# Patient Record
Sex: MALE | Race: WHITE | NOT HISPANIC OR LATINO | URBAN - METROPOLITAN AREA
[De-identification: names, ages, dates, MRNs, and addresses within clinical notes are randomized per-mention and may not be internally consistent; named-entity substitution may affect disease eponyms.]

---

## 2017-04-13 ENCOUNTER — OUTPATIENT (OUTPATIENT)
Dept: OUTPATIENT SERVICES | Facility: HOSPITAL | Age: 77
LOS: 1 days | Discharge: HOME | End: 2017-04-13
Payer: MEDICARE

## 2017-04-13 VITALS
HEART RATE: 68 BPM | TEMPERATURE: 97 F | WEIGHT: 250 LBS | HEIGHT: 70.5 IN | SYSTOLIC BLOOD PRESSURE: 124 MMHG | DIASTOLIC BLOOD PRESSURE: 86 MMHG | RESPIRATION RATE: 20 BRPM | OXYGEN SATURATION: 98 %

## 2017-04-13 DIAGNOSIS — K21.9 GASTRO-ESOPHAGEAL REFLUX DISEASE WITHOUT ESOPHAGITIS: ICD-10-CM

## 2017-04-13 DIAGNOSIS — Z96.653 PRESENCE OF ARTIFICIAL KNEE JOINT, BILATERAL: Chronic | ICD-10-CM

## 2017-04-13 DIAGNOSIS — Z98.61 CORONARY ANGIOPLASTY STATUS: Chronic | ICD-10-CM

## 2017-04-13 DIAGNOSIS — Z90.89 ACQUIRED ABSENCE OF OTHER ORGANS: Chronic | ICD-10-CM

## 2017-04-13 DIAGNOSIS — Z96.643 PRESENCE OF ARTIFICIAL HIP JOINT, BILATERAL: Chronic | ICD-10-CM

## 2017-04-13 LAB
ANION GAP SERPL CALC-SCNC: 8 MMOL/L — SIGNIFICANT CHANGE UP (ref 5–17)
APTT BLD: 33.2 SEC — SIGNIFICANT CHANGE UP (ref 27.5–37.4)
BASOPHILS # BLD AUTO: 0.1 K/UL — SIGNIFICANT CHANGE UP (ref 0–0.2)
BASOPHILS NFR BLD AUTO: 1.4 % — SIGNIFICANT CHANGE UP (ref 0–2)
BUN SERPL-MCNC: 16 MG/DL — SIGNIFICANT CHANGE UP (ref 7–23)
CALCIUM SERPL-MCNC: 9.8 MG/DL — SIGNIFICANT CHANGE UP (ref 8.5–10.1)
CHLORIDE SERPL-SCNC: 106 MMOL/L — SIGNIFICANT CHANGE UP (ref 96–108)
CO2 SERPL-SCNC: 28 MMOL/L — SIGNIFICANT CHANGE UP (ref 22–31)
CREAT SERPL-MCNC: 0.85 MG/DL — SIGNIFICANT CHANGE UP (ref 0.5–1.3)
EOSINOPHIL # BLD AUTO: 1.2 K/UL — HIGH (ref 0–0.5)
EOSINOPHIL NFR BLD AUTO: 11.2 % — HIGH (ref 0–6)
GLUCOSE SERPL-MCNC: 95 MG/DL — SIGNIFICANT CHANGE UP (ref 70–99)
HCT VFR BLD CALC: 50.1 % — HIGH (ref 39–50)
HGB BLD-MCNC: 16.6 G/DL — SIGNIFICANT CHANGE UP (ref 13–17)
INR BLD: 1.17 RATIO — HIGH (ref 0.88–1.16)
LYMPHOCYTES # BLD AUTO: 1.3 K/UL — SIGNIFICANT CHANGE UP (ref 1–3.3)
LYMPHOCYTES # BLD AUTO: 13 % — SIGNIFICANT CHANGE UP (ref 13–44)
MCHC RBC-ENTMCNC: 30.7 PG — SIGNIFICANT CHANGE UP (ref 27–34)
MCHC RBC-ENTMCNC: 33.2 GM/DL — SIGNIFICANT CHANGE UP (ref 32–36)
MCV RBC AUTO: 92.4 FL — SIGNIFICANT CHANGE UP (ref 80–100)
MONOCYTES # BLD AUTO: 0.7 K/UL — SIGNIFICANT CHANGE UP (ref 0–0.9)
MONOCYTES NFR BLD AUTO: 6.4 % — SIGNIFICANT CHANGE UP (ref 2–14)
NEUTROPHILS # BLD AUTO: 7 K/UL — SIGNIFICANT CHANGE UP (ref 1.8–7.4)
NEUTROPHILS NFR BLD AUTO: 68 % — SIGNIFICANT CHANGE UP (ref 43–77)
PLATELET # BLD AUTO: 272 K/UL — SIGNIFICANT CHANGE UP (ref 150–400)
POTASSIUM SERPL-MCNC: 4 MMOL/L — SIGNIFICANT CHANGE UP (ref 3.5–5.3)
POTASSIUM SERPL-SCNC: 4 MMOL/L — SIGNIFICANT CHANGE UP (ref 3.5–5.3)
PROTHROM AB SERPL-ACNC: 12.7 SEC — SIGNIFICANT CHANGE UP (ref 9.8–12.7)
RBC # BLD: 5.42 M/UL — SIGNIFICANT CHANGE UP (ref 4.2–5.8)
RBC # FLD: 12.5 % — SIGNIFICANT CHANGE UP (ref 10.3–14.5)
SODIUM SERPL-SCNC: 142 MMOL/L — SIGNIFICANT CHANGE UP (ref 135–145)
WBC # BLD: 10.3 K/UL — SIGNIFICANT CHANGE UP (ref 3.8–10.5)
WBC # FLD AUTO: 10.3 K/UL — SIGNIFICANT CHANGE UP (ref 3.8–10.5)

## 2017-04-13 PROCEDURE — 93010 ELECTROCARDIOGRAM REPORT: CPT

## 2017-04-13 NOTE — H&P PST ADULT - PSH
H/O bilateral hip replacements  right 1999,  2003 left  revision right THR - 2010  H/o total knee replacement, bilateral  right 1997, left 1999  History of PTCA 1  2013 x 1 stent  S/P tonsillectomy  13 y/o

## 2017-04-13 NOTE — H&P PST ADULT - PMH
AAA (abdominal aortic aneurysm)    CAD (coronary artery disease)    GERD (gastroesophageal reflux disease)    HTN (hypertension)    Hypercholesterolemia    Obesity    Osteoarthritis  hip  Stented coronary artery  PTCA x 1 stent -2013

## 2017-04-13 NOTE — H&P PST ADULT - NSANTHOSAYNRD_GEN_A_CORE
No. ALEXANDRO screening performed.  STOP BANG Legend: 0-2 = LOW Risk; 3-4 = INTERMEDIATE Risk; 5-8 = HIGH Risk

## 2017-04-13 NOTE — H&P PST ADULT - ASSESSMENT
76 y/o male with history of GERD and AAA. Scheduled for EGD with Dr. Sheffield.    Plan  1. Stop all NSAIDS, herbal supplements and vitamins for 7 days.  2. NPO at midnight.  3. Take the following medication (Carvedilol) with small sips of water on the morning of your procedure/surgery.  4. Instructed to follow preop instructions on plavix from Dr. Sheffield.

## 2017-04-19 ENCOUNTER — RESULT REVIEW (OUTPATIENT)
Age: 77
End: 2017-04-19

## 2017-04-20 ENCOUNTER — OUTPATIENT (OUTPATIENT)
Dept: OUTPATIENT SERVICES | Facility: HOSPITAL | Age: 77
LOS: 1 days | Discharge: HOME | End: 2017-04-20
Payer: MEDICARE

## 2017-04-20 VITALS
TEMPERATURE: 97 F | HEART RATE: 67 BPM | HEIGHT: 70 IN | DIASTOLIC BLOOD PRESSURE: 80 MMHG | SYSTOLIC BLOOD PRESSURE: 140 MMHG | WEIGHT: 250 LBS | OXYGEN SATURATION: 97 % | RESPIRATION RATE: 16 BRPM

## 2017-04-20 DIAGNOSIS — Z98.61 CORONARY ANGIOPLASTY STATUS: Chronic | ICD-10-CM

## 2017-04-20 DIAGNOSIS — Z96.643 PRESENCE OF ARTIFICIAL HIP JOINT, BILATERAL: Chronic | ICD-10-CM

## 2017-04-20 DIAGNOSIS — Z90.89 ACQUIRED ABSENCE OF OTHER ORGANS: Chronic | ICD-10-CM

## 2017-04-20 DIAGNOSIS — Z96.653 PRESENCE OF ARTIFICIAL KNEE JOINT, BILATERAL: Chronic | ICD-10-CM

## 2017-04-20 PROCEDURE — 88305 TISSUE EXAM BY PATHOLOGIST: CPT | Mod: 26

## 2017-04-20 PROCEDURE — 88313 SPECIAL STAINS GROUP 2: CPT | Mod: 26

## 2017-04-20 RX ORDER — SODIUM CHLORIDE 9 MG/ML
1000 INJECTION INTRAMUSCULAR; INTRAVENOUS; SUBCUTANEOUS
Qty: 0 | Refills: 0 | Status: DISCONTINUED | OUTPATIENT
Start: 2017-04-20 | End: 2017-05-05

## 2017-04-24 LAB — SURGICAL PATHOLOGY FINAL REPORT - CH: SIGNIFICANT CHANGE UP

## 2017-04-26 DIAGNOSIS — K44.9 DIAPHRAGMATIC HERNIA WITHOUT OBSTRUCTION OR GANGRENE: ICD-10-CM

## 2017-04-26 DIAGNOSIS — K22.70 BARRETT'S ESOPHAGUS WITHOUT DYSPLASIA: ICD-10-CM

## 2017-04-26 DIAGNOSIS — K21.0 GASTRO-ESOPHAGEAL REFLUX DISEASE WITH ESOPHAGITIS: ICD-10-CM

## 2017-04-26 DIAGNOSIS — Z79.02 LONG TERM (CURRENT) USE OF ANTITHROMBOTICS/ANTIPLATELETS: ICD-10-CM

## 2017-04-26 DIAGNOSIS — Z91.040 LATEX ALLERGY STATUS: ICD-10-CM

## 2017-04-26 DIAGNOSIS — Z96.651 PRESENCE OF RIGHT ARTIFICIAL KNEE JOINT: ICD-10-CM

## 2017-04-26 DIAGNOSIS — Z87.891 PERSONAL HISTORY OF NICOTINE DEPENDENCE: ICD-10-CM

## 2017-04-26 DIAGNOSIS — I10 ESSENTIAL (PRIMARY) HYPERTENSION: ICD-10-CM

## 2017-04-26 DIAGNOSIS — Z88.5 ALLERGY STATUS TO NARCOTIC AGENT: ICD-10-CM

## 2017-04-26 DIAGNOSIS — Z95.5 PRESENCE OF CORONARY ANGIOPLASTY IMPLANT AND GRAFT: ICD-10-CM

## 2017-04-26 DIAGNOSIS — I25.10 ATHEROSCLEROTIC HEART DISEASE OF NATIVE CORONARY ARTERY WITHOUT ANGINA PECTORIS: ICD-10-CM

## 2017-04-26 DIAGNOSIS — Z86.718 PERSONAL HISTORY OF OTHER VENOUS THROMBOSIS AND EMBOLISM: ICD-10-CM

## 2017-04-26 DIAGNOSIS — Z96.643 PRESENCE OF ARTIFICIAL HIP JOINT, BILATERAL: ICD-10-CM

## 2018-06-04 ENCOUNTER — TRANSCRIPTION ENCOUNTER (OUTPATIENT)
Age: 78
End: 2018-06-04

## 2018-06-04 NOTE — ASU PATIENT PROFILE, ADULT - VISION (WITH CORRECTIVE LENSES IF THE PATIENT USUALLY WEARS THEM):
Partially impaired: cannot see medication labels or newsprint, but can see obstacles in path, and the surrounding layout; can count fingers at arm's length/left eye blurry

## 2018-06-04 NOTE — ASU PATIENT PROFILE, ADULT - PSH
H/O bilateral hip replacements  right 1999,  2003 left  revision right THR - 2010  H/o total knee replacement, bilateral  right 1997, left 1999  History of PTCA 1  2013 x 1 stent  S/P tonsillectomy  11 y/o

## 2018-06-05 ENCOUNTER — OUTPATIENT (OUTPATIENT)
Dept: OUTPATIENT SERVICES | Facility: HOSPITAL | Age: 78
LOS: 1 days | End: 2018-06-05
Payer: MEDICARE

## 2018-06-05 VITALS
HEART RATE: 63 BPM | OXYGEN SATURATION: 98 % | DIASTOLIC BLOOD PRESSURE: 70 MMHG | SYSTOLIC BLOOD PRESSURE: 125 MMHG | RESPIRATION RATE: 16 BRPM

## 2018-06-05 VITALS
HEART RATE: 64 BPM | DIASTOLIC BLOOD PRESSURE: 72 MMHG | WEIGHT: 260.59 LBS | RESPIRATION RATE: 20 BRPM | SYSTOLIC BLOOD PRESSURE: 140 MMHG | HEIGHT: 69 IN | TEMPERATURE: 98 F | OXYGEN SATURATION: 98 %

## 2018-06-05 DIAGNOSIS — H35.342 MACULAR CYST, HOLE, OR PSEUDOHOLE, LEFT EYE: ICD-10-CM

## 2018-06-05 DIAGNOSIS — Z98.61 CORONARY ANGIOPLASTY STATUS: Chronic | ICD-10-CM

## 2018-06-05 DIAGNOSIS — Z90.89 ACQUIRED ABSENCE OF OTHER ORGANS: Chronic | ICD-10-CM

## 2018-06-05 DIAGNOSIS — Z96.653 PRESENCE OF ARTIFICIAL KNEE JOINT, BILATERAL: Chronic | ICD-10-CM

## 2018-06-05 DIAGNOSIS — Z96.643 PRESENCE OF ARTIFICIAL HIP JOINT, BILATERAL: Chronic | ICD-10-CM

## 2018-06-05 PROCEDURE — C1889: CPT

## 2018-06-05 PROCEDURE — 67042 VIT FOR MACULAR HOLE: CPT | Mod: LT

## 2018-06-05 NOTE — ASU DISCHARGE PLAN (ADULT/PEDIATRIC). - NOTIFY
Bleeding that does not stop/Fever greater than 101/Pain not relieved by Medications/Persistent Nausea and Vomiting/Inability to Tolerate Liquids or Foods Bleeding that does not stop/Swelling that continues/Fever greater than 101/Persistent Nausea and Vomiting/Pain not relieved by Medications

## 2018-06-19 ENCOUNTER — TRANSCRIPTION ENCOUNTER (OUTPATIENT)
Age: 78
End: 2018-06-19

## 2018-08-23 NOTE — ASU PREOP CHECKLIST - NS PREOP CHK MONITOR ANESTHESIA CONSENT
Copied To:  Faye Call MD

Attending MD:  Faye Call MD



PROCEDURE DATE:  08/22/2018



PREOPERATIVE DIAGNOSIS:  Cholelithiasis.



POSTOPERATIVE DIAGNOSIS:  Cholelithiasis.



PROCEDURE:  Laparoscopic cholecystectomy.



SURGEON:  Faye Call MD.



ASSISTANT:  Pelon Smith DO.



ANESTHESIA:  General.



ANESTHESIA ADMINISTERED BY:  Dr. Solitario



DESCRIPTION OF OPERATION:  With the patient in the supine position under

adequate general anesthesia, the abdomen was prepped and draped in the

usual sterile manner.  Veress needle puncture was performed at the

umbilicus with insufflation to 15 cm water pressure of CO2 and a 10 mm

laparoscopic trocar was inserted via an infraumbilical incision.  Under

direct vision, additional trocars were inserted in the epigastrium and

right costal margin.  The gallbladder was visualized.  It was not acutely

inflamed although there were adhesions to the peritoneal surface of the

gallbladder consistent with recent inflammation.  The gallbladder fundus

was grasped and elevated.  A number of lobulated stones were noted within

the gallbladder including in the area of the infundibulum.  The

infundibular stones were elevated out of the infundibulum, and the

infundibulum was grasped and retracted laterally.  The infundibulum was

noted to be a elongated and tortuous, and the cystic duct was carefully

dissected and identified freeing up areas _____ itself and the gallbladder

was then cleared circumferential.  The cystic duct was then followed

proximally into the gallbladder and distally down towards the common bile

duct.  The cystic artery was also identified, and the anterior branch of

the cystic artery was triply clipped and divided to allow full

identification of the cystic duct.  A cystic duct was then completely

dissected and triply clipped and divided, and the gallbladder was dissected

free of the liver bed using electrocautery.  The posterior branch of the

cystic artery was also identified within the peritoneal fold and this also

was triply clipped and divided.  The liver bed was inspected for hemostasis

and the dissection was completed.  The gallbladder was placed in a specimen

retrieval bag and removed via the umbilical port site.  The

pneumoperitoneum was released and the trocars removed.  The umbilical port

site was closed with a figure-of-eight fascial suture of 0 Vicryl.  All

incisions were closed with 4-0 Monocryl subcuticular sutures and

Steri-Strips.  Dry sterile dressings were applied.  The patient tolerated

the procedure well and transferred to the recovery room in stable

condition.  Estimated blood loss for the procedure was 10 mL.





__________________________________________

Faye aCll MD



DD:  08/22/2018 17:11:35

DT:  08/22/2018 22:24:22

Job # [id number]
done

## 2019-04-08 PROBLEM — M19.90 UNSPECIFIED OSTEOARTHRITIS, UNSPECIFIED SITE: Chronic | Status: ACTIVE | Noted: 2017-04-13

## 2019-04-08 PROBLEM — K21.9 GASTRO-ESOPHAGEAL REFLUX DISEASE WITHOUT ESOPHAGITIS: Chronic | Status: ACTIVE | Noted: 2017-04-13

## 2019-04-08 PROBLEM — I10 ESSENTIAL (PRIMARY) HYPERTENSION: Chronic | Status: ACTIVE | Noted: 2017-04-13

## 2019-04-08 PROBLEM — E78.00 PURE HYPERCHOLESTEROLEMIA, UNSPECIFIED: Chronic | Status: ACTIVE | Noted: 2017-04-13

## 2019-04-08 PROBLEM — E66.9 OBESITY, UNSPECIFIED: Chronic | Status: ACTIVE | Noted: 2017-04-13

## 2019-04-08 PROBLEM — Z95.5 PRESENCE OF CORONARY ANGIOPLASTY IMPLANT AND GRAFT: Chronic | Status: ACTIVE | Noted: 2017-04-13

## 2019-04-08 PROBLEM — I25.10 ATHEROSCLEROTIC HEART DISEASE OF NATIVE CORONARY ARTERY WITHOUT ANGINA PECTORIS: Chronic | Status: ACTIVE | Noted: 2017-04-13

## 2019-04-16 ENCOUNTER — RECORD ABSTRACTING (OUTPATIENT)
Age: 79
End: 2019-04-16

## 2019-04-16 DIAGNOSIS — Z78.9 OTHER SPECIFIED HEALTH STATUS: ICD-10-CM

## 2019-04-16 DIAGNOSIS — Z87.891 PERSONAL HISTORY OF NICOTINE DEPENDENCE: ICD-10-CM

## 2019-04-16 DIAGNOSIS — Z98.890 OTHER SPECIFIED POSTPROCEDURAL STATES: ICD-10-CM

## 2019-04-16 DIAGNOSIS — Z63.4 DISAPPEARANCE AND DEATH OF FAMILY MEMBER: ICD-10-CM

## 2019-04-16 DIAGNOSIS — K63.5 POLYP OF COLON: ICD-10-CM

## 2019-04-16 DIAGNOSIS — K57.90 DIVERTICULOSIS OF INTESTINE, PART UNSPECIFIED, W/OUT PERFORATION OR ABSCESS W/OUT BLEEDING: ICD-10-CM

## 2019-04-16 DIAGNOSIS — M19.90 UNSPECIFIED OSTEOARTHRITIS, UNSPECIFIED SITE: ICD-10-CM

## 2019-04-16 DIAGNOSIS — Z86.018 PERSONAL HISTORY OF OTHER BENIGN NEOPLASM: ICD-10-CM

## 2019-04-16 DIAGNOSIS — Z86.010 PERSONAL HISTORY OF COLONIC POLYPS: ICD-10-CM

## 2019-04-16 SDOH — SOCIAL STABILITY - SOCIAL INSECURITY: DISSAPEARANCE AND DEATH OF FAMILY MEMBER: Z63.4

## 2019-04-25 ENCOUNTER — OUTPATIENT (OUTPATIENT)
Dept: OUTPATIENT SERVICES | Facility: HOSPITAL | Age: 79
LOS: 1 days | Discharge: HOME | End: 2019-04-25
Payer: MEDICARE

## 2019-04-25 VITALS
SYSTOLIC BLOOD PRESSURE: 153 MMHG | RESPIRATION RATE: 20 BRPM | WEIGHT: 257.94 LBS | OXYGEN SATURATION: 97 % | HEART RATE: 52 BPM | DIASTOLIC BLOOD PRESSURE: 73 MMHG | HEIGHT: 70 IN | TEMPERATURE: 98 F

## 2019-04-25 DIAGNOSIS — K51.40 INFLAMMATORY POLYPS OF COLON WITHOUT COMPLICATIONS: ICD-10-CM

## 2019-04-25 DIAGNOSIS — Z86.010 PERSONAL HISTORY OF COLONIC POLYPS: ICD-10-CM

## 2019-04-25 DIAGNOSIS — Z98.890 OTHER SPECIFIED POSTPROCEDURAL STATES: Chronic | ICD-10-CM

## 2019-04-25 DIAGNOSIS — Z90.89 ACQUIRED ABSENCE OF OTHER ORGANS: Chronic | ICD-10-CM

## 2019-04-25 DIAGNOSIS — Z96.643 PRESENCE OF ARTIFICIAL HIP JOINT, BILATERAL: Chronic | ICD-10-CM

## 2019-04-25 DIAGNOSIS — Z98.49 CATARACT EXTRACTION STATUS, UNSPECIFIED EYE: Chronic | ICD-10-CM

## 2019-04-25 DIAGNOSIS — Z11.3 ENCOUNTER FOR SCREENING FOR INFECTIONS WITH A PREDOMINANTLY SEXUAL MODE OF TRANSMISSION: ICD-10-CM

## 2019-04-25 DIAGNOSIS — Z96.653 PRESENCE OF ARTIFICIAL KNEE JOINT, BILATERAL: Chronic | ICD-10-CM

## 2019-04-25 DIAGNOSIS — Z98.61 CORONARY ANGIOPLASTY STATUS: Chronic | ICD-10-CM

## 2019-04-25 LAB
ANION GAP SERPL CALC-SCNC: 3 MMOL/L — LOW (ref 5–17)
APTT BLD: 31.6 SEC — SIGNIFICANT CHANGE UP (ref 27.5–36.3)
BASOPHILS # BLD AUTO: 0.22 K/UL — HIGH (ref 0–0.2)
BASOPHILS NFR BLD AUTO: 2 % — SIGNIFICANT CHANGE UP (ref 0–2)
BUN SERPL-MCNC: 13 MG/DL — SIGNIFICANT CHANGE UP (ref 7–23)
CALCIUM SERPL-MCNC: 9.2 MG/DL — SIGNIFICANT CHANGE UP (ref 8.5–10.1)
CHLORIDE SERPL-SCNC: 110 MMOL/L — HIGH (ref 96–108)
CO2 SERPL-SCNC: 31 MMOL/L — SIGNIFICANT CHANGE UP (ref 22–31)
CREAT SERPL-MCNC: 0.96 MG/DL — SIGNIFICANT CHANGE UP (ref 0.5–1.3)
EOSINOPHIL # BLD AUTO: 3.18 K/UL — HIGH (ref 0–0.5)
EOSINOPHIL NFR BLD AUTO: 29 % — HIGH (ref 0–6)
GLUCOSE SERPL-MCNC: 107 MG/DL — HIGH (ref 70–99)
HCT VFR BLD CALC: 50.5 % — HIGH (ref 39–50)
HGB BLD-MCNC: 16.6 G/DL — SIGNIFICANT CHANGE UP (ref 13–17)
INR BLD: 1.13 RATIO — SIGNIFICANT CHANGE UP (ref 0.88–1.16)
LYMPHOCYTES # BLD AUTO: 1.42 K/UL — SIGNIFICANT CHANGE UP (ref 1–3.3)
LYMPHOCYTES # BLD AUTO: 13 % — SIGNIFICANT CHANGE UP (ref 13–44)
MANUAL SMEAR VERIFICATION: SIGNIFICANT CHANGE UP
MCHC RBC-ENTMCNC: 31.9 PG — SIGNIFICANT CHANGE UP (ref 27–34)
MCHC RBC-ENTMCNC: 32.9 GM/DL — SIGNIFICANT CHANGE UP (ref 32–36)
MCV RBC AUTO: 97.1 FL — SIGNIFICANT CHANGE UP (ref 80–100)
MONOCYTES # BLD AUTO: 0.88 K/UL — SIGNIFICANT CHANGE UP (ref 0–0.9)
MONOCYTES NFR BLD AUTO: 8 % — SIGNIFICANT CHANGE UP (ref 2–14)
NEUTROPHILS # BLD AUTO: 5.04 K/UL — SIGNIFICANT CHANGE UP (ref 1.8–7.4)
NEUTROPHILS NFR BLD AUTO: 46 % — SIGNIFICANT CHANGE UP (ref 43–77)
NRBC # BLD: 0 /100 — SIGNIFICANT CHANGE UP (ref 0–0)
NRBC # BLD: SIGNIFICANT CHANGE UP /100 WBCS (ref 0–0)
PLAT MORPH BLD: NORMAL — SIGNIFICANT CHANGE UP
PLATELET # BLD AUTO: 216 K/UL — SIGNIFICANT CHANGE UP (ref 150–400)
POTASSIUM SERPL-MCNC: 4.6 MMOL/L — SIGNIFICANT CHANGE UP (ref 3.5–5.3)
POTASSIUM SERPL-SCNC: 4.6 MMOL/L — SIGNIFICANT CHANGE UP (ref 3.5–5.3)
PROTHROM AB SERPL-ACNC: 12.6 SEC — SIGNIFICANT CHANGE UP (ref 10–12.9)
RBC # BLD: 5.2 M/UL — SIGNIFICANT CHANGE UP (ref 4.2–5.8)
RBC # FLD: 13.2 % — SIGNIFICANT CHANGE UP (ref 10.3–14.5)
RBC BLD AUTO: NORMAL — SIGNIFICANT CHANGE UP
SODIUM SERPL-SCNC: 144 MMOL/L — SIGNIFICANT CHANGE UP (ref 135–145)
VARIANT LYMPHS # BLD: 2 % — SIGNIFICANT CHANGE UP (ref 0–6)
WBC # BLD: 10.96 K/UL — HIGH (ref 3.8–10.5)
WBC # FLD AUTO: 10.96 K/UL — HIGH (ref 3.8–10.5)

## 2019-04-25 PROCEDURE — 93010 ELECTROCARDIOGRAM REPORT: CPT

## 2019-04-25 NOTE — H&P PST ADULT - NSICDXPASTSURGICALHX_GEN_ALL_CORE_FT
PAST SURGICAL HISTORY:  H/O bilateral hip replacements right 1999,  2003 left  revision right THR - 2010 and 2013    H/O colonoscopy 2014    H/O endoscopy 2017    H/o total knee replacement, bilateral right 1997, left 1999    History of PTCA 1 2013 x 1 stent    S/P cataract surgery b/l    S/P tonsillectomy 11 y/o

## 2019-04-25 NOTE — H&P PST ADULT - NSICDXPASTMEDICALHX_GEN_ALL_CORE_FT
PAST MEDICAL HISTORY:  AF (atrial fibrillation)     Aortic aneurysm, thoracic     CAD (coronary artery disease)     GERD (gastroesophageal reflux disease)     HTN (hypertension)     Hypercholesterolemia     Obesity     Osteoarthritis hip    Stented coronary artery PTCA x 1 stent -2013

## 2019-04-25 NOTE — H&P PST ADULT - NSICDXFAMILYHX_GEN_ALL_CORE_FT
FAMILY HISTORY:  Mother  Still living? No  Family history of myocardial infarction, Age at diagnosis: Age Unknown

## 2019-04-25 NOTE — H&P PST ADULT - ASSESSMENT
78 y/o male scheduled for screening colonoscopy. Pt reports history of benign colon polyps, CAD with 1 stent, HTN. Denies blood in stool, no changes in bowel habits.   Plan:  1. NPO post midnight  2. Follow bowel prep instructions from Dr. Sheffield  3. Follow preop medication (including Plavix and Aspirin) instructions from Dr. Sheffield  4. Labs, EKG as per Dr. Sheffield

## 2019-04-25 NOTE — H&P PST ADULT - HISTORY OF PRESENT ILLNESS
80 y/o male scheduled for screening colonoscopy. Pt reports history of benign colon polyps, CAD with 1 stent, HTN. Denies blood in stool, no changes in bowel habits. Here today for PST prior to procedure.

## 2019-04-26 PROBLEM — I71.4 ABDOMINAL AORTIC ANEURYSM, WITHOUT RUPTURE: Chronic | Status: INACTIVE | Noted: 2017-04-13 | Resolved: 2019-04-25

## 2019-05-01 ENCOUNTER — OUTPATIENT (OUTPATIENT)
Dept: OUTPATIENT SERVICES | Facility: HOSPITAL | Age: 79
LOS: 1 days | Discharge: HOME | End: 2019-05-01
Payer: MEDICARE

## 2019-05-01 ENCOUNTER — RESULT REVIEW (OUTPATIENT)
Age: 79
End: 2019-05-01

## 2019-05-01 ENCOUNTER — APPOINTMENT (OUTPATIENT)
Dept: GASTROENTEROLOGY | Facility: HOSPITAL | Age: 79
End: 2019-05-01
Payer: MEDICARE

## 2019-05-01 VITALS
RESPIRATION RATE: 26 BRPM | HEART RATE: 78 BPM | OXYGEN SATURATION: 95 % | HEIGHT: 70 IN | DIASTOLIC BLOOD PRESSURE: 89 MMHG | TEMPERATURE: 97 F | WEIGHT: 257.94 LBS | SYSTOLIC BLOOD PRESSURE: 150 MMHG

## 2019-05-01 VITALS — HEIGHT: 70 IN | WEIGHT: 257.94 LBS

## 2019-05-01 DIAGNOSIS — Z11.3 ENCOUNTER FOR SCREENING FOR INFECTIONS WITH A PREDOMINANTLY SEXUAL MODE OF TRANSMISSION: ICD-10-CM

## 2019-05-01 DIAGNOSIS — Z90.89 ACQUIRED ABSENCE OF OTHER ORGANS: Chronic | ICD-10-CM

## 2019-05-01 DIAGNOSIS — Z98.49 CATARACT EXTRACTION STATUS, UNSPECIFIED EYE: Chronic | ICD-10-CM

## 2019-05-01 DIAGNOSIS — Z01.818 ENCOUNTER FOR OTHER PREPROCEDURAL EXAMINATION: ICD-10-CM

## 2019-05-01 DIAGNOSIS — Z86.010 PERSONAL HISTORY OF COLONIC POLYPS: ICD-10-CM

## 2019-05-01 DIAGNOSIS — Z98.890 OTHER SPECIFIED POSTPROCEDURAL STATES: Chronic | ICD-10-CM

## 2019-05-01 DIAGNOSIS — Z98.61 CORONARY ANGIOPLASTY STATUS: Chronic | ICD-10-CM

## 2019-05-01 DIAGNOSIS — Z96.643 PRESENCE OF ARTIFICIAL HIP JOINT, BILATERAL: Chronic | ICD-10-CM

## 2019-05-01 DIAGNOSIS — Z96.653 PRESENCE OF ARTIFICIAL KNEE JOINT, BILATERAL: Chronic | ICD-10-CM

## 2019-05-01 DIAGNOSIS — K51.40 INFLAMMATORY POLYPS OF COLON WITHOUT COMPLICATIONS: ICD-10-CM

## 2019-05-01 PROCEDURE — 45380 COLONOSCOPY AND BIOPSY: CPT

## 2019-05-01 PROCEDURE — 88305 TISSUE EXAM BY PATHOLOGIST: CPT | Mod: 26

## 2019-05-01 RX ORDER — SODIUM CHLORIDE 9 MG/ML
1000 INJECTION INTRAMUSCULAR; INTRAVENOUS; SUBCUTANEOUS
Qty: 0 | Refills: 0 | Status: DISCONTINUED | OUTPATIENT
Start: 2019-05-01 | End: 2019-05-16

## 2019-05-01 NOTE — ASU PATIENT PROFILE, ADULT - PMH
AF (atrial fibrillation)    Aortic aneurysm, thoracic    CAD (coronary artery disease)    GERD (gastroesophageal reflux disease)    HTN (hypertension)    Hypercholesterolemia    Obesity    Osteoarthritis  hip  Stented coronary artery  PTCA x 1 stent -2013

## 2019-05-01 NOTE — ASU PATIENT PROFILE, ADULT - PSH
H/O bilateral hip replacements  right 1999,  2003 left  revision right THR - 2010 and 2013  H/O colonoscopy  2014  H/O endoscopy  2017  H/o total knee replacement, bilateral  right 1997, left 1999  History of PTCA 1  2013 x 1 stent  S/P cataract surgery  b/l  S/P tonsillectomy  11 y/o

## 2019-05-01 NOTE — ASU PATIENT PROFILE, ADULT - FALL HARM RISK CONCLUSION
I have reviewed discharge information with patient. Patient verbalizes understanding. Patient ambulatory out of ER with steady gait. No distress noted.
Universal Safety Interventions

## 2019-05-02 LAB — SURGICAL PATHOLOGY STUDY: SIGNIFICANT CHANGE UP

## 2019-05-06 DIAGNOSIS — Z79.02 LONG TERM (CURRENT) USE OF ANTITHROMBOTICS/ANTIPLATELETS: ICD-10-CM

## 2019-05-06 DIAGNOSIS — I25.10 ATHEROSCLEROTIC HEART DISEASE OF NATIVE CORONARY ARTERY WITHOUT ANGINA PECTORIS: ICD-10-CM

## 2019-05-06 DIAGNOSIS — E78.00 PURE HYPERCHOLESTEROLEMIA, UNSPECIFIED: ICD-10-CM

## 2019-05-06 DIAGNOSIS — E66.01 MORBID (SEVERE) OBESITY DUE TO EXCESS CALORIES: ICD-10-CM

## 2019-05-06 DIAGNOSIS — I10 ESSENTIAL (PRIMARY) HYPERTENSION: ICD-10-CM

## 2019-05-06 DIAGNOSIS — K64.9 UNSPECIFIED HEMORRHOIDS: ICD-10-CM

## 2019-05-06 DIAGNOSIS — K57.30 DIVERTICULOSIS OF LARGE INTESTINE WITHOUT PERFORATION OR ABSCESS WITHOUT BLEEDING: ICD-10-CM

## 2019-05-06 DIAGNOSIS — Z96.643 PRESENCE OF ARTIFICIAL HIP JOINT, BILATERAL: ICD-10-CM

## 2019-05-06 DIAGNOSIS — Z95.5 PRESENCE OF CORONARY ANGIOPLASTY IMPLANT AND GRAFT: ICD-10-CM

## 2019-05-06 DIAGNOSIS — Z88.5 ALLERGY STATUS TO NARCOTIC AGENT: ICD-10-CM

## 2019-05-06 DIAGNOSIS — Z96.653 PRESENCE OF ARTIFICIAL KNEE JOINT, BILATERAL: ICD-10-CM

## 2019-05-06 DIAGNOSIS — Z12.11 ENCOUNTER FOR SCREENING FOR MALIGNANT NEOPLASM OF COLON: ICD-10-CM

## 2019-05-06 DIAGNOSIS — K21.9 GASTRO-ESOPHAGEAL REFLUX DISEASE WITHOUT ESOPHAGITIS: ICD-10-CM

## 2019-05-06 DIAGNOSIS — D12.3 BENIGN NEOPLASM OF TRANSVERSE COLON: ICD-10-CM

## 2019-05-06 DIAGNOSIS — I48.91 UNSPECIFIED ATRIAL FIBRILLATION: ICD-10-CM

## 2019-05-06 DIAGNOSIS — Z91.040 LATEX ALLERGY STATUS: ICD-10-CM

## 2019-05-06 DIAGNOSIS — Z86.010 PERSONAL HISTORY OF COLONIC POLYPS: ICD-10-CM

## 2019-05-06 DIAGNOSIS — Z87.891 PERSONAL HISTORY OF NICOTINE DEPENDENCE: ICD-10-CM

## 2019-05-06 DIAGNOSIS — Z79.82 LONG TERM (CURRENT) USE OF ASPIRIN: ICD-10-CM

## 2019-05-06 DIAGNOSIS — Z91.048 OTHER NONMEDICINAL SUBSTANCE ALLERGY STATUS: ICD-10-CM

## 2019-09-26 NOTE — ASU DISCHARGE PLAN (ADULT/PEDIATRIC). - COMMENTS
Statement Selected do not rub eye. tylenol for discomfort.  avoid advil motrin aleve aspirin to decrease chance of bleeding. eye kit given to pt.  Discharge and follow up  instructions explained to pt.  pt understands proper use of eye drops and instructions.

## 2020-09-25 NOTE — H&P PST ADULT - GUM GEN PE MLT EXAM PC
NO SEDATION      1418 Pt arrived to x ray room 6via wheelchair. U/S images obtained. Pt offered reassurance and VSS. Pt denies pain at this time. Consent signed. .     1419 Timeout completed. 1420 Using U/S, Dr. Darrion Atwood marked left lower abdomen and area cleansed with large tinted chloraprep. Once dry, using sterile technique, Dr. Darrion Atwood prepped and draped area. 1424 Using U/S guidance, Dr. Mel Workmanin site with 2% lidocaine, see eMar.     1429 Using U/S guidance, access obtained with [id number] centesis 5F catheter with return of cloudy aaron colored fluid. Catheter tubing connected to Maria Guadalupe machine with suction at 200 mmHG and draining well. Pt tolerating well. VSS. 1500 Drainage complete. Total 7000ml removed. Centesis catheter removed and digital pressure held to site for 2 minutes. 1504 Left abdominal site  soft, no drainage, large bandaid applied. not examined

## 2022-02-23 NOTE — ASU DISCHARGE PLAN (ADULT/PEDIATRIC). - PATIENT BELONGING
patient's belongings returned GENERAL: AAOx4, GCS 15, NAD, WDWN;   HEENT: MMM, EOMI, nonicteric sclera;   PULM: CTAB, no crackles/rubs/rales;   CV: RRR, S1S2, no MRG;   ABD: Flat abdomen, NTND, no R/G/R.   MSK: HARRISON, +2 pulses x4;  5/5 strength in UE and LE bilaterally  NEURO: No obvious focal deficits; Sensation intact to gross touch in UE and LE; CN II-XII intact  PSYCH: AAOx3, clear thought and normal sensorium.

## 2023-04-25 NOTE — ASU PATIENT PROFILE, ADULT - PSH
No H/O bilateral hip replacements  right 1999,  2003 left  revision right THR - 2010 and 2013  H/O colonoscopy  2014  H/O endoscopy  2017  H/o total knee replacement, bilateral  right 1997, left 1999  History of PTCA 1  2013 x 1 stent  S/P cataract surgery  b/l  S/P tonsillectomy  13 y/o

## 2023-05-26 NOTE — H&P PST ADULT - NSANTHBPHIGHRD_ENT_A_CORE
Jeremiah from [facility] called the clinic to advise writer that patient's supplies will not be coming in until next week Tuesday. Orders were placed with alternative dressings that [facility] has supplied. Orders were faxed to [facility] at [phone].  
Yes

## 2023-09-18 ENCOUNTER — EMERGENCY (EMERGENCY)
Facility: HOSPITAL | Age: 83
LOS: 0 days | Discharge: HOME | End: 2023-09-18
Attending: EMERGENCY MEDICINE
Payer: MEDICARE

## 2023-09-18 VITALS
OXYGEN SATURATION: 94 % | RESPIRATION RATE: 18 BRPM | SYSTOLIC BLOOD PRESSURE: 125 MMHG | DIASTOLIC BLOOD PRESSURE: 65 MMHG | HEART RATE: 73 BPM

## 2023-09-18 VITALS — WEIGHT: 255.07 LBS | HEIGHT: 70 IN

## 2023-09-18 DIAGNOSIS — R07.89 OTHER CHEST PAIN: ICD-10-CM

## 2023-09-18 DIAGNOSIS — Z95.5 PRESENCE OF CORONARY ANGIOPLASTY IMPLANT AND GRAFT: ICD-10-CM

## 2023-09-18 DIAGNOSIS — I44.7 LEFT BUNDLE-BRANCH BLOCK, UNSPECIFIED: ICD-10-CM

## 2023-09-18 DIAGNOSIS — Z98.49 CATARACT EXTRACTION STATUS, UNSPECIFIED EYE: Chronic | ICD-10-CM

## 2023-09-18 DIAGNOSIS — I48.91 UNSPECIFIED ATRIAL FIBRILLATION: ICD-10-CM

## 2023-09-18 DIAGNOSIS — E78.00 PURE HYPERCHOLESTEROLEMIA, UNSPECIFIED: ICD-10-CM

## 2023-09-18 DIAGNOSIS — Z96.653 PRESENCE OF ARTIFICIAL KNEE JOINT, BILATERAL: Chronic | ICD-10-CM

## 2023-09-18 DIAGNOSIS — I10 ESSENTIAL (PRIMARY) HYPERTENSION: ICD-10-CM

## 2023-09-18 DIAGNOSIS — Z96.653 PRESENCE OF ARTIFICIAL KNEE JOINT, BILATERAL: ICD-10-CM

## 2023-09-18 DIAGNOSIS — Z98.61 CORONARY ANGIOPLASTY STATUS: Chronic | ICD-10-CM

## 2023-09-18 DIAGNOSIS — Z87.891 PERSONAL HISTORY OF NICOTINE DEPENDENCE: ICD-10-CM

## 2023-09-18 DIAGNOSIS — Z88.8 ALLERGY STATUS TO OTHER DRUGS, MEDICAMENTS AND BIOLOGICAL SUBSTANCES: ICD-10-CM

## 2023-09-18 DIAGNOSIS — Z79.82 LONG TERM (CURRENT) USE OF ASPIRIN: ICD-10-CM

## 2023-09-18 DIAGNOSIS — Z98.890 OTHER SPECIFIED POSTPROCEDURAL STATES: Chronic | ICD-10-CM

## 2023-09-18 DIAGNOSIS — Z96.643 PRESENCE OF ARTIFICIAL HIP JOINT, BILATERAL: Chronic | ICD-10-CM

## 2023-09-18 DIAGNOSIS — Z79.02 LONG TERM (CURRENT) USE OF ANTITHROMBOTICS/ANTIPLATELETS: ICD-10-CM

## 2023-09-18 DIAGNOSIS — I25.10 ATHEROSCLEROTIC HEART DISEASE OF NATIVE CORONARY ARTERY WITHOUT ANGINA PECTORIS: ICD-10-CM

## 2023-09-18 DIAGNOSIS — Z86.39 PERSONAL HISTORY OF OTHER ENDOCRINE, NUTRITIONAL AND METABOLIC DISEASE: ICD-10-CM

## 2023-09-18 DIAGNOSIS — Z90.89 ACQUIRED ABSENCE OF OTHER ORGANS: Chronic | ICD-10-CM

## 2023-09-18 DIAGNOSIS — Z91.040 LATEX ALLERGY STATUS: ICD-10-CM

## 2023-09-18 DIAGNOSIS — Z88.5 ALLERGY STATUS TO NARCOTIC AGENT: ICD-10-CM

## 2023-09-18 DIAGNOSIS — Z96.643 PRESENCE OF ARTIFICIAL HIP JOINT, BILATERAL: ICD-10-CM

## 2023-09-18 PROBLEM — I71.2 THORACIC AORTIC ANEURYSM, WITHOUT RUPTURE: Chronic | Status: ACTIVE | Noted: 2019-04-25

## 2023-09-18 LAB
ADD ON TEST-SPECIMEN IN LAB: SIGNIFICANT CHANGE UP
ALBUMIN SERPL ELPH-MCNC: 3.5 G/DL — SIGNIFICANT CHANGE UP (ref 3.3–5)
ALP SERPL-CCNC: 54 U/L — SIGNIFICANT CHANGE UP (ref 40–120)
ALT FLD-CCNC: 22 U/L — SIGNIFICANT CHANGE UP (ref 12–78)
ANION GAP SERPL CALC-SCNC: 3 MMOL/L — LOW (ref 5–17)
APTT BLD: 23.7 SEC — LOW (ref 24.5–35.6)
AST SERPL-CCNC: 34 U/L — SIGNIFICANT CHANGE UP (ref 15–37)
BASOPHILS # BLD AUTO: 0.09 K/UL — SIGNIFICANT CHANGE UP (ref 0–0.2)
BASOPHILS NFR BLD AUTO: 0.7 % — SIGNIFICANT CHANGE UP (ref 0–2)
BILIRUB SERPL-MCNC: 0.5 MG/DL — SIGNIFICANT CHANGE UP (ref 0.2–1.2)
BUN SERPL-MCNC: 17 MG/DL — SIGNIFICANT CHANGE UP (ref 7–23)
CALCIUM SERPL-MCNC: 10.1 MG/DL — SIGNIFICANT CHANGE UP (ref 8.5–10.1)
CHLORIDE SERPL-SCNC: 109 MMOL/L — HIGH (ref 96–108)
CO2 SERPL-SCNC: 28 MMOL/L — SIGNIFICANT CHANGE UP (ref 22–31)
CREAT SERPL-MCNC: 1.08 MG/DL — SIGNIFICANT CHANGE UP (ref 0.5–1.3)
D DIMER BLD IA.RAPID-MCNC: 442 NG/ML DDU — HIGH
EGFR: 68 ML/MIN/1.73M2 — SIGNIFICANT CHANGE UP
EOSINOPHIL # BLD AUTO: 2.99 K/UL — HIGH (ref 0–0.5)
EOSINOPHIL NFR BLD AUTO: 23.3 % — HIGH (ref 0–6)
GLUCOSE SERPL-MCNC: 101 MG/DL — HIGH (ref 70–99)
HCT VFR BLD CALC: 47.3 % — SIGNIFICANT CHANGE UP (ref 39–50)
HGB BLD-MCNC: 16.3 G/DL — SIGNIFICANT CHANGE UP (ref 13–17)
IMM GRANULOCYTES NFR BLD AUTO: 0.3 % — SIGNIFICANT CHANGE UP (ref 0–0.9)
INR BLD: 1.03 RATIO — SIGNIFICANT CHANGE UP (ref 0.85–1.18)
LYMPHOCYTES # BLD AUTO: 1.72 K/UL — SIGNIFICANT CHANGE UP (ref 1–3.3)
LYMPHOCYTES # BLD AUTO: 13.4 % — SIGNIFICANT CHANGE UP (ref 13–44)
MAGNESIUM SERPL-MCNC: 1.9 MG/DL — SIGNIFICANT CHANGE UP (ref 1.6–2.6)
MANUAL SMEAR VERIFICATION: SIGNIFICANT CHANGE UP
MCHC RBC-ENTMCNC: 33.5 PG — SIGNIFICANT CHANGE UP (ref 27–34)
MCHC RBC-ENTMCNC: 34.5 GM/DL — SIGNIFICANT CHANGE UP (ref 32–36)
MCV RBC AUTO: 97.1 FL — SIGNIFICANT CHANGE UP (ref 80–100)
MONOCYTES # BLD AUTO: 0.96 K/UL — HIGH (ref 0–0.9)
MONOCYTES NFR BLD AUTO: 7.5 % — SIGNIFICANT CHANGE UP (ref 2–14)
NEUTROPHILS # BLD AUTO: 7.02 K/UL — SIGNIFICANT CHANGE UP (ref 1.8–7.4)
NEUTROPHILS NFR BLD AUTO: 54.8 % — SIGNIFICANT CHANGE UP (ref 43–77)
PLAT MORPH BLD: NORMAL — SIGNIFICANT CHANGE UP
PLATELET # BLD AUTO: 273 K/UL — SIGNIFICANT CHANGE UP (ref 150–400)
POTASSIUM SERPL-MCNC: 4.4 MMOL/L — SIGNIFICANT CHANGE UP (ref 3.5–5.3)
POTASSIUM SERPL-SCNC: 4.4 MMOL/L — SIGNIFICANT CHANGE UP (ref 3.5–5.3)
PROT SERPL-MCNC: 7 GM/DL — SIGNIFICANT CHANGE UP (ref 6–8.3)
PROTHROM AB SERPL-ACNC: 11.6 SEC — SIGNIFICANT CHANGE UP (ref 9.5–13)
RBC # BLD: 4.87 M/UL — SIGNIFICANT CHANGE UP (ref 4.2–5.8)
RBC # FLD: 12.6 % — SIGNIFICANT CHANGE UP (ref 10.3–14.5)
RBC BLD AUTO: NORMAL — SIGNIFICANT CHANGE UP
SODIUM SERPL-SCNC: 140 MMOL/L — SIGNIFICANT CHANGE UP (ref 135–145)
TROPONIN I, HIGH SENSITIVITY RESULT: 14.93 NG/L — SIGNIFICANT CHANGE UP
TROPONIN I, HIGH SENSITIVITY RESULT: 15.8 NG/L — SIGNIFICANT CHANGE UP
WBC # BLD: 12.82 K/UL — HIGH (ref 3.8–10.5)
WBC # FLD AUTO: 12.82 K/UL — HIGH (ref 3.8–10.5)

## 2023-09-18 PROCEDURE — 71275 CT ANGIOGRAPHY CHEST: CPT | Mod: MA

## 2023-09-18 PROCEDURE — 85730 THROMBOPLASTIN TIME PARTIAL: CPT

## 2023-09-18 PROCEDURE — 99053 MED SERV 10PM-8AM 24 HR FAC: CPT

## 2023-09-18 PROCEDURE — 85379 FIBRIN DEGRADATION QUANT: CPT

## 2023-09-18 PROCEDURE — 71275 CT ANGIOGRAPHY CHEST: CPT | Mod: 26,MA

## 2023-09-18 PROCEDURE — 36415 COLL VENOUS BLD VENIPUNCTURE: CPT

## 2023-09-18 PROCEDURE — 71045 X-RAY EXAM CHEST 1 VIEW: CPT

## 2023-09-18 PROCEDURE — 80053 COMPREHEN METABOLIC PANEL: CPT

## 2023-09-18 PROCEDURE — 93005 ELECTROCARDIOGRAM TRACING: CPT

## 2023-09-18 PROCEDURE — 99285 EMERGENCY DEPT VISIT HI MDM: CPT | Mod: 25

## 2023-09-18 PROCEDURE — 93010 ELECTROCARDIOGRAM REPORT: CPT

## 2023-09-18 PROCEDURE — 71045 X-RAY EXAM CHEST 1 VIEW: CPT | Mod: 26

## 2023-09-18 PROCEDURE — 99285 EMERGENCY DEPT VISIT HI MDM: CPT

## 2023-09-18 PROCEDURE — 84484 ASSAY OF TROPONIN QUANT: CPT

## 2023-09-18 PROCEDURE — 85610 PROTHROMBIN TIME: CPT

## 2023-09-18 PROCEDURE — 83735 ASSAY OF MAGNESIUM: CPT

## 2023-09-18 PROCEDURE — 85025 COMPLETE CBC W/AUTO DIFF WBC: CPT

## 2023-09-18 NOTE — ED PROVIDER NOTE - OBJECTIVE STATEMENT
83-year-old male with  history of hypertension, coronary artery disease status post 1 stents, hyperlipidemia presents for evaluation of intermittent sharp right-sided chest pain that radiates to the midsternal region since approximately 4 days ago.  Patient notes that this is most prevalent when he wakes up in the morning.  Patient denies any exertional component to the pain or any other  exacerbating factors.  Patient states that the pain will come on suddenly and lasts for a few seconds.  Patient states that the pain is currently resolved.  Patient notes that he has chronic shortness of breath as he has a history of heavy smoking in the past but states that he has not smoked in approximately 40 years.  Patient denies any recent trauma, heavy lifting or surgery.  Patient denies any recent travel.  Patient denies any leg swelling or pain.  Patient is noted to have a left bundle branch block from EKG in 2019

## 2023-09-18 NOTE — ED ADULT NURSE NOTE - NSFALLUNIVINTERV_ED_ALL_ED
Bed/Stretcher in lowest position, wheels locked, appropriate side rails in place/Call bell, personal items and telephone in reach/Instruct patient to call for assistance before getting out of bed/chair/stretcher/Non-slip footwear applied when patient is off stretcher/[place] to call system/Physically safe environment - no spills, clutter or unnecessary equipment/Purposeful proactive rounding/Room/bathroom lighting operational, light cord in reach

## 2023-09-18 NOTE — ED PROVIDER NOTE - NSFOLLOWUPINSTRUCTIONS_ED_ALL_ED_FT
Chest Pain    Chest pain can be caused by many different conditions which may or may not be dangerous. Causes include heartburn, lung infections, heart attack, blood clot in lungs, skin infections, strain or damage to muscle, cartilage, or bones, etc. In addition to a history and physical examination, an electrocardiogram (ECG) or other lab tests may have been performed to determine the cause of your chest pain. Follow up with your primary care provider or with a cardiologist as instructed.     SEEK IMMEDIATE MEDICAL CARE IF YOU HAVE ANY OF THE FOLLOWING SYMPTOMS: worsening chest pain, coughing up blood, unexplained back/neck/jaw pain, severe abdominal pain, dizziness or lightheadedness, fainting, shortness of breath, sweaty or clammy skin, vomiting, or racing heart beat. These symptoms may represent a serious problem that is an emergency. Do not wait to see if the symptoms will go away. Get medical help right away. Call 911 and do not drive yourself to the hospital.      Follow-up with Dr. Cassidy  within the next 1 to 2 days

## 2023-09-18 NOTE — ED ADULT TRIAGE NOTE - ARRIVAL FROM
PATIENT INSTRUCTIONS    Treatment:  - Continue wearing the wrist widget as needed.     - Continue icing as needed.     - Continue completing the home exercises, specifically the range of motion.    - You are given a new work note stating return to work without restrictions, reaching maximum medical improvement (MMI).       Follow-Up:  Discharged - no further treatment anticipated.               Time left: 5/18/2020 8:48 AM     Next appointment:        Location:       Provider:         Please note: 24 hour notice for cancellation of appointment is required.    You may receive a survey in the mail, or via the e-mail address that you have provided.  We would appreciate if you could fill out the survey and provide us with any feedback on your experience regarding your visit today. Thank you for allowing us to provide you with your health care needs.     Do not hesitate to call if you are experiencing severe pain, worsening or change in your pain, have symptoms of infection (fever, warmth, redness, increased drainage), or have any other problem that concerns you ~ [phone] (or [phone] after hours).    Please remember when requesting refills on pain medication that the request should be made by Thursday at the latest. [facility] is open Monday-Friday, 8am-5pm, and closed on the weekends.  No narcotic refills will be filled after hours.    Additional Educational Resources:  For additional resources regarding your symptoms, diagnosis, or further health information, please visit the Health Resources section on [email or web address] or the Online Health Resources section in [employer].        
Home

## 2023-09-18 NOTE — ED ADULT NURSE NOTE - OBJECTIVE STATEMENT
83 yom c/o cp since thursday; denies n/v/d, denies weakness or sob, denies any trauma, denies tingling.

## 2023-09-18 NOTE — ED ADULT TRIAGE NOTE - CHIEF COMPLAINT QUOTE
Pt. presents to ED c/o chest pain. Pt. reports pain began Thursday and worsened tonight. Pt. Reports pain begins in the middle of his chest and radiates to the right. Pt. endorses hx. stents. Pt. taken for immediate EKG

## 2023-09-18 NOTE — ED PROVIDER NOTE - PATIENT PORTAL LINK FT
You can access the FollowMyHealth Patient Portal offered by [facility] by registering at the following website: [email or web address]. By joining [employer]’s FollowMyHealth portal, you will also be able to view your health information using other applications (apps) compatible with our system.

## 2023-09-18 NOTE — ED PROVIDER NOTE - CLINICAL SUMMARY MEDICAL DECISION MAKING FREE TEXT BOX
83-year-old male with  history of hypertension, coronary artery disease status post 1 stents, hyperlipidemia presents for evaluation of intermittent sharp right-sided chest pain that radiates to the midsternal region since approximately 4 days ago.  Patient notes that this is most prevalent when he wakes up in the morning.  Patient denies any exertional component to the pain or any other  exacerbating factors.  Patient states that the pain will come on suddenly and lasts for a few seconds.  Patient states that the pain is currently resolved.  Patient notes that he has chronic shortness of breath as he has a history of heavy smoking in the past but states that he has not smoked in approximately 40 years.  Patient denies any recent trauma, heavy lifting or surgery.  Patient denies any recent travel.  Patient denies any leg swelling or pain.  Patient is noted to have a left bundle branch block from EKG in 2019.  Will get cardiac workup including CXR, EKG, monitor, Troponin - I x 2, and D-dimer to r/o PE.

## 2023-12-18 ENCOUNTER — EMERGENCY (EMERGENCY)
Facility: HOSPITAL | Age: 83
LOS: 0 days | Discharge: HOME | End: 2023-12-18
Attending: EMERGENCY MEDICINE
Payer: MEDICARE

## 2023-12-18 VITALS — HEIGHT: 70 IN | WEIGHT: 251.99 LBS

## 2023-12-18 VITALS — SYSTOLIC BLOOD PRESSURE: 114 MMHG | DIASTOLIC BLOOD PRESSURE: 63 MMHG

## 2023-12-18 DIAGNOSIS — M25.561 PAIN IN RIGHT KNEE: ICD-10-CM

## 2023-12-18 DIAGNOSIS — I25.10 ATHEROSCLEROTIC HEART DISEASE OF NATIVE CORONARY ARTERY WITHOUT ANGINA PECTORIS: ICD-10-CM

## 2023-12-18 DIAGNOSIS — Z96.653 PRESENCE OF ARTIFICIAL KNEE JOINT, BILATERAL: ICD-10-CM

## 2023-12-18 DIAGNOSIS — Z96.643 PRESENCE OF ARTIFICIAL HIP JOINT, BILATERAL: Chronic | ICD-10-CM

## 2023-12-18 DIAGNOSIS — E78.00 PURE HYPERCHOLESTEROLEMIA, UNSPECIFIED: ICD-10-CM

## 2023-12-18 DIAGNOSIS — Z95.5 PRESENCE OF CORONARY ANGIOPLASTY IMPLANT AND GRAFT: ICD-10-CM
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
## 2023-12-18 DIAGNOSIS — Z98.890 OTHER SPECIFIED POSTPROCEDURAL STATES: Chronic | ICD-10-CM

## 2023-12-18 DIAGNOSIS — Z98.49 CATARACT EXTRACTION STATUS, UNSPECIFIED EYE: Chronic | ICD-10-CM
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
## 2023-12-18 DIAGNOSIS — I48.91 UNSPECIFIED ATRIAL FIBRILLATION: ICD-10-CM

## 2023-12-18 DIAGNOSIS — Z86.39 PERSONAL HISTORY OF OTHER ENDOCRINE, NUTRITIONAL AND METABOLIC DISEASE: ICD-10-CM

## 2023-12-18 DIAGNOSIS — Z79.82 LONG TERM (CURRENT) USE OF ASPIRIN: ICD-10-CM

## 2023-12-18 DIAGNOSIS — Z90.89 ACQUIRED ABSENCE OF OTHER ORGANS: Chronic | ICD-10-CM

## 2023-12-18 DIAGNOSIS — Z91.040 LATEX ALLERGY STATUS: ICD-10-CM

## 2023-12-18 DIAGNOSIS — Z88.5 ALLERGY STATUS TO NARCOTIC AGENT: ICD-10-CM

## 2023-12-18 DIAGNOSIS — S80.01XA CONTUSION OF RIGHT KNEE, INITIAL ENCOUNTER: ICD-10-CM

## 2023-12-18 DIAGNOSIS — M19.90 UNSPECIFIED OSTEOARTHRITIS, UNSPECIFIED SITE: ICD-10-CM

## 2023-12-18 DIAGNOSIS — Z86.79 PERSONAL HISTORY OF OTHER DISEASES OF THE CIRCULATORY SYSTEM: ICD-10-CM

## 2023-12-18 DIAGNOSIS — Z98.61 CORONARY ANGIOPLASTY STATUS: Chronic | ICD-10-CM

## 2023-12-18 DIAGNOSIS — I10 ESSENTIAL (PRIMARY) HYPERTENSION: ICD-10-CM

## 2023-12-18 DIAGNOSIS — Z96.653 PRESENCE OF ARTIFICIAL KNEE JOINT, BILATERAL: Chronic | ICD-10-CM

## 2023-12-18 DIAGNOSIS — Z79.02 LONG TERM (CURRENT) USE OF ANTITHROMBOTICS/ANTIPLATELETS: ICD-10-CM

## 2023-12-18 DIAGNOSIS — K21.9 GASTRO-ESOPHAGEAL REFLUX DISEASE WITHOUT ESOPHAGITIS: ICD-10-CM

## 2023-12-18 PROCEDURE — 72125 CT NECK SPINE W/O DYE: CPT | Mod: MA

## 2023-12-18 PROCEDURE — 73552 X-RAY EXAM OF FEMUR 2/>: CPT | Mod: RT

## 2023-12-18 PROCEDURE — 99285 EMERGENCY DEPT VISIT HI MDM: CPT

## 2023-12-18 PROCEDURE — 70450 CT HEAD/BRAIN W/O DYE: CPT | Mod: 26,MA

## 2023-12-18 PROCEDURE — 73552 X-RAY EXAM OF FEMUR 2/>: CPT | Mod: 26,RT

## 2023-12-18 PROCEDURE — 73562 X-RAY EXAM OF KNEE 3: CPT | Mod: RT

## 2023-12-18 PROCEDURE — 70450 CT HEAD/BRAIN W/O DYE: CPT | Mod: MA

## 2023-12-18 PROCEDURE — 73590 X-RAY EXAM OF LOWER LEG: CPT | Mod: 26,RT

## 2023-12-18 PROCEDURE — 99284 EMERGENCY DEPT VISIT MOD MDM: CPT | Mod: 25

## 2023-12-18 PROCEDURE — 73502 X-RAY EXAM HIP UNI 2-3 VIEWS: CPT | Mod: RT

## 2023-12-18 PROCEDURE — 72125 CT NECK SPINE W/O DYE: CPT | Mod: 26,MA

## 2023-12-18 PROCEDURE — 73562 X-RAY EXAM OF KNEE 3: CPT | Mod: 26,RT,76

## 2023-12-18 PROCEDURE — 99053 MED SERV 10PM-8AM 24 HR FAC: CPT

## 2023-12-18 PROCEDURE — 73590 X-RAY EXAM OF LOWER LEG: CPT | Mod: RT

## 2023-12-18 PROCEDURE — 73502 X-RAY EXAM HIP UNI 2-3 VIEWS: CPT | Mod: 26,RT

## 2023-12-18 RX ORDER — OXYCODONE HYDROCHLORIDE 5 MG/1
5 TABLET ORAL ONCE
Refills: 0 | Status: DISCONTINUED | OUTPATIENT
Start: 2023-12-18 | End: 2023-12-18

## 2023-12-18 RX ADMIN — OXYCODONE HYDROCHLORIDE 5 MILLIGRAM(S): 5 TABLET ORAL at 13:03

## 2023-12-18 NOTE — CONSULT NOTE ADULT - SUBJECTIVE AND OBJECTIVE BOX
83M PMH HTN, CAD s/p 1 stent (Plavix and aspirin 81mg, last taken last night), R ALRFEDA ([facility], '99), R Rev ALFREDA ([facility], '10, '13), L ALFREDA ([facility]urt[facility], '99), R TKA ('97), L TKA ('99) who presents to ED with R knee pain and swelling after trip and fall this morning onto R knee. Ambulates independently at baseline. Denies other injuries, head injury/LOC, numbness, tingling, fevers, chills, CP, SOB, N/V/D.    Vital Signs (24 Hrs):  T(C): 36.6 (12-18-23 @ 07:08), Max: 36.6 (12-18-23 @ 07:08)  HR: 75 (12-18-23 @ 07:08) (75 - 75)  BP: 105/69 (12-18-23 @ 07:08) (105/69 - 105/69)  RR: 18 (12-18-23 @ 07:08) (18 - 18)  SpO2: 97% (12-18-23 @ 07:08) (97% - 97%)  Wt(kg): --    LABS:    Physical Exam:  General: NAD, pt laying in bed comfortably    RLE  Skin intact, with moderate swelling, TTP anterior R knee  Calf nontender  Compartments soft and compressible  Quad/patella tendons palpable  TTP along medial joint line  No ligamentous laxity noted, stable to varus/valgus stress and Lachman's/anterior/posterior drawer tests  Able to SLR and extend knee against resistance with significant pain  A/PROM 10-30 limited by pain  No Pain w/ micromotion  + EHL/FHL/GS/TA/Q/H  SILT SPN/DPN/Tib/Sural/Saph  +DP    Imaging:  XR R Knee: swelling anterior knee no obvious fx, dislocation, hardware loosening noted    **INCOMPLETE** **INCOMPLETE** **INCOMPLETE** **INCOMPLETE** **INCOMPLETE** **INCOMPLETE** **INCOMPLETE**          83M PMH HTN, CAD s/p 1 stent (Plavix and aspirin 81mg, last taken last night), R ALFREDA ([facility], '99), R Rev ALFREDA ([facility], '10, '13), L ALFREDA ([facility]urtowski, '99), R TKA ('97), L TKA ('99) who presents to ED with R knee pain and swelling after trip and fall this morning onto R knee. Ambulates independently at baseline. Denies other injuries, head injury/LOC, numbness, tingling, fevers, chills, CP, SOB, N/V/D.    Vital Signs (24 Hrs):  T(C): 36.6 (12-18-23 @ 07:08), Max: 36.6 (12-18-23 @ 07:08)  HR: 75 (12-18-23 @ 07:08) (75 - 75)  BP: 105/69 (12-18-23 @ 07:08) (105/69 - 105/69)  RR: 18 (12-18-23 @ 07:08) (18 - 18)  SpO2: 97% (12-18-23 @ 07:08) (97% - 97%)  Wt(kg): --    LABS:    Physical Exam:  General: NAD, pt laying in bed comfortably    RLE  Skin intact, with moderate swelling, TTP anterior R knee  Calf nontender  Compartments soft and compressible  Quad/patella tendons palpable  TTP along medial joint line  No ligamentous laxity noted, stable to varus/valgus stress and Lachman's/anterior/posterior drawer tests  Able to SLR and extend knee against resistance with significant pain  A/PROM 10-30 limited by pain  No Pain w/ micromotion  + EHL/FHL/GS/TA/Q/H  SILT SPN/DPN/Tib/Sural/Saph  +DP    Imaging:  XR R Knee: swelling anterior knee no obvious fx, dislocation, hardware loosening noted    **INCOMPLETE** **INCOMPLETE** **INCOMPLETE** **INCOMPLETE** **INCOMPLETE** **INCOMPLETE** **INCOMPLETE**          83M PMH HTN, CAD s/p 1 stent (Plavix and aspirin 81mg, last taken last night), R ALFREDA ([facility], '99), R Rev ALFREDA ([facility], '10, '13), L ALFREDA ([facility]urtowski, '99), R TKA ('97), L TKA ('99) who presents to ED with R knee pain and swelling after trip and fall this morning onto R knee. Ambulates independently at baseline. Denies other injuries, head injury/LOC, numbness, tingling, fevers, chills, CP, SOB, N/V/D.    Vital Signs (24 Hrs):  T(C): 36.6 (12-18-23 @ 07:08), Max: 36.6 (12-18-23 @ 07:08)  HR: 75 (12-18-23 @ 07:08) (75 - 75)  BP: 105/69 (12-18-23 @ 07:08) (105/69 - 105/69)  RR: 18 (12-18-23 @ 07:08) (18 - 18)  SpO2: 97% (12-18-23 @ 07:08) (97% - 97%)  Wt(kg): --    LABS:    Physical Exam:  General: NAD, pt laying in bed comfortably    RLE  Skin intact, with moderate swelling, TTP anterior R knee  Calf nontender  Compartments soft and compressible  Quad/patella tendons palpable  TTP along medial joint line  No ligamentous laxity noted, stable to varus/valgus stress and Lachman's/anterior/posterior drawer tests  Able to SLR and extend knee against resistance with significant pain  A/PROM 10-30 limited by pain  No Pain w/ micromotion  + EHL/FHL/GS/TA/Q/H  SILT SPN/DPN/Tib/Sural/Saph  +DP    Imaging:  XR R Knee: swelling anterior knee no obvious fx, dislocation, hardware loosening noted    A/P: 83M who presents with R knee pain and swelling s/p fall  Pain and swelling likely related to Plavix    WBAT with compressive ACE wrap  PT/OT  Analgesics  Incentive spirometry  No acute orthopaedic interventions at this time  Stable for discharge from orthopaedic standpoint   Recommend follow up with original surgeon Dr. Jonah Wadsworth in office for further management  Ortho to sign off, can reconsult with any changes in clinical course  Discussed plan with Dr. 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 Ambulates independently at baseline. Denies other injuries, head injury/LOC, numbness, tingling, fevers, chills, CP, SOB, N/V/D. Denies prior right hip pain before fall.    Vital Signs (24 Hrs):  T(C): 36.6 (12-18-23 @ 07:08), Max: 36.6 (12-18-23 @ 07:08)  HR: 75 (12-18-23 @ 07:08) (75 - 75)  BP: 105/69 (12-18-23 @ 07:08) (105/69 - 105/69)  RR: 18 (12-18-23 @ 07:08) (18 - 18)  SpO2: 97% (12-18-23 @ 07:08) (97% - 97%)  Wt(kg): --    LABS:    Physical Exam:  General: NAD, pt laying in bed comfortably    RLE  TKA and ALFREDA incisions CDI with no surrounding erythema or drainage  Skin intact, with moderate swelling, TTP anterior R knee  +ecchymosis over knee  Calf nontender  Compartments soft and compressible  Quad/patella tendons palpable  TTP along medial joint line  No ligamentous laxity noted, stable to varus/valgus stress and anterior/posterior drawer tests  Able to SLR and extend knee against resistance with significant pain  A/PROM knee 10-30 limited by pain  No Pain w/ micromotion  + EHL/FHL/GS/TA/Q/H  SILT SPN/DPN/Tib/Sural/Saph  +DP    Imaging:  XR R Knee: swelling anterior knee no obvious fx, dislocation, hardware loosening noted  XR R hip: significant osteolysis     A/P: 83M who presents with R knee pain and swelling s/p fall  Pain and swelling likely related to hematoma from Plavix use    WBAT with compressive ACE wrap  PT/OT  Analgesics  Incentive spirometry  No acute orthopaedic interventions at this time  Stable for discharge from orthopaedic standpoint   Recommend follow up with original surgeon Dr. Jonah Wadsworth in office for further management of R ALFREDA osteolysis  Ortho to sign off, can reconsult with any changes in clinical course  Discussed plan with Dr. 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 Ambulates independently at baseline. Denies other injuries, head injury/LOC, numbness, tingling, fevers, chills, CP, SOB, N/V/D. Denies prior right hip pain before fall.    Vital Signs (24 Hrs):  T(C): 36.6 (12-18-23 @ 07:08), Max: 36.6 (12-18-23 @ 07:08)  HR: 75 (12-18-23 @ 07:08) (75 - 75)  BP: 105/69 (12-18-23 @ 07:08) (105/69 - 105/69)  RR: 18 (12-18-23 @ 07:08) (18 - 18)  SpO2: 97% (12-18-23 @ 07:08) (97% - 97%)  Wt(kg): --    LABS:    Physical Exam:  General: NAD, pt laying in bed comfortably    RLE  TKA and ALFREDA incisions CDI with no surrounding erythema or drainage  Skin intact, with moderate swelling, TTP anterior R knee  +ecchymosis over knee  Calf nontender  Compartments soft and compressible  Quad/patella tendons palpable  TTP along medial joint line  No ligamentous laxity noted, stable to varus/valgus stress and anterior/posterior drawer tests  Able to SLR and extend knee against resistance with significant pain  A/PROM knee 10-30 limited by pain  No Pain w/ micromotion  + EHL/FHL/GS/TA/Q/H  SILT SPN/DPN/Tib/Sural/Saph  +DP    Imaging:  XR R Knee: swelling anterior knee no obvious fx, dislocation, hardware loosening noted  XR R hip: significant osteolysis     A/P: 83M who presents with R knee pain and swelling s/p fall  Pain and swelling likely related to hematoma from Plavix use    WBAT with compressive ACE wrap  PT/OT  Analgesics  Incentive spirometry  No acute orthopaedic interventions at this time  Stable for discharge from orthopaedic standpoint   Recommend follow up with original surgeon Dr. Jonah Wadsworth in office for further management of R ALFREDA osteolysis  Ortho to sign off, can reconsult with any changes in clinical course  Discussed plan with Dr. Anderson who agrees with above

## 2023-12-18 NOTE — ED PROVIDER NOTE - OBJECTIVE STATEMENT
82 y/o male with a PMHx of Afib, CAD s/p stents, GERD, HTN, hypercholesterolemia, obesity, osteoarthritis, thoracic aortic aneurysm, h/o right knee surgery presents to the ED s/p trip and fall on rug at home. No head trauma. No LOC. On Plavix and 81mg ASA. Pt c/o right knee pain. No other complaints at this time. 84 y/o male with a PMHx of Afib, CAD s/p stents, GERD, HTN, hypercholesterolemia, obesity, osteoarthritis, thoracic aortic aneurysm, h/o right knee surgery presents to the ED s/p trip and fall on rug at home. No head trauma. No LOC. On Plavix and 81mg ASA. Pt c/o right knee pain. No other complaints at this time.

## 2023-12-18 NOTE — CHART NOTE - NSCHARTNOTEFT_GEN_A_CORE
Pt is an 83 yr old male with a PMHx of HTN, CAD s/p 1 stent,  obesity, osteoarthritis, thoracic aortic aneurysm, h/o right knee surgery, and hyperlipidemia. Pt presented ambulatory to  ED from home s/p a trip and fall on a rug at home. Per ortho note, "No acute orthopaedic interventions at this time. Stable for discharge from orthopaedic standpoint."   Pt lives with  adult son in a 1-story home, ramp to enter. Pt is independent at baseline. Ambulation trial notes pt will benefit from a r/walker. Consignment and script forms signed and uploaded. SW dispensed  r/walker to pt in Waiting Room. Pt son to provide transport home. Case discussed with MD and RN.

## 2023-12-18 NOTE — ED PROVIDER NOTE - CLINICAL SUMMARY MEDICAL DECISION MAKING FREE TEXT BOX
Pt with right knee pain s/p fall. Plan: imaging, meds, ortho consult. Pt with right knee pain s/p fall. Plan: imaging, meds, ortho consult.      1530 pt will benefit from rolling walker for ambulation [facility] EPPS

## 2023-12-18 NOTE — ED ADULT NURSE NOTE - NSFALLRISKINTERV_ED_ALL_ED
Assistance OOB with selected safe patient handling equipment if applicable/Assistance with ambulation/Communicate fall risk and risk factors to all staff, patient, and family/Monitor gait and stability/Provide visual cue: yellow wristband, yellow gown, etc/Reinforce activity limits and safety measures with patient and family/Call bell, personal items and telephone in reach/Instruct patient to call for assistance before getting out of bed/chair/stretcher/Non-slip footwear applied when patient is off stretcher/[place] to call system/Physically safe environment - no spills, clutter or unnecessary equipment/Purposeful Proactive Rounding/Room/bathroom lighting operational, light cord in reach Assistance OOB with selected safe patient handling equipment if applicable/Assistance with ambulation/Communicate fall risk and risk factors to all staff, patient, and family/Monitor gait and stability/Provide visual cue: yellow wristband, yellow gown, etc/Reinforce activity limits and safety measures with patient and family/Call bell, personal items and telephone in reach/Instruct patient to call for assistance before getting out of bed/chair/stretcher/Non-slip footwear applied when patient is off stretcher/[place] to call system/Physically safe environment - no spills, clutter or unnecessary equipment/Purposeful Proactive Rounding/Room/bathroom lighting operational, light cord in reach

## 2023-12-18 NOTE — ED PROVIDER NOTE - NSFOLLOWUPINSTRUCTIONS_ED_ALL_ED_FT
Contusion  A contusion is a deep bruise. This is a result of an injury that causes bleeding under the skin. Symptoms of bruising include pain, swelling, and discolored skin. The skin may turn blue, purple, or yellow.    Follow these instructions at home:  Managing pain, stiffness, and swelling    Bag of ice on a towel on the skin.  You may use RICE. This stands for:  Resting.  Icing.  Compression, or putting pressure on the injured area.  Elevating, or raising the injured area.  To follow this method, do these actions:  Rest the injured area.  If told, put ice on the injured area. To do this:  Put ice in a plastic bag.  Place a towel between your skin and the bag.  Leave the ice on for 20 minutes, 2–3 times per day.  If your skin turns bright red, take off the ice right away to prevent skin damage. The risk of skin damage is higher if you cannot feel pain, heat, or cold.  If told, apply compression on the injured area using an elastic bandage. Make sure the bandage is not too tight. If the area tingles or has a loss of feeling (numbness), remove it and put it back on as told by your doctor.  If possible, elevate the injured area above the level of your heart while you are sitting or lying down.  General instructions    Take over-the-counter and prescription medicines only as told by your doctor.  Keep all follow-up visits. Your doctor may want to see how your contusion is healing with treatment.  Contact a doctor if:  Your symptoms do not get better after several days of treatment.  Your symptoms get worse.  You have trouble moving the injured area.  Get help right away if:  You have very bad pain.  You have a loss of feeling (numbness) in a hand or foot.  Your hand or foot turns pale or cold.  This information is not intended to replace advice given to you by your health care provider. Make sure you discuss any questions you have with your health care provider.    Document Revised: 06/05/2023 Document Reviewed: 06/05/2023  [employer] Patient Education © 2023 [employer]  [employer] logo  Terms and Conditions  Privacy Policy  Editorial Policy  All content on this site: Copyright © 2023 [employer], its licensors, and contributors. All rights are reserved, including those for text and data mining, [employer] training, and similar technologies. For all open access content, the Creative Commons licensing terms apply.  Cookies are used by this site. To decline or learn more, visit our Cookies page. Contusion  A contusion is a deep bruise. This is a result of an injury that causes bleeding under the skin. Symptoms of bruising include pain, swelling, and discolored skin. The skin may turn blue, purple, or yellow.    Follow these instructions at home:  Managing pain, stiffness, and swelling    Bag of ice on a towel on the skin.  You may use RICE. This stands for:  Resting.  Icing.  Compression, or putting pressure on the injured area.  Elevating, or raising the injured area.  To follow this method, do these actions:  Rest the injured area.  If told, put ice on the injured area. To do this:  Put ice in a plastic bag.  Place a towel between your skin and the bag.  Leave the ice on for 20 minutes, 2–3 times per day.  If your skin turns bright red, take off the ice right away to prevent skin damage. The risk of skin damage is higher if you cannot feel pain, heat, or cold.  If told, apply compression on the injured area using an elastic bandage. Make sure the bandage is not too tight. If the area tingles or has a loss of feeling (numbness), remove it and put it back on as told by your doctor.  If possible, elevate the injured area above the level of your heart while you are sitting or lying down.  General instructions    Take over-the-counter and prescription medicines only as told by your doctor.  Keep all follow-up visits. Your doctor may want to see how your contusion is healing with treatment.  Contact a doctor if:  Your symptoms do not get better after several days of treatment.  Your symptoms get worse.  You have trouble moving the injured area.  Get help right away if:  You have very bad pain.  You have a loss of feeling (numbness) in a hand or foot.  Your hand or foot turns pale or cold.  This information is not intended to replace advice given to you by your health care provider. Make sure you discuss any questions you have with your health care provider.    Document Revised: 06/05/2023 Document Reviewed: 06/05/2023  [employer] Patient Education © 2023 [employer]  [employer] logo  Terms and Conditions  Privacy Policy  Editorial Policy  All content on this site: Copyright © 2023 [employer], its licensors, and contributors. All rights are reserved, including those for text and data mining, [employer] training, and similar technologies. For all open access content, the Creative Commons licensing terms apply.  Cookies are used by this site. To decline or learn more, visit our Cookies page.

## 2023-12-18 NOTE — ED PROVIDER NOTE - PATIENT PORTAL LINK FT
You can access the FollowMyHealth Patient Portal offered by [facility] by registering at the following website: [email or web address]. By joining [employer]’s FollowMyHealth portal, you will also be able to view your health information using other applications (apps) compatible with our system. You can access the FollowMyHealth Patient Portal offered by [facility] by registering at the following website: [email or web address]. By joining [employer]’s FollowMyHealth portal, you will also be able to view your health information using other applications (apps) compatible with our system.

## 2023-12-18 NOTE — ED PROVIDER NOTE - NSICDXPASTSURGICALHX_GEN_ALL_CORE_FT
PAST SURGICAL HISTORY:  H/O bilateral hip replacements right 1999,  2003 left  revision right THR - 2010 and 2013    H/O colonoscopy 2014    H/O endoscopy 2017    H/o total knee replacement, bilateral right 1997, left 1999    History of PTCA 1 2013 x 1 stent    S/P cataract surgery b/l    S/P tonsillectomy 11 y/o     PAST SURGICAL HISTORY:  H/O bilateral hip replacements right 1999,  2003 left  revision right THR - 2010 and 2013    H/O colonoscopy 2014    H/O endoscopy 2017    H/o total knee replacement, bilateral right 1997, left 1999    History of PTCA 1 2013 x 1 stent    S/P cataract surgery b/l    S/P tonsillectomy 13 y/o

## 2023-12-18 NOTE — ED ADULT NURSE NOTE - NS ED NOTE  TALK SOMEONE YN
Daily Progress Note - Critical Care   Bradley Rodriguez 68 y o  male MRN: [id number]  Unit/Bed#: MICU 04 Encounter: [id number]        ----------------------------------------------------------------------------------------  HPI/24hr events:   Overnight, Seroquel discontinued and trazodone started to improve quality of sleep  Home antihypertensives resumed  Given 3 doses of prn labetalol overnight  Started on lantus  Lynch d/gene'nina, no urinary complaints       ---------------------------------------------------------------------------------------  SUBJECTIVE  Patient reports he slept well last night  Persists with cough and complaint of shortness of breath  Admits to generalized weakness and fatigue  Review of Systems   Constitutional: Positive for fatigue  Negative for chills and fever  Respiratory: Positive for cough and shortness of breath  Cardiovascular: Negative for chest pain  Gastrointestinal: Negative for abdominal pain, nausea and vomiting  Genitourinary: Negative for difficulty urinating and dysuria  Skin: Negative for pallor  Neurological: Positive for weakness  Negative for numbness and headaches  All other systems reviewed and are negative  Review of systems was reviewed and negative unless stated above in HPI/24-hour events   ---------------------------------------------------------------------------------------  Assessment and Plan:    Neuro:   · Diagnosis: Anxiety/depression  ? Plan:   § Now on trazodone, continue qhs  § Lexapro 10mg per J tube, consider increase to 20mg  § Continue Remeron qhs  · Delirium precautions, CAM ICU daily, regulate sleep/wake cycle  ? Melatonin at night  · Analgesia: Tylenol  ? Plan: pain well controlled per patient report     CV:   · Diagnosis: Shock, likely septic- resolved; Hypertension  ?  Plan:   § MAP > 65  § Continue home lisinopril  § PRN labetalol and hydralazine ordered for SBP > 170  · Diagnosis: History of atrial fibrillation  ? Plan:   § Continue AC with warfarin  § Maintain rate control  § Continue PO amio  § Continue aspirin        Pulm:  · Diagnosis: Acute hypoxic respiratory failure; Flash pulmonary edema, resolved  ? Plan:   § Continue HFNC (patient not a Bipap candidate 2/2 esophageal stents), wean as tolerated   § Maintain O2 sat > 88%  § ATC Robitussin, Continue Xoponex and NaCl nebs  § Peridex ordered  § Flutter valve  · Diagnosis: Aspiration pneumonia   ? Plan:   § ID following, appreciate recs  § Sputum Culture: Pseudomonas aeruginosa, Proteus mirabilis, Klebsiella pneumoniae  § Abx changed to Zosyn on 7/14 based on culture data  Continue abx as per ID        GI:   · Diagnosis: Esophageal adenocarcinoma s/p esophageal resection, complicated by anastomotic leak with stent placement x 2  ? Plan:   § Thoracic surgery following, appreciate recs  § Plan for eventual repeat swallow study to evaluate leak  § Pain control  § Continue Prilosec  · Diagnosis: Diarrhea, improving  ? Patient reports having one small, loose bowel movement yesterday  § C  Diff negative, no prior hx of C  diff  ? Plan:   § Continue to monitor  § Imodium started           :   · Diagnosis: Decreased urine output  ? Patient achieved adequate diuresis with Lasix  ? Plan:   § D/c'd Lynch  § Continue to monitor closely  § Trend renal indices           F/E/N:   · F: Isolyte at 10cc/hr to augment TF  · E: Replete as needed, goal K >4 0, Phos > 3 0, Mg > 2 0  · N: Strict NPO, continue J tube feedings        Heme/Onc:   · Diagnosis: anemia of chronic disease, esophageal adenocarcinoma s/p chemo, radiation and surgery  ? Plan:   § Continue to monitor CBC  § Maintain Port a cath  · Diagnosis: Hx SMV thrombus  ? Plan: Continue AC (warfarin) and SCDs        Endo:   · Diagnosis: Diabetes mellitus  ?  Plan:   § Continue SSI with accuchecks  § Continue lantus 10u qhs        ID:   · Diagnosis: Mediastinal abscess s/p posterior drain, aspiration pneumonia  ? Plan:   § ID following  Currently receiving Zosyn, determine end dates  § Monitor mediastinal drain output  § Per nursing, there is purulent drainage surrounding the mediastinal drain with minimal drainage in the bulb  § Flush drain with 10cc sterile saline q8h  § Trend WBC/fever curve  Remains afebrile without leukocytosis  § Blood cultures: No growth after 5 days  § Sputum culture as above  § MRSA culture negative        MSK/Skin:   · Diagnosis: No active issues  ? Plan:   § Frequent turns/repositioning, offload pressure sites q2h  § Continue with PT/OT, seen OOB to chair today  § Allevyn per protocol      Disposition: Continue Critical Care   Code Status: Level 2 - DNAR: but accepts endotracheal intubation  ---------------------------------------------------------------------------------------  ICU CORE MEASURES    Prophylaxis   VTE Pharmacologic Prophylaxis: Warfarin (Coumadin)  VTE Mechanical Prophylaxis: sequential compression device  Stress Ulcer Prophylaxis: Omeprazole PO    ABCDE Protocol (if indicated)  Plan to perform spontaneous awakening trial today? Not applicable  Plan to perform spontaneous breathing trial today? Not applicable  Obvious barriers to extubation? Not applicable  CAM-ICU: Negative    Invasive Devices Review  Invasive Devices     Central Venous Catheter Line            Port A Cath 02/25/20 Right Chest 141 days          Peripheral Intravenous Line            Peripheral IV 07/15/20 Left Antecubital less than 1 day          Drain            Gastrostomy/Enterostomy Jejunostomy 14 Fr  LUQ 55 days    Closed/Suction Drain Medial;Posterior Mediastinal Bulb 14 Fr  30 days              Can any invasive devices be discontinued today?  No  ---------------------------------------------------------------------------------------  OBJECTIVE    Vitals   Vitals:    07/16/20 0329 07/16/20 0400 07/16/20 0600 07/16/20 0730   BP:  160/77 (!) 179/83    BP Location:  Right arm     Pulse:  82 102 No Resp:  15 (!) 44    Temp:  99 1 °F (37 3 °C)     TempSrc:  Oral     SpO2: 90% 92% 91% 90%   Weight:       Height:         Temp (24hrs), Av 9 °F (37 2 °C), Min:97 8 °F (36 6 °C), Max:100 °F (37 8 °C)  Current: Temperature: 99 1 °F (37 3 °C)  HR: 91  BP: 162/72   RR: 40  SpO2: 92% on HFNC    Respiratory:  SpO2: SpO2: 92 %, SpO2 Activity: SpO2 Activity: At Rest, SpO2 Device: O2 Device: High flow nasal cannula  Nasal Cannula O2 Flow Rate (L/min): 4 L/min(decreased to 3)    Invasive/non-invasive ventilation settings   Respiratory    Lab Data (Last 4 hours)    None         O2/Vent Data (Last 4 hours)       0329          Non-Invasive Ventilation Mode HFNC                   Physical Exam   Constitutional: He appears well-developed and well-nourished  Patient appears mildly distressed but otherwise comfortable in exam bed   HENT:   Head: Normocephalic and atraumatic  Eyes: Conjunctivae and EOM are normal    Neck: Normal range of motion  Neck supple  Cardiovascular: Normal rate, regular rhythm, normal heart sounds and intact distal pulses  No murmur heard  Pulmonary/Chest: Tachypnea noted  He is in respiratory distress  He has decreased breath sounds  He has no wheezes  He has rhonchi  On HFNC 50L 55%, sat 92%   Abdominal: Soft  Bowel sounds are normal  He exhibits no distension  There is no tenderness  J tube in position   Musculoskeletal: Normal range of motion  Neurological: He is alert  Generalized weakness   Skin: Skin is warm and dry  Capillary refill takes less than 2 seconds  Psychiatric: He exhibits a depressed mood  Nursing note and vitals reviewed        Laboratory and Diagnostics:  Results from last 7 days   Lab Units 20  0522 07/15/20  0508 20  0440 20  0525 20  0451 20  0443 07/10/20  1058   WBC Thousand/uL 10 06 10 05 8 87 10 97* 8 36 11 18* 13 06*   HEMOGLOBIN g/dL 9 3* 9 1* 8 7* 9 2* 8 3* 8 3* 9 2*   HEMATOCRIT % 31 2* 29 4* 28 5* 30 0* 27 1* 27 8* 31 2*   PLATELETS Thousands/uL 359 368 330 322 269 310 365   NEUTROS PCT % 83* 83* 86*  --  87*  --  86*   BANDS PCT %  --   --   --   --   --  11*  --    MONOS PCT % 8 9 7  --  6  --  7   MONO PCT %  --   --   --   --   --  2*  --      Results from last 7 days   Lab Units 07/16/20  0522 07/15/20  0508 07/14/20  0440 07/13/20  1632 07/13/20  0525 07/12/20  0451 07/11/20  0838 07/11/20  0443   SODIUM mmol/L 138 139 138 137 139 139 138 136   POTASSIUM mmol/L 3 9 3 1* 3 3* 3 1* 2 8* 3 6 3 9 3 9   CHLORIDE mmol/L 102 100 102 102 102 105 104 102   CO2 mmol/L 31 33* 32 29 29 29 29 29   ANION GAP mmol/L 5 6 4 6 8 5 5 5   BUN mg/dL 11 13 11 11 11 12 8 10   CREATININE mg/dL 0 53* 0 61 0 64 0 72 0 70 0 59* 0 57* 0 64   CALCIUM mg/dL 8 5 8 5 8 3 8 1* 8 1* 8 4 8 5 8 0*   GLUCOSE RANDOM mg/dL 186* 204* 218* 242* 204* 162* 144* 169*   ALT U/L  --   --   --   --   --   --   --  11*   AST U/L  --   --   --   --   --   --   --  10   ALK PHOS U/L  --   --   --   --   --   --   --  69   ALBUMIN g/dL  --   --   --   --   --   --   --  2 0*   TOTAL BILIRUBIN mg/dL  --   --   --   --   --   --   --  0 43     Results from last 7 days   Lab Units 07/16/20  0522 07/15/20  0508 07/14/20  0440 07/11/20  0443   MAGNESIUM mg/dL  --  2 1 2 1 1 9   PHOSPHORUS mg/dL 3 0 2 6 1 7* 2 9      Results from last 7 days   Lab Units 07/16/20  0522 07/15/20  0508 07/14/20  0440 07/13/20  0525 07/12/20  0451 07/11/20  0838   INR  2 25* 2 04* 1 89* 1 83* 2 05* 2 38*          Results from last 7 days   Lab Units 07/11/20  0443 07/10/20  2344 07/10/20  2049   LACTIC ACID mmol/L 1 8 3 1* 4 2*     ABG:    VBG:          Micro  Results from last 7 days   Lab Units 07/13/20  1116 07/12/20  1030 07/10/20  2049 07/10/20  1619   BLOOD CULTURE   --   --  No Growth After 5 Days    --    SPUTUM CULTURE   --   --   --  3+ Growth of Pseudomonas aeruginosa*  3+ Growth of Proteus mirabilis*  3+ Growth of Klebsiella pneumoniae*  3+ Growth of    GRAM STAIN RESULT   -- --   --  3+ Gram negative rods*  3+ Gram positive cocci in pairs and chains*  No polys seen*   MRSA CULTURE ONLY   --  No Methicillin Resistant Staphlyococcus aureus (MRSA) isolated  --   --    C DIFF TOXIN B  Negative  --   --   --        EKG: sinus rhythm on tele  Imaging: Xr Chest Portable  Result Date: 7/15/2020  Impression: Esophagectomy and gastric pull-up with well-expanded stent  No pneumothorax  Bilateral pneumonia, likely aspiration, improving on the right and increased on the left  Workstation performed: [id number]    I have personally reviewed pertinent reports  and I have personally reviewed pertinent films in PACS    Intake and Output  I/O       07/14 0701 - 07/15 0700 07/15 0701 - 07/16 0700    I V  (mL/kg) 112 4 (1 1) 30 (0 3)    NG/ 670    IV Piggyback 200 230    Feedings 1421 2071    Total Intake(mL/kg) 2398 4 (24 2) 3001 (30 2)    Urine (mL/kg/hr) 3767 (1 6) 1200 (0 5)    Emesis/NG output  0    Drains 3 10    Stool 0 0    Total Output 3770 1210    Net -1371 6 +1791          Unmeasured Urine Occurrence  1 x    Unmeasured Stool Occurrence 5 x 2 x        UOP: 1210 ml/hr     Height and Weights   Height: 6' (182 9 cm)  IBW: 77 6 kg  Body mass index is 29 69 kg/m²  Weight (last 2 days)     Date/Time   Weight    07/15/20 0600   99 3 (218 92)    07/14/20 0600   103 (227 52)                Nutrition       Diet Orders   (From admission, onward)             Start     Ordered    07/13/20 1606  Diet Enteral/Parenteral; Tube Feeding No Oral Diet; Jevity 1 5; Continuous; 75; Banatrol Plus Banana Flakes - Three Packets; 200;  Water; Every 6 hours  Diet effective now     Comments:  Start at 10 ml and titrate up 10ml/hr every 4 hours to goal 75 ml/hr   Question Answer Comment   Diet Type Enteral/Parenteral    Enteral/Parenteral Tube Feeding No Oral Diet    Tube Feeding Formula: Jevity 1 5    Bolus/Cyclic/Continuous Continuous    Tube Feeding Goal Rate (mL/hr): 75    Banatrol Plus Banana Flakes Banatrol Plus Banana Flakes - Three Packets    Tube Feeding water flush (mL): 200    Water Flush type: Water    Water flush frequency: Every 6 hours    RD to adjust diet per protocol? Yes        07/13/20 1606              TF currently running at 75 ml/hr with a goal of 75 ml/hr   Formula: Jevity 1 5      Active Medications  Scheduled Meds:    Current Facility-Administered Medications:  acetaminophen 650 mg Oral Q4H PRN FAINA Landeros    amiodarone 200 mg Oral Daily With Breakfast FAINA Landeros    aspirin 81 mg Oral Daily Savannah Fernandez, FAINA    chlorhexidine 15 mL Swish & Spit Q12H [address] FAINA Landeros    cholecalciferol 1,000 Units Oral Daily FAINA Landeros    cholestyramine sugar free 4 g Oral BID With Meals FAINA Landeros    escitalopram 10 mg Per J Tube Daily FAINA Landeros    ezetimibe 10 mg Per J Tube Daily FAINA Landeros    guaiFENesin 400 mg Oral Q4H Sabrina Bautista MD    hydrALAZINE 5 mg Intravenous Q6H PRN Barbara Conde MD    insulin glargine 10 Units Subcutaneous HS Cory Nolasco PA-C    insulin lispro 2-12 Units Subcutaneous Q6H Marylee Barrier, MD    Labetalol HCl 10 mg Intravenous Q6H PRN Barbara Conde MD    levalbuterol 1 25 mg Nebulization TID Sabrina Bautista MD    lisinopril 10 mg Per J Tube Daily Sherwood PutMICHAEL delarosa    loperamide 2 mg Per Genaro Schanz Tube Once Sherwood PutMICHAEL delarosa    loperamide 2 mg Per J Tube TID Portia Chery PA-C    melatonin 6 mg Oral HS Sherwood PuttMICHAEL    mirtazapine 15 mg Oral HS Treecekim Shane PA-C    multivitamin-minerals 1 tablet Oral Daily FAINA Landeros    omeprazole (PRILOSEC) suspension 2 mg/mL 20 mg Oral Daily FAINA Landeros    piperacillin-tazobactam 3 375 g Intravenous Q8H Elisa Dominguez MD Last Rate: 3 375 g (07/15/20 3277)   senna 1 tablet Oral HS Jake Nolasco PA-C    sodium chloride 4 mL Nebulization TID Sabrina Bautista MD    traZODone 100 mg Oral HS Duy Fontaine MD warfarin 1 mg Oral Daily (warfarin) FAINA Landeros      Continuous Infusions:     PRN Meds:     acetaminophen 650 mg Q4H PRN   hydrALAZINE 5 mg Q6H PRN   Labetalol HCl 10 mg Q6H PRN       Allergies   No Known Allergies  ---------------------------------------------------------------------------------------  Advance Directive and Living Will:      Power of :    POLST:    ---------------------------------------------------------------------------------------  Care Time Delivered:   No Critical Care time spent     Shari Palma PA-C      Portions of the record may have been created with voice recognition software  Occasional wrong word or "sound a like" substitutions may have occurred due to the inherent limitations of voice recognition software    Read the chart carefully and recognize, using context, where substitutions have occurred

## 2023-12-18 NOTE — ED PROVIDER NOTE - PHYSICAL EXAMINATION
Gen:  Well appearing in NAD  Head:  NC/AT  HEENT: pupils perrl,no pharyngeal erythema, uvula midline  Cardiac: S1S2, RRR  Abd: Soft, non tender  Resp: No distress, CTA   musculoskeletal: Right knee swelling extending to mid thigh. Well healed old incision. No ROM due to swelling and pain. No extensor lag.   Skin: warm and dry as visualized, no rashes  Neuro: MOSHER, aao x 4  Psych:alert, cooperative, appropriate mood and affect for situation

## 2023-12-18 NOTE — ED ADULT TRIAGE NOTE - CHIEF COMPLAINT QUOTE
Pt brought to ED in wheelchair s/p trip and fall on rug at home. Pt denies head strike or LOC. Pt c/o severe pain to previously replaced right knee. Swelling and deformity noted. Pt unable to bend leg at this time. Pt on Plavix and 81 mg ASA, did not take meds yet today. Pt denies any other pain at this time.

## 2023-12-20 NOTE — ED POST DISCHARGE NOTE - RESULT SUMMARY
Abnormal XR hip/femur. Pt reports no fever, redness. States he has orthopedic follow up available. Advised to pursue follow up for additional imaging. May return to ED if unable to obtain timely follow up. - Gary Guevara Pa-C

## 2024-01-11 ENCOUNTER — APPOINTMENT (OUTPATIENT)
Dept: ORTHOPEDIC SURGERY | Facility: CLINIC | Age: 84
End: 2024-01-11
Payer: MEDICARE

## 2024-01-11 VITALS
HEART RATE: 94 BPM | HEIGHT: 70 IN | BODY MASS INDEX: 36.08 KG/M2 | WEIGHT: 252 LBS | SYSTOLIC BLOOD PRESSURE: 115 MMHG | DIASTOLIC BLOOD PRESSURE: 68 MMHG

## 2024-01-11 DIAGNOSIS — Z86.79 PERSONAL HISTORY OF OTHER DISEASES OF THE CIRCULATORY SYSTEM: ICD-10-CM

## 2024-01-11 DIAGNOSIS — Z78.9 OTHER SPECIFIED HEALTH STATUS: ICD-10-CM

## 2024-01-11 DIAGNOSIS — T14.8XXA OTHER INJURY OF UNSPECIFIED BODY REGION, INITIAL ENCOUNTER: ICD-10-CM

## 2024-01-11 DIAGNOSIS — Z96.651 PRESENCE OF RIGHT ARTIFICIAL KNEE JOINT: ICD-10-CM

## 2024-01-11 DIAGNOSIS — Z87.39 PERSONAL HISTORY OF OTHER DISEASES OF THE MUSCULOSKELETAL SYSTEM AND CONNECTIVE TISSUE: ICD-10-CM

## 2024-01-11 DIAGNOSIS — Z86.711 PERSONAL HISTORY OF PULMONARY EMBOLISM: ICD-10-CM

## 2024-01-11 DIAGNOSIS — Z87.09 PERSONAL HISTORY OF OTHER DISEASES OF THE RESPIRATORY SYSTEM: ICD-10-CM

## 2024-01-11 PROCEDURE — 99203 OFFICE O/P NEW LOW 30 MIN: CPT

## 2024-01-12 PROBLEM — Z86.79 HISTORY OF HYPERTENSION: Status: RESOLVED | Noted: 2024-01-12 | Resolved: 2024-01-12

## 2024-01-12 PROBLEM — Z87.09 HISTORY OF ASTHMA: Status: RESOLVED | Noted: 2024-01-12 | Resolved: 2024-01-12

## 2024-01-12 PROBLEM — Z78.9 NEVER EXERCISES: Status: ACTIVE | Noted: 2024-01-12

## 2024-01-12 PROBLEM — Z86.711 HISTORY OF PULMONARY EMBOLISM: Status: RESOLVED | Noted: 2024-01-12 | Resolved: 2024-01-12

## 2024-01-12 PROBLEM — Z87.39 HISTORY OF OSTEOPOROSIS: Status: RESOLVED | Noted: 2024-01-12 | Resolved: 2024-01-12

## 2024-01-12 PROBLEM — Z78.9 NON-SMOKER: Status: ACTIVE | Noted: 2024-01-12

## 2024-01-16 NOTE — ADDENDUM
[FreeTextEntry1] : This note was written by Stevie Langston on 01/16/2024, acting as a scribe for Dipak Quigley III, MD

## 2024-01-16 NOTE — DISCUSSION/SUMMARY
[de-identified] : At this time, due to right knee prepatellar and proximal thigh hematoma status post a fall in a patient with total knee arthroplasty, I recommended a compression sleeve, ice, elevation, physical therapy and reassessment in 4-6 weeks.
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
## 2024-01-16 NOTE — CONSULT LETTER
[Dear  ___] : Dear  [unfilled], [Consult Letter:] : I had the pleasure of evaluating your patient, [unfilled]. [Please see my note below.] : Please see my note below. [Consult Closing:] : Thank you very much for allowing me to participate in the care of this patient.  If you have any questions, please do not hesitate to contact me. [Sincerely,] : Sincerely, [FreeTextEntry3] : Dipak Quigley, III, MD CALERO/janice

## 2024-01-16 NOTE — PHYSICAL EXAM
[de-identified] : Left Knee: Range of Motion in Degrees                      Claimant:    Normal:  Flexion Active     120     135-degrees  Flexion Passive   120     135-degrees  Extension Active     0     0-5-degrees  Extension Passive   0     0-5-degrees    Well-healed scar.    Right Knee: Range of Motion in Degrees                      Claimant:    Normal:  Flexion Active      90      135-degrees  Flexion Passive    90     135-degrees  Extension Active   10     0-5-degrees  Extension Passive  10    0-5-degrees    He has a marked prepatellar hematoma with probable rupture of the prepatellar bursa tracking towards his medial thigh.  There are no gross signs of infection.   No gross neurologic or vascular deficits distally.    [de-identified] : Gait and Station:  Ambulating with a slightly antalgic to antalgic gait.  Normal Station.  [de-identified] : Appearance:  Well-developed, well-nourished male in no acute distress.   [de-identified] : Review of radiographs of the right knee show the patient is status post total knee arthroplasty in acceptable position.

## 2024-01-27 PROBLEM — Z96.651 STATUS POST TOTAL RIGHT KNEE REPLACEMENT: Status: ACTIVE | Noted: 2024-01-11

## 2024-01-27 PROBLEM — T14.8XXA HEMATOMA: Status: ACTIVE | Noted: 2024-01-11

## 2024-02-21 NOTE — ED ADULT NURSE NOTE - CAS EDP DISCH TYPE
"Arterial Line Insertion    Performed by: Juliana Goodman MD  Authorized by: Juliana Goodman MD  Consent: The procedure was performed in an emergent situation. Verbal consent obtained. Written consent obtained.  Risks and benefits: risks, benefits and alternatives were discussed  Consent given by: patient  Patient identity confirmed: arm band  Time out: Immediately prior to procedure a \"time out\" was called to verify the correct patient, procedure, equipment, support staff and site/side marked as required.  Preparation: Patient was prepped and draped in the usual sterile fashion.  Indications: hemodynamic monitoring  Orientation:  Right  Location: radial artery  Procedure Details:  Mack's test normal: yes  Needle gauge: 20  Seldinger technique: Seldinger technique used  Number of attempts: 2    Post-procedure:  Post-procedure: dressing applied  Waveform: good waveform  Post-procedure CNS: unchanged  Patient tolerance: patient tolerated the procedure well with no immediate complications  Comments: Ultrasound guidance          "
Home

## 2024-03-15 ENCOUNTER — APPOINTMENT (OUTPATIENT)
Dept: CT IMAGING | Facility: CLINIC | Age: 84
End: 2024-03-15
Payer: MEDICARE

## 2024-03-15 PROBLEM — I71.20 THORACIC AORTIC ANEURYSM WITHOUT RUPTURE: Status: ACTIVE | Noted: 2024-02-26

## 2024-03-15 PROCEDURE — 71275 CT ANGIOGRAPHY CHEST: CPT

## 2024-03-15 RX ORDER — CLOPIDOGREL BISULFATE 75 MG/1
75 TABLET, FILM COATED ORAL DAILY
Qty: 30 | Refills: 3 | Status: ACTIVE | COMMUNITY

## 2024-03-15 RX ORDER — VALSARTAN 40 MG/1
40 TABLET, COATED ORAL DAILY
Refills: 0 | Status: ACTIVE | COMMUNITY

## 2024-03-15 RX ORDER — ATORVASTATIN CALCIUM 20 MG/1
20 TABLET, FILM COATED ORAL
Qty: 1 | Refills: 3 | Status: ACTIVE | COMMUNITY

## 2024-03-15 RX ORDER — SPIRONOLACTONE 25 MG/1
25 TABLET ORAL DAILY
Qty: 7 | Refills: 0 | Status: ACTIVE | COMMUNITY

## 2024-03-15 RX ORDER — CARVEDILOL 12.5 MG/1
12.5 TABLET, FILM COATED ORAL TWICE DAILY
Qty: 120 | Refills: 1 | Status: ACTIVE | COMMUNITY

## 2024-03-20 ENCOUNTER — APPOINTMENT (OUTPATIENT)
Dept: CARDIOTHORACIC SURGERY | Facility: CLINIC | Age: 84
End: 2024-03-20
Payer: MEDICARE

## 2024-03-20 DIAGNOSIS — I10 ESSENTIAL (PRIMARY) HYPERTENSION: ICD-10-CM

## 2024-03-20 DIAGNOSIS — I25.10 ATHEROSCLEROTIC HEART DISEASE OF NATIVE CORONARY ARTERY W/OUT ANGINA PECTORIS: ICD-10-CM

## 2024-03-20 DIAGNOSIS — I50.20 UNSPECIFIED SYSTOLIC (CONGESTIVE) HEART FAILURE: ICD-10-CM

## 2024-03-20 DIAGNOSIS — K21.9 GASTRO-ESOPHAGEAL REFLUX DISEASE W/OUT ESOPHAGITIS: ICD-10-CM

## 2024-03-20 DIAGNOSIS — I71.20 THORACIC AORTIC ANEURYSM, WITHOUT RUPTURE, UNSPECIFIED: ICD-10-CM

## 2024-03-20 PROCEDURE — 99204 OFFICE O/P NEW MOD 45 MIN: CPT

## 2024-03-21 VITALS — RESPIRATION RATE: 16 BRPM | BODY MASS INDEX: 35.79 KG/M2 | WEIGHT: 250 LBS | HEIGHT: 70 IN

## 2024-03-21 PROBLEM — I25.10 CAD (CORONARY ARTERY DISEASE): Status: ACTIVE | Noted: 2024-03-21

## 2024-03-21 PROBLEM — I10 HYPERTENSION: Status: ACTIVE | Noted: 2019-04-16

## 2024-03-21 PROBLEM — I50.20 SYSTOLIC CHF: Status: ACTIVE | Noted: 2024-03-21

## 2024-03-21 PROBLEM — K21.9 GASTROESOPHAGEAL REFLUX DISEASE: Status: ACTIVE | Noted: 2019-04-16

## 2024-03-21 RX ORDER — ASPIRIN ENTERIC COATED TABLETS 81 MG 81 MG/1
81 TABLET, DELAYED RELEASE ORAL DAILY
Qty: 60 | Refills: 0 | Status: DISCONTINUED | COMMUNITY
End: 2024-03-21

## 2024-03-21 RX ORDER — VALSARTAN 40 MG/1
40 TABLET ORAL DAILY
Qty: 90 | Refills: 0 | Status: DISCONTINUED | COMMUNITY
End: 2024-03-21

## 2024-03-21 RX ORDER — PANTOPRAZOLE 40 MG/1
40 TABLET, DELAYED RELEASE ORAL DAILY
Qty: 7 | Refills: 0 | Status: DISCONTINUED | COMMUNITY
End: 2024-03-21

## 2024-03-21 NOTE — PHYSICAL EXAM
[General Appearance - Alert] : alert [General Appearance - In No Acute Distress] : in no acute distress [Sclera] : the sclera and conjunctiva were normal [PERRL With Normal Accommodation] : pupils were equal in size, round, and reactive to light [Outer Ear] : the ears and nose were normal in appearance [Neck Appearance] : the appearance of the neck was normal [] : no respiratory distress [Auscultation Breath Sounds / Voice Sounds] : lungs were clear to auscultation bilaterally [Heart Rate And Rhythm] : heart rate was normal and rhythm regular [Heart Sounds Gallop] : no gallops [Heart Sounds] : normal S1 and S2 [Murmurs] : no murmurs [Heart Sounds Pericardial Friction Rub] : no pericardial rub [2+] : left 2+ [Bowel Sounds] : normal bowel sounds [Abdomen Soft] : soft [Abnormal Walk] : normal gait [Skin Color & Pigmentation] : normal skin color and pigmentation [Cranial Nerves] : cranial nerves 2-12 were intact [Oriented To Time, Place, And Person] : oriented to person, place, and time [FreeTextEntry2] : No LE edema  [FreeTextEntry1] : Deferred

## 2024-03-21 NOTE — PROCEDURE
[FreeTextEntry1] :  Dr. Perez reviewed the indications for surgery, and used our webpage www.heartprocedures.org <[email or web address].heartprocedures.org> to illustrate the aorta and anatomy of the heart. Those indications are the following: size greater than 5.0 cm, symptomatic aneurysms, family history of aortic dissection or aneurysm death with a size greater than 4.5 cm, other necessary cardiac procedures such as coronary artery bypass grafting or valvular disorders with an aneurysm greater than 4.5 cm, or connective tissue disorders with an aneurysm size greater than 4.5 cm. The patient does not meet size criteria for surgical intervention at the time. Patient was advised to view the educational video prior to this visit regarding aortic pathology, risk factors, surgical procedures, and lifestyle modifications. Video can be retrieved at <[email or web address].[email or web address]  Dr. Perez discussed activity restrictions with the patient, and would advise exercise at a moderate amount with no heavy lifting over one third of body weight, and avoiding heart rates that exceed 140 beats per minute. In addition, every patient should abstain from tobacco abuse and to avoid all illicit drug use, especially stimulants such as cocaine or methamphetamine. Dr. Perez also counseled regarding maintaining a healthy heart diet, and losing any excessive weight as this also put undue stress on both the aorta and entire cardiovascular system. First degree family members should be screened for bicuspid valve disease, and ascending aortic aneurysms.
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
## 2024-03-21 NOTE — DATA REVIEWED
[FreeTextEntry1] :  3/15/24 CTA Chest revealed 4.8 cm ascending aorta, unchanged when prior measured in a comparable manner.  1/8/24 CT ABD/PELVIS revealed No suspicious masses detected within the abdomen and pelvis. No evidence of an abdominal aortic aneurysm.   3/7/24 TTE revealed EF 30%, E/A flow reversal noted. Suggestive of reduced compliance of the LT ventricle. Moderately dilated LT atrium. Mild LV enlargement and mild concentric hypertrophy. Abnormal septal motion consistent with LBBB. Moderate global hypokinesis. Mild to moderate TR. Mild MR.   12/2/22 Non cont CT Chest revealed atherosclerotic calcification within the thoracic aorta and coronary arteries. Aneurysmal dilatation of the ascending thoracic aorta measuring up to 4.8 cm diameter stable. No evidence of pericardial effusion.

## 2024-03-21 NOTE — ASSESSMENT
[FreeTextEntry1] : 83 year old M former smoker with PMHx of HTN, HLD, CAD (s/p PCI 2013 @ [facility] c/b cardiac arrest), chronic combined systolic/ diastolic CHF (EF 30% by TTE 3/7/24), Asthma, Osteoporosis, R thigh hematoma 2/2 trauma (12/2023) and known Thoracic aortic aneurysm since 2009 who presents for initial evaluation and management for aortic aneurysm.   I have reviewed the patient's medical records, diagnostic images during the time of this office consultation and have made the following recommendation. CT and TTE were completed of the thoracic aorta. The report was reviewed and noted in the chart, I independently reviewed and interpreted images and report and I reviewed the films/CD and agree.  Review of the imaging shows his aortic pathology has remained stable and does not require surgical intervention.  - Follow up in [facility] with Dr. Rob in 1 yr with CTA chest.  - Continue medication regimen. - Follow up with cardiologist Dr. Cassidy and PCP. - Blood pressure management. - Discussed signs and symptoms that warrant emergency medical attention.

## 2024-03-21 NOTE — CONSULT LETTER
[Dear  ___] : Dear  [unfilled], [Courtesy Letter:] : I had the pleasure of seeing your patient, [unfilled], in my office today. [Consult Closing:] : Thank you very much for allowing me to participate in the care of this patient.  If you have any questions, please do not hesitate to contact me. [Please see my note below.] : Please see my note below. [Sincerely,] : Sincerely, [FreeTextEntry2] : Dr Lei Cassidy [FreeTextEntry1] : Please find attached our consultation on your patient, Mr. HUNTER  We take a multidisciplinary team approach to patient care and consider you, the referring physician, an extension of our team. We will maintain an open line of communication with you throughout your patient's treatment course.  It is our commitment to provide your patient with the highest quality of advanced therapeutic options. We thank you for allowing us to participate in the care of your patient. Please do not hesitate to contact our team with any questions or concerns at [phone].    Thank you for allowing us to participate in this patients care. [FreeTextEntry3] : Moses Perez M.D. Professor of Cardiovascular and Thoracic Surgery Minimally Invasive Valve Surgeon Director of Aortic Surgery, [facility] Cell: [phone] Email: [email or web address]

## 2024-03-21 NOTE — END OF VISIT
[Time Spent: ___ minutes] : I have spent [unfilled] minutes of time on the encounter. [FreeTextEntry3] : I, BENI Ragland , personally performed the evaluation and management (E/M) services for this new patient.  That E/M includes conducting the clinically appropriate initial history &/or exam, assessing all conditions, and establishing the plan of care.  Today, my BEATRICE, was here to observe &/or participate in the visit & follow plan of care established by me.

## 2024-05-09 ENCOUNTER — TRANSCRIPTION ENCOUNTER (OUTPATIENT)
Age: 84
End: 2024-05-09

## 2024-05-09 ENCOUNTER — OUTPATIENT (OUTPATIENT)
Dept: INPATIENT UNIT | Facility: HOSPITAL | Age: 84
LOS: 1 days | End: 2024-05-09
Payer: MEDICARE

## 2024-05-09 VITALS
RESPIRATION RATE: 19 BRPM | DIASTOLIC BLOOD PRESSURE: 86 MMHG | HEART RATE: 71 BPM | TEMPERATURE: 98 F | OXYGEN SATURATION: 96 % | HEIGHT: 70 IN | SYSTOLIC BLOOD PRESSURE: 152 MMHG | WEIGHT: 244.93 LBS

## 2024-05-09 VITALS
HEART RATE: 97 BPM | DIASTOLIC BLOOD PRESSURE: 69 MMHG | SYSTOLIC BLOOD PRESSURE: 114 MMHG | TEMPERATURE: 98 F | OXYGEN SATURATION: 96 % | RESPIRATION RATE: 17 BRPM

## 2024-05-09 DIAGNOSIS — Z98.890 OTHER SPECIFIED POSTPROCEDURAL STATES: Chronic | ICD-10-CM

## 2024-05-09 DIAGNOSIS — Z96.653 PRESENCE OF ARTIFICIAL KNEE JOINT, BILATERAL: Chronic | ICD-10-CM

## 2024-05-09 DIAGNOSIS — Z98.49 CATARACT EXTRACTION STATUS, UNSPECIFIED EYE: Chronic | ICD-10-CM

## 2024-05-09 DIAGNOSIS — Z90.89 ACQUIRED ABSENCE OF OTHER ORGANS: Chronic | ICD-10-CM

## 2024-05-09 DIAGNOSIS — Z98.61 CORONARY ANGIOPLASTY STATUS: Chronic | ICD-10-CM

## 2024-05-09 DIAGNOSIS — I42.8 OTHER CARDIOMYOPATHIES: ICD-10-CM

## 2024-05-09 DIAGNOSIS — Z96.643 PRESENCE OF ARTIFICIAL HIP JOINT, BILATERAL: Chronic | ICD-10-CM

## 2024-05-09 LAB
ANION GAP SERPL CALC-SCNC: 11 MMOL/L — SIGNIFICANT CHANGE UP (ref 5–17)
BUN SERPL-MCNC: 15 MG/DL — SIGNIFICANT CHANGE UP (ref 7–23)
CALCIUM SERPL-MCNC: 10.1 MG/DL — SIGNIFICANT CHANGE UP (ref 8.4–10.5)
CHLORIDE SERPL-SCNC: 106 MMOL/L — SIGNIFICANT CHANGE UP (ref 96–108)
CO2 SERPL-SCNC: 24 MMOL/L — SIGNIFICANT CHANGE UP (ref 22–31)
CREAT SERPL-MCNC: 1.05 MG/DL — SIGNIFICANT CHANGE UP (ref 0.5–1.3)
EGFR: 70 ML/MIN/1.73M2 — SIGNIFICANT CHANGE UP
GLUCOSE SERPL-MCNC: 118 MG/DL — HIGH (ref 70–99)
HCT VFR BLD CALC: 46.2 % — SIGNIFICANT CHANGE UP (ref 39–50)
HGB BLD-MCNC: 15.3 G/DL — SIGNIFICANT CHANGE UP (ref 13–17)
MAGNESIUM SERPL-MCNC: 1.8 MG/DL — SIGNIFICANT CHANGE UP (ref 1.6–2.6)
MCHC RBC-ENTMCNC: 31.9 PG — SIGNIFICANT CHANGE UP (ref 27–34)
MCHC RBC-ENTMCNC: 33.1 GM/DL — SIGNIFICANT CHANGE UP (ref 32–36)
MCV RBC AUTO: 96.3 FL — SIGNIFICANT CHANGE UP (ref 80–100)
NRBC # BLD: 0 /100 WBCS — SIGNIFICANT CHANGE UP (ref 0–0)
PLATELET # BLD AUTO: 243 K/UL — SIGNIFICANT CHANGE UP (ref 150–400)
POTASSIUM SERPL-MCNC: 4 MMOL/L — SIGNIFICANT CHANGE UP (ref 3.5–5.3)
POTASSIUM SERPL-SCNC: 4 MMOL/L — SIGNIFICANT CHANGE UP (ref 3.5–5.3)
RBC # BLD: 4.8 M/UL — SIGNIFICANT CHANGE UP (ref 4.2–5.8)
RBC # FLD: 12.8 % — SIGNIFICANT CHANGE UP (ref 10.3–14.5)
SODIUM SERPL-SCNC: 141 MMOL/L — SIGNIFICANT CHANGE UP (ref 135–145)
WBC # BLD: 11.39 K/UL — HIGH (ref 3.8–10.5)
WBC # FLD AUTO: 11.39 K/UL — HIGH (ref 3.8–10.5)

## 2024-05-09 PROCEDURE — 71045 X-RAY EXAM CHEST 1 VIEW: CPT | Mod: 26,XE

## 2024-05-09 PROCEDURE — 93010 ELECTROCARDIOGRAM REPORT: CPT

## 2024-05-09 PROCEDURE — 71046 X-RAY EXAM CHEST 2 VIEWS: CPT | Mod: 26

## 2024-05-09 RX ORDER — SPIRONOLACTONE 25 MG/1
1 TABLET, FILM COATED ORAL
Qty: 0 | Refills: 0 | DISCHARGE

## 2024-05-09 RX ORDER — ASPIRIN/CALCIUM CARB/MAGNESIUM 324 MG
1 TABLET ORAL
Qty: 0 | Refills: 0 | DISCHARGE

## 2024-05-09 RX ORDER — OXYCODONE HYDROCHLORIDE 5 MG/1
5 TABLET ORAL EVERY 8 HOURS
Refills: 0 | Status: DISCONTINUED | OUTPATIENT
Start: 2024-05-09 | End: 2024-05-09

## 2024-05-09 RX ORDER — CHOLECALCIFEROL (VITAMIN D3) 125 MCG
1 CAPSULE ORAL
Qty: 0 | Refills: 0 | DISCHARGE

## 2024-05-09 RX ORDER — OXYCODONE HYDROCHLORIDE 5 MG/1
1 TABLET ORAL
Qty: 9 | Refills: 0
Start: 2024-05-09 | End: 2024-05-11

## 2024-05-09 RX ORDER — CARVEDILOL PHOSPHATE 80 MG/1
1 CAPSULE, EXTENDED RELEASE ORAL
Qty: 0 | Refills: 0 | DISCHARGE

## 2024-05-09 RX ORDER — CLOPIDOGREL BISULFATE 75 MG/1
1 TABLET, FILM COATED ORAL
Qty: 0 | Refills: 0 | DISCHARGE

## 2024-05-09 RX ORDER — ATORVASTATIN CALCIUM 80 MG/1
1 TABLET, FILM COATED ORAL
Qty: 0 | Refills: 0 | DISCHARGE

## 2024-05-09 RX ORDER — CEPHALEXIN 500 MG
1 CAPSULE ORAL
Qty: 8 | Refills: 0
Start: 2024-05-09 | End: 2024-05-10

## 2024-05-09 RX ORDER — CEPHALEXIN 500 MG
250 CAPSULE ORAL ONCE
Refills: 0 | Status: DISCONTINUED | OUTPATIENT
Start: 2024-05-09 | End: 2024-05-09

## 2024-05-09 RX ORDER — VALSARTAN 80 MG/1
1 TABLET ORAL
Qty: 0 | Refills: 0 | DISCHARGE

## 2024-05-09 RX ORDER — CEFAZOLIN SODIUM 1 G
2000 VIAL (EA) INJECTION ONCE
Refills: 0 | Status: COMPLETED | OUTPATIENT
Start: 2024-05-09 | End: 2024-05-09

## 2024-05-09 RX ADMIN — OXYCODONE HYDROCHLORIDE 5 MILLIGRAM(S): 5 TABLET ORAL at 19:41

## 2024-05-09 NOTE — ASU DISCHARGE PLAN (ADULT/PEDIATRIC) - ASU DC SPECIAL INSTRUCTIONSFT
Please see the PRE PRINTED DISCHARGE SHEET given to you by your nurse    START KEFLEX AT 11p ashlee PANCHAL'S OFFICE WILL CALL TO SET UP FOLLOW UP APPOINTMENT Please see the PRE PRINTED DISCHARGE SHEET given to you by your nurse    START KEFLEX AT 11p ashlee PANCHAL'S OFFICE WILL CALL TO SET UP FOLLOW UP APPOINTMENT    POCKET PRESS TO BE REMOVED AT HOME IN THE MORNING

## 2024-05-09 NOTE — PRE-ANESTHESIA EVALUATION ADULT - LAST ECHOCARDIOGRAM
3/7/24 EF30%, Nl RVF, mild-mod TR, mild MR, mild AI
3/7/24 EF30%, Nl RVF, mild-mod TR, mild MR, mild AI

## 2024-05-09 NOTE — H&P CARDIOLOGY - HISTORY OF PRESENT ILLNESS
82 y/o male with a PMHx of Afib, CAD s/p stents, GERD, HTN, hypercholesterolemia, obesity, osteoarthritis, thoracic aortic aneurysm, h/o right knee surgery present  85 y/o male with a PMHx of CAD s/p VINOD to RCA 9/9/2013, CKD, ascending aortic aneurysm (5cm), GERD, HTN, hypercholesterolemia, obesity, osteoarthritis, and NICM presents for ICD Implant. Recent TTE EF 30% f/b Dr. Cassidy. Patient is unable to take entesto or SGLT2 inhibitors due to cost. On GMT with valsartan, carvedilol and spironolactone. Patient with c/o WILBURN after 1 blocks and walking up stairs. Patient with NYHA class II HF 2/2 NICM. Seen and evaluated by Dr. Odonnell, presents for ICD Implant.

## 2024-05-09 NOTE — PATIENT PROFILE ADULT - FALL HARM RISK - RISK INTERVENTIONS
Assistance OOB with selected safe patient handling equipment/Assistance with ambulation/Communicate Fall Risk and Risk Factors to all staff, patient, and family/Discuss with provider need for PT consult/Monitor gait and stability/Reinforce activity limits and safety measures with patient and family/Visual Cue: Yellow wristband/Bed in lowest position, wheels locked, appropriate side rails in place/Call bell, personal items and telephone in reach/Instruct patient to call for assistance before getting out of bed or chair/Non-slip footwear when patient is out of bed/[place] to call system/Physically safe environment - no spills, clutter or unnecessary equipment/Purposeful Proactive Rounding/Room/bathroom lighting operational, light cord in reach

## 2024-05-09 NOTE — PRE-ANESTHESIA EVALUATION ADULT - NSANTHPMHFT_GEN_ALL_CORE
83 y/o male with a PMHx of CAD s/p VINOD to RCA 9/9/2013, CKD, ascending aortic aneurysm (4.8cm), GERD, HTN, hypercholesterolemia, obesity, osteoarthritis, and NICM presents for ICD Implant

## 2024-05-09 NOTE — H&P CARDIOLOGY - NSICDXPASTMEDICALHX_GEN_ALL_CORE_FT
PAST MEDICAL HISTORY:  AF (atrial fibrillation)     Aortic aneurysm, thoracic     CAD (coronary artery disease)     GERD (gastroesophageal reflux disease)     HTN (hypertension)     Hypercholesterolemia     NICM (nonischemic cardiomyopathy)     Obesity     Osteoarthritis hip    Stented coronary artery PTCA x 1 stent -2013

## 2024-05-09 NOTE — PRE-OP CHECKLIST - NSWEIGHTCALCTOOLDRUG_GEN_A_CORE
Requesting Metformin ER  LOV: 8/6/19  RTC: not noted  Last Relevant Labs: 6/17/19  Filled: 6/18/19 #30 with 1 refills    Future Appointments   Date Time Provider [place]   9/9/2019  2:00 PM Kyle Vogt L[facility]     Do you want
 used

## 2024-05-09 NOTE — ASU DISCHARGE PLAN (ADULT/PEDIATRIC) - CARE PROVIDER_API CALL
Raphael Odonnell  Cardiac Electrophysiology  [address] [facility] [facility], [address]  [place] [address]  Phone: [phone]  Follow Up Time: Routine

## 2024-05-10 PROCEDURE — C1889: CPT

## 2024-05-10 PROCEDURE — 33249 INSJ/RPLCMT DEFIB W/LEAD(S): CPT

## 2024-05-10 PROCEDURE — 83735 ASSAY OF MAGNESIUM: CPT

## 2024-05-10 PROCEDURE — 36415 COLL VENOUS BLD VENIPUNCTURE: CPT

## 2024-05-10 PROCEDURE — 93005 ELECTROCARDIOGRAM TRACING: CPT

## 2024-05-10 PROCEDURE — C1900: CPT

## 2024-05-10 PROCEDURE — C1887: CPT

## 2024-05-10 PROCEDURE — 71046 X-RAY EXAM CHEST 2 VIEWS: CPT

## 2024-05-10 PROCEDURE — 80048 BASIC METABOLIC PNL TOTAL CA: CPT

## 2024-05-10 PROCEDURE — C1769: CPT

## 2024-05-10 PROCEDURE — C1730: CPT

## 2024-05-10 PROCEDURE — 71045 X-RAY EXAM CHEST 1 VIEW: CPT

## 2024-05-10 PROCEDURE — 86850 RBC ANTIBODY SCREEN: CPT

## 2024-05-10 PROCEDURE — 85027 COMPLETE CBC AUTOMATED: CPT

## 2024-05-10 PROCEDURE — C1892: CPT

## 2024-05-10 PROCEDURE — C1898: CPT

## 2024-05-10 PROCEDURE — C1722: CPT

## 2024-05-10 PROCEDURE — 86901 BLOOD TYPING SEROLOGIC RH(D): CPT

## 2024-05-10 PROCEDURE — C1777: CPT

## 2024-05-10 PROCEDURE — 86900 BLOOD TYPING SEROLOGIC ABO: CPT

## 2024-05-12 ENCOUNTER — TRANSCRIPTION ENCOUNTER (OUTPATIENT)
Age: 84
End: 2024-05-12

## 2024-10-01 NOTE — PATIENT PROFILE ADULT - NSPROPTRIGHTCAREGIVER_GEN_A_NUR
1511- Arrived to [facility] and stretch. Assisted to standing position, pt feels pulling in neck. Discussed positioning during surgery and different muscle use during positioning. Remains sitting at bedside. Offered additional warm packs, ice, or warm blanket to neck for comfort, declines all. Emotional support provided. Pt discharge ride to arrive at 5 pm or pt can wait with daughter on another unit. Call light within reach. Rx To Go updated on pt status.     1535- Home going medication to bedside. Pt with slow regression of neck pain, tightness. Encouraged pt to keep moving and stretching, took new prescription Norco at bedside.     1545- Pt repositioned in side lying position with bed flat for comfort. Call light within reach.    1615- Eyes closed, sleeping, resp even and unlabored. Call light within reach    1630- Awake, pain in neck almost completely relieved at this time in side lying position after nap. Repsotioned to . Pain starting to come back. VSS. Reviewed discharge instructions, questions answered. Pt states he will not have a responsible adult at home with home after discharge. Encouraged to call friend or family to stay with him. Reviewed signs to call 911. IV discontinued. Pt dressed self. Awaiting discharge ride home.   1704- Pt son in law came from another unit to get pt in w/c. Pt requests to wait with family on another unit until discharge ride is here. Reviewed with family pt is to have minimal activity rest of the day not ambulate through parking lot. Both verbalized understanding.   
no

## 2024-10-20 ENCOUNTER — OFFICE (OUTPATIENT)
Dept: URBAN - METROPOLITAN AREA CLINIC 12 | Facility: CLINIC | Age: 84
Setting detail: OPHTHALMOLOGY
End: 2024-10-20
Payer: MEDICARE

## 2024-10-20 DIAGNOSIS — H35.363: ICD-10-CM

## 2024-10-20 DIAGNOSIS — H35.373: ICD-10-CM

## 2024-10-20 DIAGNOSIS — H26.493: ICD-10-CM

## 2024-10-20 PROCEDURE — 92250 FUNDUS PHOTOGRAPHY W/I&R: CPT | Performed by: OPTOMETRIST

## 2024-10-20 PROCEDURE — 92004 COMPRE OPH EXAM NEW PT 1/>: CPT | Performed by: OPTOMETRIST

## 2024-10-20 ASSESSMENT — REFRACTION_MANIFEST
OD_AXIS: 100
OS_AXIS: 070
OD_VA1: 20/25
OS_CYLINDER: -1.75
OD_SPHERE: +2.25
OD_CYLINDER: -1.00
OS_VA1: 20/60
OS_SPHERE: +2.50

## 2024-10-20 ASSESSMENT — TONOMETRY
OD_IOP_MMHG: 11
OS_IOP_MMHG: 13

## 2024-10-20 ASSESSMENT — KERATOMETRY
OD_K2POWER_DIOPTERS: 43.50
OD_K1POWER_DIOPTERS: 41.75
OD_AXISANGLE_DEGREES: 003
OS_K1POWER_DIOPTERS: 42.00
OS_AXISANGLE_DEGREES: 163
OS_K2POWER_DIOPTERS: 43.25
METHOD_AUTO_MANUAL: AUTO

## 2024-10-20 ASSESSMENT — REFRACTION_CURRENTRX
OD_CYLINDER: -1.00
OS_AXIS: 110
OS_OVR_VA: 20/
OD_AXIS: 070
OS_VPRISM_DIRECTION: PROGS
OD_SPHERE: +3.50
OS_CYLINDER: -0.50
OD_VPRISM_DIRECTION: PROGS
OD_OVR_VA: 20/
OS_SPHERE: +3.50

## 2024-10-20 ASSESSMENT — VISUAL ACUITY
OS_BCVA: 20/50-
OD_BCVA: 20/70-

## 2024-10-20 ASSESSMENT — REFRACTION_AUTOREFRACTION
OD_SPHERE: +1.25
OS_CYLINDER: -2.25
OD_CYLINDER: -1.75
OD_AXIS: 090
OS_AXIS: 078
OS_SPHERE: +1.75

## 2024-10-20 ASSESSMENT — CORNEAL EDEMA - MICROCYSTIC EPITHELIAL EDEMA (MCE): OD_MCE: ABSENT

## 2024-10-20 ASSESSMENT — CONFRONTATIONAL VISUAL FIELD TEST (CVF)
OD_FINDINGS: FULL
OS_FINDINGS: FULL

## 2024-11-03 ENCOUNTER — OFFICE (OUTPATIENT)
Dept: URBAN - METROPOLITAN AREA CLINIC 12 | Facility: CLINIC | Age: 84
Setting detail: OPHTHALMOLOGY
End: 2024-11-03
Payer: MEDICARE

## 2024-11-03 DIAGNOSIS — H35.363: ICD-10-CM

## 2024-11-03 DIAGNOSIS — H26.493: ICD-10-CM

## 2024-11-03 DIAGNOSIS — Z96.1: ICD-10-CM

## 2024-11-03 DIAGNOSIS — H35.342: ICD-10-CM

## 2024-11-03 DIAGNOSIS — H35.373: ICD-10-CM

## 2024-11-03 PROCEDURE — 92250 FUNDUS PHOTOGRAPHY W/I&R: CPT | Performed by: OPHTHALMOLOGY

## 2024-11-03 PROCEDURE — 99214 OFFICE O/P EST MOD 30 MIN: CPT | Performed by: OPHTHALMOLOGY

## 2024-11-03 ASSESSMENT — KERATOMETRY
OS_K1POWER_DIOPTERS: 42.25
OD_K2POWER_DIOPTERS: 43.25
OD_K1POWER_DIOPTERS: 41.75
OS_K2POWER_DIOPTERS: 43.25
OS_AXISANGLE_DEGREES: 164
OD_AXISANGLE_DEGREES: 001
METHOD_AUTO_MANUAL: AUTO

## 2024-11-03 ASSESSMENT — REFRACTION_MANIFEST
OS_SPHERE: +1.25
OD_AXIS: 091
OD_CYLINDER: -1.75
OD_VA1: 20/40
OD_SPHERE: +1.00
OS_VA1: 20/50
OS_CYLINDER: -2.00
OS_AXIS: 079

## 2024-11-03 ASSESSMENT — TONOMETRY
OS_IOP_MMHG: 12
OD_IOP_MMHG: 10

## 2024-11-03 ASSESSMENT — REFRACTION_CURRENTRX
OD_CYLINDER: -1.50
OS_SPHERE: +2.75
OS_AXIS: 068
OD_SPHERE: +3.50
OD_AXIS: 098
OD_SPHERE: +3.25
OD_VPRISM_DIRECTION: PROGS
OS_OVR_VA: 20/
OS_OVR_VA: 20/
OD_CYLINDER: -1.00
OS_AXIS: 110
OS_SPHERE: +3.50
OD_OVR_VA: 20/
OS_CYLINDER: -0.50
OS_VPRISM_DIRECTION: PROGS
OS_CYLINDER: -1.00
OD_OVR_VA: 20/
OD_AXIS: 070

## 2024-11-03 ASSESSMENT — REFRACTION_AUTOREFRACTION
OD_CYLINDER: -1.75
OS_CYLINDER: -2.00
OS_SPHERE: +1.25
OD_SPHERE: +1.00
OD_AXIS: 091
OS_AXIS: 079

## 2024-11-03 ASSESSMENT — VISUAL ACUITY
OS_BCVA: 20/70-1
OD_BCVA: 20/70-1

## 2024-11-03 ASSESSMENT — CORNEAL EDEMA - MICROCYSTIC EPITHELIAL EDEMA (MCE): OD_MCE: ABSENT

## 2024-11-22 ENCOUNTER — ASC (OUTPATIENT)
Dept: URBAN - METROPOLITAN AREA SURGERY 8 | Facility: SURGERY | Age: 84
Setting detail: OPHTHALMOLOGY
End: 2024-11-22
Payer: MEDICARE

## 2024-11-22 ENCOUNTER — RX ONLY (RX ONLY)
Age: 84
End: 2024-11-22

## 2024-11-22 DIAGNOSIS — H26.491: ICD-10-CM

## 2024-11-22 PROCEDURE — 66821 AFTER CATARACT LASER SURGERY: CPT | Mod: RT | Performed by: OPHTHALMOLOGY

## 2024-11-22 ASSESSMENT — REFRACTION_CURRENTRX
OD_AXIS: 098
OD_VPRISM_DIRECTION: PROGS
OS_OVR_VA: 20/
OS_SPHERE: +3.50
OS_OVR_VA: 20/
OD_OVR_VA: 20/
OD_CYLINDER: -1.00
OD_SPHERE: +3.25
OD_CYLINDER: -1.50
OS_AXIS: 068
OS_VPRISM_DIRECTION: PROGS
OS_AXIS: 110
OS_CYLINDER: -0.50
OD_OVR_VA: 20/
OD_AXIS: 070
OS_CYLINDER: -1.00
OS_SPHERE: +2.75
OD_SPHERE: +3.50

## 2024-11-22 ASSESSMENT — VISUAL ACUITY
OS_BCVA: 20/70-1
OD_BCVA: 20/70-1

## 2024-11-22 ASSESSMENT — REFRACTION_AUTOREFRACTION
OD_CYLINDER: -1.75
OS_CYLINDER: -2.00
OS_SPHERE: +1.25
OD_SPHERE: +1.00
OS_AXIS: 079
OD_AXIS: 091

## 2024-11-22 ASSESSMENT — REFRACTION_MANIFEST
OD_VA1: 20/40
OD_CYLINDER: -1.75
OS_VA1: 20/50
OS_CYLINDER: -2.00
OD_SPHERE: +1.00
OS_AXIS: 079
OS_SPHERE: +1.25
OD_AXIS: 091

## 2024-11-22 ASSESSMENT — KERATOMETRY
OD_K2POWER_DIOPTERS: 43.25
OS_AXISANGLE_DEGREES: 164
OS_K1POWER_DIOPTERS: 42.25
OD_K1POWER_DIOPTERS: 41.75
METHOD_AUTO_MANUAL: AUTO
OD_AXISANGLE_DEGREES: 001
OS_K2POWER_DIOPTERS: 43.25

## 2024-11-22 ASSESSMENT — CONFRONTATIONAL VISUAL FIELD TEST (CVF)
OD_FINDINGS: FULL
OS_FINDINGS: FULL

## 2024-11-22 ASSESSMENT — CORNEAL EDEMA - MICROCYSTIC EPITHELIAL EDEMA (MCE): OD_MCE: ABSENT

## 2024-12-06 ENCOUNTER — RX ONLY (RX ONLY)
Age: 84
End: 2024-12-06

## 2024-12-06 ENCOUNTER — AMBULATORY SURGERY (OUTPATIENT)
Dept: URBAN - METROPOLITAN AREA SURGERY 2 | Facility: SURGERY | Age: 84
Setting detail: OPHTHALMOLOGY
End: 2024-12-06
Payer: MEDICARE

## 2024-12-06 DIAGNOSIS — H26.492: ICD-10-CM

## 2024-12-06 PROCEDURE — 66821 AFTER CATARACT LASER SURGERY: CPT | Mod: LT | Performed by: OPHTHALMOLOGY

## 2024-12-06 ASSESSMENT — REFRACTION_CURRENTRX
OD_AXIS: 098
OS_AXIS: 068
OS_CYLINDER: -0.50
OD_SPHERE: +3.25
OD_CYLINDER: -1.00
OD_OVR_VA: 20/
OD_AXIS: 070
OS_AXIS: 110
OS_SPHERE: +3.50
OS_VPRISM_DIRECTION: PROGS
OD_OVR_VA: 20/
OD_VPRISM_DIRECTION: PROGS
OD_CYLINDER: -1.50
OS_OVR_VA: 20/
OD_SPHERE: +3.50
OS_SPHERE: +2.75
OS_CYLINDER: -1.00
OS_OVR_VA: 20/

## 2024-12-06 ASSESSMENT — KERATOMETRY
OD_K2POWER_DIOPTERS: 43.25
OD_K1POWER_DIOPTERS: 41.75
OS_K1POWER_DIOPTERS: 42.25
OD_AXISANGLE_DEGREES: 001
OS_K2POWER_DIOPTERS: 43.25
METHOD_AUTO_MANUAL: AUTO
OS_AXISANGLE_DEGREES: 164

## 2024-12-06 ASSESSMENT — VISUAL ACUITY
OS_BCVA: 20/70-1
OD_BCVA: 20/70-1

## 2024-12-06 ASSESSMENT — REFRACTION_MANIFEST
OS_AXIS: 079
OD_VA1: 20/40
OD_CYLINDER: -1.75
OS_CYLINDER: -2.00
OS_VA1: 20/50
OS_SPHERE: +1.25
OD_AXIS: 091
OD_SPHERE: +1.00

## 2024-12-06 ASSESSMENT — CORNEAL EDEMA - MICROCYSTIC EPITHELIAL EDEMA (MCE): OD_MCE: ABSENT

## 2024-12-06 ASSESSMENT — REFRACTION_AUTOREFRACTION
OD_CYLINDER: -1.75
OD_AXIS: 091
OS_CYLINDER: -2.00
OS_AXIS: 079
OS_SPHERE: +1.25
OD_SPHERE: +1.00

## 2024-12-20 ENCOUNTER — OFFICE (OUTPATIENT)
Dept: URBAN - METROPOLITAN AREA CLINIC 12 | Facility: CLINIC | Age: 84
Setting detail: OPHTHALMOLOGY
End: 2024-12-20
Payer: MEDICARE

## 2024-12-20 DIAGNOSIS — H43.392: ICD-10-CM

## 2024-12-20 DIAGNOSIS — H26.492: ICD-10-CM

## 2024-12-20 PROBLEM — H35.372 EPIRETINAL MEMBRANE ;  , LEFT EYE: Status: ACTIVE | Noted: 2024-12-20

## 2024-12-20 PROCEDURE — 99024 POSTOP FOLLOW-UP VISIT: CPT | Performed by: OPTOMETRIST

## 2024-12-20 ASSESSMENT — REFRACTION_CURRENTRX
OD_VPRISM_DIRECTION: PROGS
OD_SPHERE: +3.50
OS_CYLINDER: -1.00
OS_CYLINDER: -0.50
OS_VPRISM_DIRECTION: PROGS
OS_SPHERE: +3.50
OD_SPHERE: +3.25
OD_AXIS: 098
OD_OVR_VA: 20/
OD_CYLINDER: -1.50
OS_SPHERE: +2.75
OS_AXIS: 068
OD_CYLINDER: -1.00
OS_OVR_VA: 20/
OD_AXIS: 070
OS_OVR_VA: 20/
OS_AXIS: 110
OD_OVR_VA: 20/

## 2024-12-20 ASSESSMENT — REFRACTION_MANIFEST
OD_SPHERE: +1.00
OD_AXIS: 092
OS_AXIS: 079
OS_SPHERE: +1.25
OS_CYLINDER: -2.00
OD_VA1: 20/40
OS_SPHERE: +1.00
OS_CYLINDER: -1.50
OS_VA1: 20/50
OD_VA1: 20/25-
OD_AXIS: 091
OD_SPHERE: +1.25
OS_VA1: 20/40
OD_CYLINDER: -1.75
OD_CYLINDER: -1.50
OS_AXIS: 080

## 2024-12-20 ASSESSMENT — KERATOMETRY
OS_K2POWER_DIOPTERS: 43.25
OD_K1POWER_DIOPTERS: 41.75
OD_K2POWER_DIOPTERS: 43.00
OS_K1POWER_DIOPTERS: 42.00
OD_AXISANGLE_DEGREES: 003
OS_AXISANGLE_DEGREES: 163
METHOD_AUTO_MANUAL: AUTO

## 2024-12-20 ASSESSMENT — REFRACTION_AUTOREFRACTION
OD_SPHERE: +1.25
OD_AXIS: 092
OS_SPHERE: +1.00
OS_CYLINDER: -1.50
OS_AXIS: 080
OD_CYLINDER: -1.50

## 2024-12-20 ASSESSMENT — TONOMETRY
OS_IOP_MMHG: 11
OD_IOP_MMHG: 12

## 2024-12-20 ASSESSMENT — VISUAL ACUITY
OS_BCVA: 20/60-1
OD_BCVA: 20/60-1

## 2024-12-20 ASSESSMENT — CONFRONTATIONAL VISUAL FIELD TEST (CVF)
OD_FINDINGS: FULL
OS_FINDINGS: FULL

## 2024-12-20 ASSESSMENT — CORNEAL EDEMA - MICROCYSTIC EPITHELIAL EDEMA (MCE): OD_MCE: ABSENT

## 2025-01-08 NOTE — H&P PST ADULT - WEIGHT IN LBS
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 On Mer 15, 1988 he underwent laparotomy and repair of right diaphragmatic defect with mesh at [facility]. The patient states that he was well until November 2023 when he began having right lower chest and posterior chest pain and also mediastinal pain. Imaging showed herniation of the liver through what appeared to be a central tendinous defect into the right chest with inversion of the liver into the chest. On 4/25/24 he underwent right thoracotomy. 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 It was significantly swollen and congested due to the narrow hernia in the diaphragm. Lysis of adhesions in the abdomen were performed. The central tendinous defect was enlarged to remove restriction on the liver. Due to the severe deformity and congestion of the liver it would not reduce into the abdomen and a patch was placed on the diaphragm superior to the liver.  Since his last visit he has been doing reasonably well.  The right upper quadrant discomfort which he had prior to the operation is much improved.  He still has some chest numbness and some hoarseness.  Since the last visit there have been no changes to the past medical history, past surgical history, social history, family history, or review of systems.    His x-ray appears unchanged with the as expected elevated right diaphragmatic patch    Mr. Mcneill Is doing fairly well.  I will see him as needed in the future  
257.9

## 2025-02-26 ENCOUNTER — INPATIENT (INPATIENT)
Facility: HOSPITAL | Age: 85
LOS: 2 days | Discharge: HOME | DRG: 291 | End: 2025-03-01
Attending: INTERNAL MEDICINE | Admitting: INTERNAL MEDICINE
Payer: MEDICARE

## 2025-02-26 VITALS
OXYGEN SATURATION: 96 % | HEART RATE: 91 BPM | WEIGHT: 239.2 LBS | TEMPERATURE: 98 F | DIASTOLIC BLOOD PRESSURE: 92 MMHG | SYSTOLIC BLOOD PRESSURE: 146 MMHG | RESPIRATION RATE: 20 BRPM

## 2025-02-26 DIAGNOSIS — Z98.49 CATARACT EXTRACTION STATUS, UNSPECIFIED EYE: Chronic | ICD-10-CM

## 2025-02-26 DIAGNOSIS — Z98.61 CORONARY ANGIOPLASTY STATUS: Chronic | ICD-10-CM

## 2025-02-26 DIAGNOSIS — Z98.890 OTHER SPECIFIED POSTPROCEDURAL STATES: Chronic | ICD-10-CM

## 2025-02-26 DIAGNOSIS — Z90.89 ACQUIRED ABSENCE OF OTHER ORGANS: Chronic | ICD-10-CM

## 2025-02-26 DIAGNOSIS — Z96.653 PRESENCE OF ARTIFICIAL KNEE JOINT, BILATERAL: Chronic | ICD-10-CM

## 2025-02-26 DIAGNOSIS — Z96.643 PRESENCE OF ARTIFICIAL HIP JOINT, BILATERAL: Chronic | ICD-10-CM

## 2025-02-26 PROBLEM — I42.8 OTHER CARDIOMYOPATHIES: Chronic | Status: ACTIVE | Noted: 2024-05-09

## 2025-02-26 LAB
ALBUMIN SERPL ELPH-MCNC: 3.3 G/DL — SIGNIFICANT CHANGE UP (ref 3.3–5)
ALP SERPL-CCNC: 56 U/L — SIGNIFICANT CHANGE UP (ref 40–120)
ALT FLD-CCNC: 16 U/L — SIGNIFICANT CHANGE UP (ref 12–78)
ANION GAP SERPL CALC-SCNC: 4 MMOL/L — LOW (ref 5–17)
AST SERPL-CCNC: 13 U/L — LOW (ref 15–37)
BASE EXCESS BLDV CALC-SCNC: 5 MMOL/L — HIGH (ref -2–3)
BASOPHILS # BLD AUTO: 0.09 K/UL — SIGNIFICANT CHANGE UP (ref 0–0.2)
BASOPHILS # BLD MANUAL: 0 K/UL — SIGNIFICANT CHANGE UP (ref 0–0.2)
BASOPHILS NFR BLD AUTO: 1 % — SIGNIFICANT CHANGE UP (ref 0–2)
BASOPHILS NFR BLD MANUAL: 0 % — SIGNIFICANT CHANGE UP (ref 0–2)
BILIRUB SERPL-MCNC: 0.4 MG/DL — SIGNIFICANT CHANGE UP (ref 0.2–1.2)
BUN SERPL-MCNC: 14 MG/DL — SIGNIFICANT CHANGE UP (ref 7–23)
CALCIUM SERPL-MCNC: 10 MG/DL — SIGNIFICANT CHANGE UP (ref 8.5–10.1)
CHLORIDE SERPL-SCNC: 107 MMOL/L — SIGNIFICANT CHANGE UP (ref 96–108)
CO2 SERPL-SCNC: 28 MMOL/L — SIGNIFICANT CHANGE UP (ref 22–31)
CREAT SERPL-MCNC: 1.15 MG/DL — SIGNIFICANT CHANGE UP (ref 0.5–1.3)
EGFR: 63 ML/MIN/1.73M2 — SIGNIFICANT CHANGE UP
EGFR: 63 ML/MIN/1.73M2 — SIGNIFICANT CHANGE UP
EOSINOPHIL # BLD AUTO: 2.25 K/UL — HIGH (ref 0–0.5)
EOSINOPHIL # BLD MANUAL: 2.29 K/UL — HIGH (ref 0–0.5)
EOSINOPHIL NFR BLD AUTO: 24.6 % — HIGH (ref 0–6)
EOSINOPHIL NFR BLD MANUAL: 25 % — HIGH (ref 0–6)
FLUAV AG NPH QL: SIGNIFICANT CHANGE UP
FLUBV AG NPH QL: SIGNIFICANT CHANGE UP
GAS PNL BLDV: SIGNIFICANT CHANGE UP
GLUCOSE SERPL-MCNC: 117 MG/DL — HIGH (ref 70–99)
HCO3 BLDV-SCNC: 31 MMOL/L — HIGH (ref 22–29)
HCT VFR BLD CALC: 49.5 % — SIGNIFICANT CHANGE UP (ref 39–50)
HGB BLD-MCNC: 16.3 G/DL — SIGNIFICANT CHANGE UP (ref 13–17)
IMM GRANULOCYTES # BLD AUTO: 0.04 K/UL — SIGNIFICANT CHANGE UP (ref 0–0.07)
IMM GRANULOCYTES NFR BLD AUTO: 0.4 % — SIGNIFICANT CHANGE UP (ref 0–0.9)
LIDOCAIN IGE QN: 31 U/L — SIGNIFICANT CHANGE UP (ref 13–75)
LYMPHOCYTES # BLD AUTO: 1.17 K/UL — SIGNIFICANT CHANGE UP (ref 1–3.3)
LYMPHOCYTES # BLD MANUAL: 1.28 K/UL — SIGNIFICANT CHANGE UP (ref 1–3.3)
LYMPHOCYTES NFR BLD AUTO: 12.8 % — LOW (ref 13–44)
LYMPHOCYTES NFR BLD MANUAL: 14 % — SIGNIFICANT CHANGE UP (ref 13–44)
MACROCYTES BLD QL: SLIGHT — SIGNIFICANT CHANGE UP
MAGNESIUM SERPL-MCNC: 1.9 MG/DL — SIGNIFICANT CHANGE UP (ref 1.6–2.6)
MAGNESIUM SERPL-MCNC: 2 MG/DL — SIGNIFICANT CHANGE UP (ref 1.6–2.6)
MANUAL SMEAR VERIFICATION: SIGNIFICANT CHANGE UP
MCHC RBC-ENTMCNC: 32.7 PG — SIGNIFICANT CHANGE UP (ref 27–34)
MCHC RBC-ENTMCNC: 32.9 G/DL — SIGNIFICANT CHANGE UP (ref 32–36)
MCV RBC AUTO: 99.2 FL — SIGNIFICANT CHANGE UP (ref 80–100)
MONOCYTES # BLD AUTO: 0.82 K/UL — SIGNIFICANT CHANGE UP (ref 0–0.9)
MONOCYTES # BLD MANUAL: 0.73 K/UL — SIGNIFICANT CHANGE UP (ref 0–0.9)
MONOCYTES NFR BLD AUTO: 9 % — SIGNIFICANT CHANGE UP (ref 2–14)
MONOCYTES NFR BLD MANUAL: 8 % — SIGNIFICANT CHANGE UP (ref 2–14)
NEUTROPHILS # BLD AUTO: 4.77 K/UL — SIGNIFICANT CHANGE UP (ref 1.8–7.4)
NEUTROPHILS # BLD MANUAL: 4.84 K/UL — SIGNIFICANT CHANGE UP (ref 1.8–7.4)
NEUTROPHILS NFR BLD AUTO: 52.2 % — SIGNIFICANT CHANGE UP (ref 43–77)
NEUTROPHILS NFR BLD MANUAL: 53 % — SIGNIFICANT CHANGE UP (ref 43–77)
NRBC # BLD AUTO: 0 K/UL — SIGNIFICANT CHANGE UP (ref 0–0)
NRBC # FLD: 0 K/UL — SIGNIFICANT CHANGE UP (ref 0–0)
NRBC BLD AUTO-RTO: 0 /100 WBCS — SIGNIFICANT CHANGE UP (ref 0–0)
NT-PROBNP SERPL-SCNC: 1541 PG/ML — HIGH (ref 0–450)
PCO2 BLDV: 50 MMHG — SIGNIFICANT CHANGE UP (ref 42–55)
PH BLDV: 7.4 — SIGNIFICANT CHANGE UP (ref 7.32–7.43)
PLAT MORPH BLD: NORMAL — SIGNIFICANT CHANGE UP
PLATELET # BLD AUTO: 252 K/UL — SIGNIFICANT CHANGE UP (ref 150–400)
PMV BLD: 9.6 FL — SIGNIFICANT CHANGE UP (ref 7–13)
PO2 BLDV: 61 MMHG — HIGH (ref 25–45)
POLYCHROMASIA BLD QL SMEAR: SLIGHT — SIGNIFICANT CHANGE UP
POTASSIUM SERPL-MCNC: 4.2 MMOL/L — SIGNIFICANT CHANGE UP (ref 3.5–5.3)
POTASSIUM SERPL-SCNC: 4.2 MMOL/L — SIGNIFICANT CHANGE UP (ref 3.5–5.3)
PROT SERPL-MCNC: 6.6 GM/DL — SIGNIFICANT CHANGE UP (ref 6–8.3)
RBC # BLD: 4.99 M/UL — SIGNIFICANT CHANGE UP (ref 4.2–5.8)
RBC # FLD: 13.2 % — SIGNIFICANT CHANGE UP (ref 10.3–14.5)
RBC BLD AUTO: ABNORMAL
RSV RNA NPH QL NAA+NON-PROBE: SIGNIFICANT CHANGE UP
SAO2 % BLDV: 91 % — HIGH (ref 67–88)
SARS-COV-2 RNA SPEC QL NAA+PROBE: SIGNIFICANT CHANGE UP
SODIUM SERPL-SCNC: 139 MMOL/L — SIGNIFICANT CHANGE UP (ref 135–145)
STOMATOCYTES BLD QL SMEAR: SLIGHT — SIGNIFICANT CHANGE UP
TROPONIN I, HIGH SENSITIVITY RESULT: 39.84 NG/L — SIGNIFICANT CHANGE UP
WBC # BLD: 9.14 K/UL — SIGNIFICANT CHANGE UP (ref 3.8–10.5)
WBC # FLD AUTO: 9.14 K/UL — SIGNIFICANT CHANGE UP (ref 3.8–10.5)
WBC MORPHOLOGY: NORMAL — SIGNIFICANT CHANGE UP

## 2025-02-26 PROCEDURE — 71045 X-RAY EXAM CHEST 1 VIEW: CPT | Mod: 26

## 2025-02-26 PROCEDURE — 93306 TTE W/DOPPLER COMPLETE: CPT

## 2025-02-26 PROCEDURE — 80048 BASIC METABOLIC PNL TOTAL CA: CPT

## 2025-02-26 PROCEDURE — 71045 X-RAY EXAM CHEST 1 VIEW: CPT

## 2025-02-26 PROCEDURE — 85027 COMPLETE CBC AUTOMATED: CPT

## 2025-02-26 PROCEDURE — 84100 ASSAY OF PHOSPHORUS: CPT

## 2025-02-26 PROCEDURE — 36415 COLL VENOUS BLD VENIPUNCTURE: CPT

## 2025-02-26 PROCEDURE — 83735 ASSAY OF MAGNESIUM: CPT

## 2025-02-26 PROCEDURE — 99223 1ST HOSP IP/OBS HIGH 75: CPT

## 2025-02-26 PROCEDURE — 99285 EMERGENCY DEPT VISIT HI MDM: CPT

## 2025-02-26 PROCEDURE — 94760 N-INVAS EAR/PLS OXIMETRY 1: CPT

## 2025-02-26 PROCEDURE — 80053 COMPREHEN METABOLIC PANEL: CPT

## 2025-02-26 PROCEDURE — 71250 CT THORAX DX C-: CPT | Mod: 26

## 2025-02-26 PROCEDURE — 83880 ASSAY OF NATRIURETIC PEPTIDE: CPT

## 2025-02-26 RX ORDER — ALBUTEROL SULFATE 2.5 MG/3ML
2 VIAL, NEBULIZER (ML) INHALATION EVERY 6 HOURS
Refills: 0 | Status: DISCONTINUED | OUTPATIENT
Start: 2025-02-26 | End: 2025-03-01

## 2025-02-26 RX ORDER — FUROSEMIDE 10 MG/ML
20 INJECTION INTRAMUSCULAR; INTRAVENOUS DAILY
Refills: 0 | Status: COMPLETED | OUTPATIENT
Start: 2025-02-26 | End: 2025-02-28

## 2025-02-26 RX ORDER — CLOPIDOGREL BISULFATE 75 MG/1
75 TABLET, FILM COATED ORAL DAILY
Refills: 0 | Status: DISCONTINUED | OUTPATIENT
Start: 2025-02-26 | End: 2025-03-01

## 2025-02-26 RX ORDER — IPRATROPIUM BROMIDE AND ALBUTEROL SULFATE .5; 2.5 MG/3ML; MG/3ML
3 SOLUTION RESPIRATORY (INHALATION) ONCE
Refills: 0 | Status: COMPLETED | OUTPATIENT
Start: 2025-02-26 | End: 2025-02-26

## 2025-02-26 RX ORDER — ATORVASTATIN CALCIUM 80 MG/1
20 TABLET, FILM COATED ORAL AT BEDTIME
Refills: 0 | Status: DISCONTINUED | OUTPATIENT
Start: 2025-02-26 | End: 2025-03-01

## 2025-02-26 RX ORDER — MAGNESIUM, ALUMINUM HYDROXIDE 200-200 MG
30 TABLET,CHEWABLE ORAL EVERY 4 HOURS
Refills: 0 | Status: DISCONTINUED | OUTPATIENT
Start: 2025-02-26 | End: 2025-03-01

## 2025-02-26 RX ORDER — ENOXAPARIN SODIUM 100 MG/ML
40 INJECTION SUBCUTANEOUS EVERY 12 HOURS
Refills: 0 | Status: DISCONTINUED | OUTPATIENT
Start: 2025-02-26 | End: 2025-03-01

## 2025-02-26 RX ORDER — ENOXAPARIN SODIUM 100 MG/ML
40 INJECTION SUBCUTANEOUS EVERY 24 HOURS
Refills: 0 | Status: DISCONTINUED | OUTPATIENT
Start: 2025-02-26 | End: 2025-02-26

## 2025-02-26 RX ORDER — TORSEMIDE 20 MG/1
10 TABLET ORAL ONCE
Refills: 0 | Status: COMPLETED | OUTPATIENT
Start: 2025-02-26 | End: 2025-02-26

## 2025-02-26 RX ORDER — CARVEDILOL 3.12 MG/1
25 TABLET, FILM COATED ORAL EVERY 12 HOURS
Refills: 0 | Status: DISCONTINUED | OUTPATIENT
Start: 2025-02-26 | End: 2025-03-01

## 2025-02-26 RX ORDER — B1/B2/B3/B5/B6/B12/VIT C/FOLIC 500-0.5 MG
1 TABLET ORAL DAILY
Refills: 0 | Status: DISCONTINUED | OUTPATIENT
Start: 2025-02-26 | End: 2025-03-01

## 2025-02-26 RX ORDER — ONDANSETRON HCL/PF 4 MG/2 ML
4 VIAL (ML) INJECTION EVERY 8 HOURS
Refills: 0 | Status: DISCONTINUED | OUTPATIENT
Start: 2025-02-26 | End: 2025-03-01

## 2025-02-26 RX ORDER — SPIRONOLACTONE 25 MG
25 TABLET ORAL DAILY
Refills: 0 | Status: DISCONTINUED | OUTPATIENT
Start: 2025-02-26 | End: 2025-03-01

## 2025-02-26 RX ORDER — METHYLPREDNISOLONE ACETATE 80 MG/ML
125 INJECTION, SUSPENSION INTRA-ARTICULAR; INTRALESIONAL; INTRAMUSCULAR; SOFT TISSUE ONCE
Refills: 0 | Status: COMPLETED | OUTPATIENT
Start: 2025-02-26 | End: 2025-02-26

## 2025-02-26 RX ORDER — BENZONATATE 100 MG
100 CAPSULE ORAL THREE TIMES A DAY
Refills: 0 | Status: DISCONTINUED | OUTPATIENT
Start: 2025-02-26 | End: 2025-03-01

## 2025-02-26 RX ORDER — ACETAMINOPHEN 500 MG/5ML
650 LIQUID (ML) ORAL EVERY 6 HOURS
Refills: 0 | Status: DISCONTINUED | OUTPATIENT
Start: 2025-02-26 | End: 2025-03-01

## 2025-02-26 RX ORDER — MELATONIN 5 MG
3 TABLET ORAL AT BEDTIME
Refills: 0 | Status: DISCONTINUED | OUTPATIENT
Start: 2025-02-26 | End: 2025-03-01

## 2025-02-26 RX ADMIN — ATORVASTATIN CALCIUM 20 MILLIGRAM(S): 80 TABLET, FILM COATED ORAL at 21:21

## 2025-02-26 RX ADMIN — ENOXAPARIN SODIUM 40 MILLIGRAM(S): 100 INJECTION SUBCUTANEOUS at 21:21

## 2025-02-26 RX ADMIN — FUROSEMIDE 20 MILLIGRAM(S): 10 INJECTION INTRAMUSCULAR; INTRAVENOUS at 12:24

## 2025-02-26 RX ADMIN — METHYLPREDNISOLONE ACETATE 125 MILLIGRAM(S): 80 INJECTION, SUSPENSION INTRA-ARTICULAR; INTRALESIONAL; INTRAMUSCULAR; SOFT TISSUE at 03:35

## 2025-02-26 RX ADMIN — IPRATROPIUM BROMIDE AND ALBUTEROL SULFATE 3 MILLILITER(S): .5; 2.5 SOLUTION RESPIRATORY (INHALATION) at 02:53

## 2025-02-26 RX ADMIN — Medication 100 MILLIGRAM(S): at 12:53

## 2025-02-26 RX ADMIN — TORSEMIDE 10 MILLIGRAM(S): 20 TABLET ORAL at 02:53

## 2025-02-26 RX ADMIN — CARVEDILOL 25 MILLIGRAM(S): 3.12 TABLET, FILM COATED ORAL at 21:21

## 2025-02-27 ENCOUNTER — RESULT REVIEW (OUTPATIENT)
Age: 85
End: 2025-02-27

## 2025-02-27 DIAGNOSIS — I50.9 HEART FAILURE, UNSPECIFIED: ICD-10-CM

## 2025-02-27 LAB
ADD ON TEST-SPECIMEN IN LAB: SIGNIFICANT CHANGE UP
ANION GAP SERPL CALC-SCNC: 3 MMOL/L — LOW (ref 5–17)
BUN SERPL-MCNC: 23 MG/DL — SIGNIFICANT CHANGE UP (ref 7–23)
CALCIUM SERPL-MCNC: 9.8 MG/DL — SIGNIFICANT CHANGE UP (ref 8.5–10.1)
CHLORIDE SERPL-SCNC: 103 MMOL/L — SIGNIFICANT CHANGE UP (ref 96–108)
CO2 SERPL-SCNC: 31 MMOL/L — SIGNIFICANT CHANGE UP (ref 22–31)
CREAT SERPL-MCNC: 1.12 MG/DL — SIGNIFICANT CHANGE UP (ref 0.5–1.3)
EGFR: 65 ML/MIN/1.73M2 — SIGNIFICANT CHANGE UP
EGFR: 65 ML/MIN/1.73M2 — SIGNIFICANT CHANGE UP
GLUCOSE SERPL-MCNC: 119 MG/DL — HIGH (ref 70–99)
HCT VFR BLD CALC: 47.3 % — SIGNIFICANT CHANGE UP (ref 39–50)
HGB BLD-MCNC: 15.2 G/DL — SIGNIFICANT CHANGE UP (ref 13–17)
MAGNESIUM SERPL-MCNC: 2.1 MG/DL — SIGNIFICANT CHANGE UP (ref 1.6–2.6)
MCHC RBC-ENTMCNC: 31.9 PG — SIGNIFICANT CHANGE UP (ref 27–34)
MCHC RBC-ENTMCNC: 32.1 G/DL — SIGNIFICANT CHANGE UP (ref 32–36)
MCV RBC AUTO: 99.4 FL — SIGNIFICANT CHANGE UP (ref 80–100)
NRBC # BLD AUTO: 0 K/UL — SIGNIFICANT CHANGE UP (ref 0–0)
NRBC # FLD: 0 K/UL — SIGNIFICANT CHANGE UP (ref 0–0)
NRBC BLD AUTO-RTO: 0 /100 WBCS — SIGNIFICANT CHANGE UP (ref 0–0)
NT-PROBNP SERPL-SCNC: 2126 PG/ML — HIGH (ref 0–450)
PLATELET # BLD AUTO: 232 K/UL — SIGNIFICANT CHANGE UP (ref 150–400)
PMV BLD: 9.8 FL — SIGNIFICANT CHANGE UP (ref 7–13)
POTASSIUM SERPL-MCNC: 3.7 MMOL/L — SIGNIFICANT CHANGE UP (ref 3.5–5.3)
POTASSIUM SERPL-SCNC: 3.7 MMOL/L — SIGNIFICANT CHANGE UP (ref 3.5–5.3)
RBC # BLD: 4.76 M/UL — SIGNIFICANT CHANGE UP (ref 4.2–5.8)
RBC # FLD: 12.9 % — SIGNIFICANT CHANGE UP (ref 10.3–14.5)
SODIUM SERPL-SCNC: 137 MMOL/L — SIGNIFICANT CHANGE UP (ref 135–145)
WBC # BLD: 10.7 K/UL — HIGH (ref 3.8–10.5)
WBC # FLD AUTO: 10.7 K/UL — HIGH (ref 3.8–10.5)

## 2025-02-27 PROCEDURE — 99233 SBSQ HOSP IP/OBS HIGH 50: CPT

## 2025-02-27 PROCEDURE — 93306 TTE W/DOPPLER COMPLETE: CPT | Mod: 26

## 2025-02-27 RX ADMIN — ENOXAPARIN SODIUM 40 MILLIGRAM(S): 100 INJECTION SUBCUTANEOUS at 10:25

## 2025-02-27 RX ADMIN — Medication 1 TABLET(S): at 10:25

## 2025-02-27 RX ADMIN — ENOXAPARIN SODIUM 40 MILLIGRAM(S): 100 INJECTION SUBCUTANEOUS at 21:03

## 2025-02-27 RX ADMIN — Medication 100 MILLIGRAM(S): at 21:02

## 2025-02-27 RX ADMIN — Medication 40 MILLIGRAM(S): at 10:25

## 2025-02-27 RX ADMIN — FUROSEMIDE 20 MILLIGRAM(S): 10 INJECTION INTRAMUSCULAR; INTRAVENOUS at 10:25

## 2025-02-27 RX ADMIN — ATORVASTATIN CALCIUM 20 MILLIGRAM(S): 80 TABLET, FILM COATED ORAL at 21:02

## 2025-02-27 RX ADMIN — CARVEDILOL 25 MILLIGRAM(S): 3.12 TABLET, FILM COATED ORAL at 10:25

## 2025-02-27 RX ADMIN — CLOPIDOGREL BISULFATE 75 MILLIGRAM(S): 75 TABLET, FILM COATED ORAL at 10:25

## 2025-02-27 RX ADMIN — Medication 25 MILLIGRAM(S): at 10:25

## 2025-02-28 LAB
ALBUMIN SERPL ELPH-MCNC: 3.1 G/DL — LOW (ref 3.3–5)
ALP SERPL-CCNC: 43 U/L — SIGNIFICANT CHANGE UP (ref 40–120)
ALT FLD-CCNC: 16 U/L — SIGNIFICANT CHANGE UP (ref 12–78)
ANION GAP SERPL CALC-SCNC: 4 MMOL/L — LOW (ref 5–17)
ANION GAP SERPL CALC-SCNC: 4 MMOL/L — LOW (ref 5–17)
AST SERPL-CCNC: 12 U/L — LOW (ref 15–37)
BILIRUB SERPL-MCNC: 0.6 MG/DL — SIGNIFICANT CHANGE UP (ref 0.2–1.2)
BUN SERPL-MCNC: 28 MG/DL — HIGH (ref 7–23)
BUN SERPL-MCNC: 32 MG/DL — HIGH (ref 7–23)
CALCIUM SERPL-MCNC: 9.5 MG/DL — SIGNIFICANT CHANGE UP (ref 8.5–10.1)
CALCIUM SERPL-MCNC: 9.8 MG/DL — SIGNIFICANT CHANGE UP (ref 8.5–10.1)
CHLORIDE SERPL-SCNC: 105 MMOL/L — SIGNIFICANT CHANGE UP (ref 96–108)
CHLORIDE SERPL-SCNC: 106 MMOL/L — SIGNIFICANT CHANGE UP (ref 96–108)
CO2 SERPL-SCNC: 27 MMOL/L — SIGNIFICANT CHANGE UP (ref 22–31)
CO2 SERPL-SCNC: 30 MMOL/L — SIGNIFICANT CHANGE UP (ref 22–31)
CREAT SERPL-MCNC: 1.05 MG/DL — SIGNIFICANT CHANGE UP (ref 0.5–1.3)
CREAT SERPL-MCNC: 1.33 MG/DL — HIGH (ref 0.5–1.3)
EGFR: 53 ML/MIN/1.73M2 — LOW
EGFR: 53 ML/MIN/1.73M2 — LOW
EGFR: 70 ML/MIN/1.73M2 — SIGNIFICANT CHANGE UP
EGFR: 70 ML/MIN/1.73M2 — SIGNIFICANT CHANGE UP
GLUCOSE SERPL-MCNC: 100 MG/DL — HIGH (ref 70–99)
GLUCOSE SERPL-MCNC: 109 MG/DL — HIGH (ref 70–99)
HCT VFR BLD CALC: 47 % — SIGNIFICANT CHANGE UP (ref 39–50)
HGB BLD-MCNC: 15.3 G/DL — SIGNIFICANT CHANGE UP (ref 13–17)
MAGNESIUM SERPL-MCNC: 1.8 MG/DL — SIGNIFICANT CHANGE UP (ref 1.6–2.6)
MAGNESIUM SERPL-MCNC: 2 MG/DL — SIGNIFICANT CHANGE UP (ref 1.6–2.6)
MCHC RBC-ENTMCNC: 32.1 PG — SIGNIFICANT CHANGE UP (ref 27–34)
MCHC RBC-ENTMCNC: 32.6 G/DL — SIGNIFICANT CHANGE UP (ref 32–36)
MCV RBC AUTO: 98.5 FL — SIGNIFICANT CHANGE UP (ref 80–100)
NRBC # BLD AUTO: 0 K/UL — SIGNIFICANT CHANGE UP (ref 0–0)
NRBC # FLD: 0 K/UL — SIGNIFICANT CHANGE UP (ref 0–0)
NRBC BLD AUTO-RTO: 0 /100 WBCS — SIGNIFICANT CHANGE UP (ref 0–0)
NT-PROBNP SERPL-SCNC: 842 PG/ML — HIGH (ref 0–450)
PHOSPHATE SERPL-MCNC: 3.8 MG/DL — SIGNIFICANT CHANGE UP (ref 2.5–4.5)
PLATELET # BLD AUTO: 221 K/UL — SIGNIFICANT CHANGE UP (ref 150–400)
PMV BLD: 9.8 FL — SIGNIFICANT CHANGE UP (ref 7–13)
POTASSIUM SERPL-MCNC: 3.8 MMOL/L — SIGNIFICANT CHANGE UP (ref 3.5–5.3)
POTASSIUM SERPL-MCNC: 4.1 MMOL/L — SIGNIFICANT CHANGE UP (ref 3.5–5.3)
POTASSIUM SERPL-SCNC: 3.8 MMOL/L — SIGNIFICANT CHANGE UP (ref 3.5–5.3)
POTASSIUM SERPL-SCNC: 4.1 MMOL/L — SIGNIFICANT CHANGE UP (ref 3.5–5.3)
PROT SERPL-MCNC: 6.6 GM/DL — SIGNIFICANT CHANGE UP (ref 6–8.3)
RBC # BLD: 4.77 M/UL — SIGNIFICANT CHANGE UP (ref 4.2–5.8)
RBC # FLD: 13 % — SIGNIFICANT CHANGE UP (ref 10.3–14.5)
SODIUM SERPL-SCNC: 137 MMOL/L — SIGNIFICANT CHANGE UP (ref 135–145)
SODIUM SERPL-SCNC: 139 MMOL/L — SIGNIFICANT CHANGE UP (ref 135–145)
WBC # BLD: 9.18 K/UL — SIGNIFICANT CHANGE UP (ref 3.8–10.5)
WBC # FLD AUTO: 9.18 K/UL — SIGNIFICANT CHANGE UP (ref 3.8–10.5)

## 2025-02-28 PROCEDURE — 71045 X-RAY EXAM CHEST 1 VIEW: CPT | Mod: 26

## 2025-02-28 PROCEDURE — 99233 SBSQ HOSP IP/OBS HIGH 50: CPT

## 2025-02-28 RX ADMIN — CLOPIDOGREL BISULFATE 75 MILLIGRAM(S): 75 TABLET, FILM COATED ORAL at 10:13

## 2025-02-28 RX ADMIN — ENOXAPARIN SODIUM 40 MILLIGRAM(S): 100 INJECTION SUBCUTANEOUS at 10:14

## 2025-02-28 RX ADMIN — ATORVASTATIN CALCIUM 20 MILLIGRAM(S): 80 TABLET, FILM COATED ORAL at 23:34

## 2025-02-28 RX ADMIN — CARVEDILOL 25 MILLIGRAM(S): 3.12 TABLET, FILM COATED ORAL at 23:33

## 2025-02-28 RX ADMIN — Medication 1 TABLET(S): at 10:13

## 2025-02-28 RX ADMIN — FUROSEMIDE 20 MILLIGRAM(S): 10 INJECTION INTRAMUSCULAR; INTRAVENOUS at 11:21

## 2025-02-28 RX ADMIN — Medication 100 MILLIGRAM(S): at 10:13

## 2025-02-28 RX ADMIN — Medication 3 MILLIGRAM(S): at 23:33

## 2025-02-28 RX ADMIN — Medication 25 MILLIGRAM(S): at 10:14

## 2025-02-28 RX ADMIN — ENOXAPARIN SODIUM 40 MILLIGRAM(S): 100 INJECTION SUBCUTANEOUS at 23:34

## 2025-03-01 ENCOUNTER — TRANSCRIPTION ENCOUNTER (OUTPATIENT)
Age: 85
End: 2025-03-01

## 2025-03-01 VITALS
DIASTOLIC BLOOD PRESSURE: 61 MMHG | HEART RATE: 62 BPM | RESPIRATION RATE: 18 BRPM | SYSTOLIC BLOOD PRESSURE: 115 MMHG | OXYGEN SATURATION: 94 %

## 2025-03-01 LAB
ANION GAP SERPL CALC-SCNC: 6 MMOL/L — SIGNIFICANT CHANGE UP (ref 5–17)
BUN SERPL-MCNC: 23 MG/DL — SIGNIFICANT CHANGE UP (ref 7–23)
CALCIUM SERPL-MCNC: 9.8 MG/DL — SIGNIFICANT CHANGE UP (ref 8.5–10.1)
CHLORIDE SERPL-SCNC: 104 MMOL/L — SIGNIFICANT CHANGE UP (ref 96–108)
CO2 SERPL-SCNC: 29 MMOL/L — SIGNIFICANT CHANGE UP (ref 22–31)
CREAT SERPL-MCNC: 1.14 MG/DL — SIGNIFICANT CHANGE UP (ref 0.5–1.3)
EGFR: 63 ML/MIN/1.73M2 — SIGNIFICANT CHANGE UP
EGFR: 63 ML/MIN/1.73M2 — SIGNIFICANT CHANGE UP
GLUCOSE SERPL-MCNC: 164 MG/DL — HIGH (ref 70–99)
NT-PROBNP SERPL-SCNC: 696 PG/ML — HIGH (ref 0–450)
POTASSIUM SERPL-MCNC: 4.2 MMOL/L — SIGNIFICANT CHANGE UP (ref 3.5–5.3)
POTASSIUM SERPL-SCNC: 4.2 MMOL/L — SIGNIFICANT CHANGE UP (ref 3.5–5.3)
SODIUM SERPL-SCNC: 139 MMOL/L — SIGNIFICANT CHANGE UP (ref 135–145)

## 2025-03-01 PROCEDURE — 99239 HOSP IP/OBS DSCHRG MGMT >30: CPT

## 2025-03-01 RX ORDER — APIXABAN 2.5 MG/1
1 TABLET, FILM COATED ORAL
Qty: 60 | Refills: 0
Start: 2025-03-01 | End: 2025-03-30

## 2025-03-01 RX ORDER — CARVEDILOL 3.12 MG/1
1 TABLET, FILM COATED ORAL
Refills: 0 | DISCHARGE

## 2025-03-01 RX ORDER — BENZONATATE 100 MG
1 CAPSULE ORAL
Qty: 30 | Refills: 0
Start: 2025-03-01 | End: 2025-03-10

## 2025-03-01 RX ORDER — PREDNISONE 20 MG/1
1 TABLET ORAL
Qty: 3 | Refills: 0
Start: 2025-03-01 | End: 2025-03-03

## 2025-03-01 RX ORDER — CARVEDILOL 3.12 MG/1
1 TABLET, FILM COATED ORAL
Qty: 0 | Refills: 0 | DISCHARGE
Start: 2025-03-01

## 2025-03-01 RX ADMIN — Medication 1 TABLET(S): at 09:48

## 2025-03-01 RX ADMIN — CARVEDILOL 25 MILLIGRAM(S): 3.12 TABLET, FILM COATED ORAL at 09:54

## 2025-03-01 RX ADMIN — CLOPIDOGREL BISULFATE 75 MILLIGRAM(S): 75 TABLET, FILM COATED ORAL at 09:48

## 2025-03-01 RX ADMIN — Medication 25 MILLIGRAM(S): at 09:49

## 2025-03-01 RX ADMIN — Medication 40 MILLIGRAM(S): at 09:49

## 2025-03-01 RX ADMIN — ENOXAPARIN SODIUM 40 MILLIGRAM(S): 100 INJECTION SUBCUTANEOUS at 09:51

## 2025-03-05 DIAGNOSIS — Z88.8 ALLERGY STATUS TO OTHER DRUGS, MEDICAMENTS AND BIOLOGICAL SUBSTANCES: ICD-10-CM

## 2025-03-05 DIAGNOSIS — I50.21 ACUTE SYSTOLIC (CONGESTIVE) HEART FAILURE: ICD-10-CM

## 2025-03-05 DIAGNOSIS — I25.10 ATHEROSCLEROTIC HEART DISEASE OF NATIVE CORONARY ARTERY WITHOUT ANGINA PECTORIS: ICD-10-CM

## 2025-03-05 DIAGNOSIS — E66.9 OBESITY, UNSPECIFIED: ICD-10-CM

## 2025-03-05 DIAGNOSIS — Z87.891 PERSONAL HISTORY OF NICOTINE DEPENDENCE: ICD-10-CM

## 2025-03-05 DIAGNOSIS — K21.9 GASTRO-ESOPHAGEAL REFLUX DISEASE WITHOUT ESOPHAGITIS: ICD-10-CM

## 2025-03-05 DIAGNOSIS — Z91.040 LATEX ALLERGY STATUS: ICD-10-CM

## 2025-03-05 DIAGNOSIS — R06.02 SHORTNESS OF BREATH: ICD-10-CM

## 2025-03-05 DIAGNOSIS — Z95.5 PRESENCE OF CORONARY ANGIOPLASTY IMPLANT AND GRAFT: ICD-10-CM

## 2025-03-05 DIAGNOSIS — I42.8 OTHER CARDIOMYOPATHIES: ICD-10-CM

## 2025-03-05 DIAGNOSIS — I48.91 UNSPECIFIED ATRIAL FIBRILLATION: ICD-10-CM

## 2025-03-05 DIAGNOSIS — Z66 DO NOT RESUSCITATE: ICD-10-CM

## 2025-03-05 DIAGNOSIS — E78.00 PURE HYPERCHOLESTEROLEMIA, UNSPECIFIED: ICD-10-CM

## 2025-03-05 DIAGNOSIS — I11.0 HYPERTENSIVE HEART DISEASE WITH HEART FAILURE: ICD-10-CM

## 2025-03-05 DIAGNOSIS — Z79.899 OTHER LONG TERM (CURRENT) DRUG THERAPY: ICD-10-CM

## 2025-03-12 ENCOUNTER — APPOINTMENT (OUTPATIENT)
Dept: CARDIOTHORACIC SURGERY | Facility: CLINIC | Age: 85
End: 2025-03-12

## 2025-03-20 NOTE — H&P PST ADULT - NSANTHOSAYNRD_GEN_A_CORE
Recently, labs were drawn at the office.  Your vitamin D level is low.  I am going to have you begin vitamin D supplement.  I am going to start you on 6000 units a day for 8 weeks.  We will then recheck your vitamin D level.  The remainder of your labs were normal.  Specifically, your thyroid function was normal and your hemoglobin A1c was also normal.  Alkaline phosphatase was mildly elevated.  This is down from previous testing.  Your white blood cell count was mildly elevated.  This is something we will keep an eye on.  Your hemoglobin and hematocrit were normal.  CRP which measures inflammation was also normal.  Please keep your follow-up appointment.Roberto Montes is a 50 y.o. male who presents to the office today as a follow up appointment regarding Crohn's Disease      History of Present Illness:    Interval History 12/7/2023: The patient presents today for follow-up Crohn's disease.  He has recently been followed by Jenny Nettles PA-C.  The patient is currently on Humira and mesalamine.  The patient underwent colonoscopy in July 2012 22 which showed scattered erosions and erythema throughout the colon.  Biopsies revealed chronic active ileitis and mild acute colitis.  On April 23, 2023 his fecal calprotectin was 166.  This is down from previously collected fecal calprotectin which was 895.  His last CRP was performed on 3/2/2023.  This was normal at 0.30.  This had previously been elevated.  A recent CBC revealed normal hemoglobin and hematocrit.  This was performed in November 2023.  Due to involvement of the terminal ileum, the patient takes Colestid due to suspected poor bile acid recirculation in the terminal ileum.  At the time of his last visit, the patient had complained of heartburn and reflux.  He had been given Protonix but did not take this routinely per Akira Nettles's notes.  She subsequently placed him on Prevacid.  He has bowel movement 3 to 5 times a day.  His stool is for  "the most part formed.  He does feel that he empties well.  He admits he does not take his Colestid every day. This can constipate him a bit.  He generally takes it a couple of times a week. He had his gallbladder was removed a few weeks ago.  He states his gallbladder was nonfunctioning.  He has not noted increased bowel movements with cholecystectomy. He is doing better on Prevacid in respect to heartburn.  He had an AMI in the Sept 2023. He tells me it was a \"blood clot\".  He is now on ASA and Brillanta.  He initially lost 15 lbs when sick with Crohn's disease but has gained some weight back.  He states \"I am 200% better\" than when we first started to see him for Crohn's disease.  No BRBPR.  He does describe some mild intermittent tenderness in the right lower abdominal pain but this is not worrisome.  He is wondering if he should get the pneumovax 23 vaccination.    Interval history 4/11/2024: The patient presents today for follow-up Crohn's disease.  He is currently on Humira and mesalamine.  His last colonoscopy was in July 20,2022.  This showed scattered erosions and erythema throughout the colon.  In April 2023 his fecal calprotectin was 166.  It had previously been 895.  His last CRP was performed in March 2023 this was normal at 0.30.  He underwent cholecystectomy in the fall of last year.  He is on Colestid for suspected bile acid diarrhea.  His stool is a Sims score 3-4.  His last CMP was performed in March 2024.  This was mildly elevated.  His vitamin D level was low at 16.1.  His PCP placed him on Vitamin D supplement once a week. He has been doing well on Humira.  He will have occasional abdominal pain which is fleeting.  He is having a bowel movement once a day.  Occasionally, he will have up to 5 bm daily.  This seems to becoming less often. He denies BRBPR or melena. He feels like his energy improved. He does have joint pain.  He has issues with pain in the knees, ankles primarily.  He takes " "Tylenol.  Heartburn and reflux under good control on Prevacid.      Interval history 10/17/2024: The patient presents today for follow-up Crohn's disease.  He is currently on Humira and mesalamine.  He takes this as prescribed.  QuantiFERON gold was checked earlier in the year and was negative.  He states for the past week and a half he has experienced abdominal tenderness.He denies nausea and vomiting.  He is having some mild heartburn for which he takes Prevacid. He does take this daily.  He notes that he when he eats he gets full fast.  He has a bowel movement 3 times daily.  He does feel like he empties well.  His stool is formed. He does take Colestid for bile acid reflux.  He does state his abdomen does swell but more recently he has noted some reduction in swelling.  No blood in the stool.  No weight loss.  Nothing seems to bring the pain on.  He does note that it is better if he sits \"really still\".  He rates the pain as a 6 out of 10.  The pain has been constant for the past week and a half. He denies NSAID use. He has had this pain before and has gone away.      Interval history 1/23/2025: Patient presents today follow-up Crohn's disease.  He had been on Humira and mesalamine.  Recent CT enterography revealed ileitis.  This was performed because the patient had noted increased swelling in his abdomen and increased abdominal pain.  CT enterography revealed the following: multifocal wall thickening of the distal ileum with combination of both hyperemia and areas of prominence of the submucosal fat with relative narrowing and focal proximal segments of mild small bowel dilatation most consistent with multiple skip lesions in the setting of IBD terminal ileitis. Areas of hyperemia suggest relative acute ileitis superimposed on chronic ileitis, no bowel obstruction. no abscess or CT evidence of fistula. The Humira has been discontinued.  He was placed on Stelara.  He received the Stelara infusion January 7th.  " "The patient states prednisone helped his pain in the right lower abdomen.  He is tapering off prednisone. He complains of low energy.  He does have some diarrhea but \"not bad\".  He is complaining of acid reflux. He is on Prevacid.  He has gained weight but he feels this is due to prednisone.  He continues to experience some abdominal bloating     Interval history 3/20/2025: The patient presents today for follow-up Crohn's disease.  He had been on Humira and mesalamine.  Recent CT enterography of the abdomen and pelvis revealed ileitis.  I switched the patient to Stelara.  He was also on a prednisone taper.  He presented to the ER on 3/17/2025 with complaint of bloody stools.  CT scan of the abdomen pelvis was performed.  This was read as normal except for 2 lung nodules.  The patient was placed on a prednisone taper and antibiotics for perirectal swelling.  No further bleeding since prednisone and antibiotics.  He was placed on Cipro and Metronidizole.  He is taking 20mg prednisone right now.  He is having about 4 bm daily without blood.  His stool is a BSS 5-6.  No abodomianl pain currently. He is on mesalamine.  Of note, the patient states that he did well after his first infusion of Stelara.  However, after the first injection SQ of Stelara he experienced a severe headache and joint aches.  This lasted for approximately 2 weeks.  He does not feel that he can take this medication because of this.          Review of Systems:  Review of Systems   Constitutional:  Positive for activity change. Negative for appetite change, chills, fatigue, fever and unexpected weight change.   HENT:  Negative for mouth sores and trouble swallowing.    Eyes:  Negative for photophobia, pain and redness.   Respiratory:  Negative for cough and choking.    Cardiovascular:  Negative for chest pain and leg swelling.   Gastrointestinal:  Positive for abdominal distention, abdominal pain, anal bleeding and nausea. Negative for constipation, " diarrhea, rectal pain and vomiting.   Endocrine: Negative for cold intolerance, heat intolerance and polyphagia.   Genitourinary:  Negative for difficulty urinating and dysuria.   Musculoskeletal:  Negative for arthralgias, joint swelling and myalgias.   Skin:  Negative for rash and wound.   Allergic/Immunologic: Negative for environmental allergies and food allergies.   Neurological:  Negative for dizziness and light-headedness.   Hematological:  Negative for adenopathy. Does not bruise/bleed easily.   Psychiatric/Behavioral:  Negative for sleep disturbance. The patient is not nervous/anxious.        Past Medical History:  Past Medical History:   Diagnosis Date    Anemia     Arthritis     BPH without obstruction/lower urinary tract symptoms 4/2/2024    Coronary artery disease     Crohn's disease     Elevated cholesterol     Erectile dysfunction     GERD (gastroesophageal reflux disease)     H/O blood clots     Heart attack     Hormone disorder 9 years    Hypertension     Interstitial cystitis     Low back pain     STEMI (ST elevation myocardial infarction) 09/06/2022    Ulcerative colitis        Past Surgical History:  Past Surgical History:   Procedure Laterality Date    CARDIAC CATHETERIZATION N/A 09/06/2022    Procedure: Left Heart Cath;  Surgeon: Matthew Hill MD;  Location: [facility] INVASIVE LOCATION;  Service: Cardiovascular;  Laterality: N/A;    CARDIAC CATHETERIZATION N/A 09/06/2022    Procedure: Percutaneous Coronary Intervention;  Surgeon: Matthew Hill MD;  Location: [facility] INVASIVE LOCATION;  Service: Cardiovascular;  Laterality: N/A;    CARDIAC SURGERY      CHOLECYSTECTOMY N/A 11/13/2023    Procedure: CHOLECYSTECTOMY LAPAROSCOPIC WITH DAVINCI ROBOT;  Surgeon: Gonzalo Rudolph MD;  Location: [facility] OR;  Service: Robotics - DaVinci;  Laterality: N/A;    COLONOSCOPY N/A 07/12/2022    Procedure: COLONOSCOPY;  Surgeon: Stella Aparicio MD;  Location: [facility]  OR;  Service: Gastroenterology;  Laterality: N/A;    CYST REMOVAL      shoulder    FRACTURE SURGERY Right     foot/toes    VASCULAR SURGERY      VASECTOMY         Family History:  Family History   Problem Relation Age of Onset    Colon cancer Mother     Cancer Father        Social History:  Social History     Socioeconomic History    Marital status:    Tobacco Use    Smoking status: Every Day     Current packs/day: 0.50     Average packs/day: 0.5 packs/day for 35.0 years (17.5 ttl pk-yrs)     Types: Cigarettes     Passive exposure: Current    Smokeless tobacco: Never    Tobacco comments:     Smoking cessation encouraged   Vaping Use    Vaping status: Every Day    Substances: Nicotine    Devices: Disposable   Substance and Sexual Activity    Alcohol use: No    Drug use: No    Sexual activity: Yes     Partners: Female     Birth control/protection: Vasectomy       Current Medication List:    Current Outpatient Medications:     aspirin (Aspirin Low Dose) 81 MG EC tablet, Take 1 tablet by mouth Daily., Disp: 90 tablet, Rfl: 2    atorvastatin (LIPITOR) 80 MG tablet, Take 1 tablet by mouth Every Night., Disp: 90 tablet, Rfl: 3    Cholecalciferol (Vitamin D) 50 MCG (2000 UT) capsule, Take 3 capsules by mouth Daily., Disp: 90 capsule, Rfl: 2    ciprofloxacin (CIPRO) 500 MG tablet, Take 1 tablet by mouth 2 (Two) Times a Day., Disp: 20 tablet, Rfl: 0    colestipol (COLESTID) 1 g tablet, TAKE ONE (1) TABLET BY MOUTH DAILY., Disp: 90 tablet, Rfl: 2    Evolocumab (REPATHA) solution auto-injector SureClick injection, Inject 1 mL under the skin into the appropriate area as directed Every 14 (Fourteen) Days., Disp: 6 mL, Rfl: 2    lansoprazole (PREVACID) 30 MG capsule, Take 1 capsule by mouth 2 (Two) Times a Day., Disp: 180 capsule, Rfl: 3    lisinopril (PRINIVIL,ZESTRIL) 10 MG tablet, Take 1 tablet by mouth Daily for 360 days., Disp: 90 tablet, Rfl: 3    mesalamine (Apriso) 0.375 g 24 hr capsule, Take 4 capsules by mouth  "Daily., Disp: 120 capsule, Rfl: 11    metroNIDAZOLE (FLAGYL) 500 MG tablet, Take 1 tablet by mouth 3 (Three) Times a Day., Disp: 30 tablet, Rfl: 0    nitroglycerin (NITROSTAT) 0.4 MG SL tablet, DISSOLVE ONE (1) TABLET UNDER THE TONGUE AS NEEDED FOR ANGINA, MAY REPEAT EVERY FIVE (5) MINUTES FOR UP THREE DOSES, Disp: 25 tablet, Rfl: 0    tamsulosin (FLOMAX) 0.4 MG capsule 24 hr capsule, Take 1 capsule by mouth Daily., Disp: 30 capsule, Rfl: 5    traMADol (ULTRAM) 50 MG tablet, Take 1 tablet by mouth Every 8 (Eight) Hours As Needed for Moderate Pain., Disp: 30 tablet, Rfl: 0    triamcinolone (KENALOG) 0.1 % cream, Apply 1 application  topically to the appropriate area as directed 2 (Two) Times a Day., Disp: 80 g, Rfl: 3    ustekinumab (Stelara) 45 MG/0.5ML injection, Inject 0.5 mL under the skin into the appropriate area as directed Every 3 (Three) Months., Disp: , Rfl:     Ustekinumab (Stelara) 90 MG/ML solution prefilled syringe Injection, Inject 90 mg under the skin into the appropriate area as directed Every 8 (Eight) Weeks., Disp: 1 mL, Rfl: 2    Allergies:   Stelara [ustekinumab]    Vitals:  /63   Pulse 79   Ht 188 cm (74\")   Wt 93.4 kg (205 lb 12.8 oz)   BMI 26.42 kg/m²     Physical Exam:  Physical Exam  Constitutional:       Appearance: He is normal weight.   HENT:      Head: Normocephalic and atraumatic.      Nose: Nose normal. No congestion or rhinorrhea.   Eyes:      General: No scleral icterus.     Extraocular Movements: Extraocular movements intact.      Conjunctiva/sclera: Conjunctivae normal.      Pupils: Pupils are equal, round, and reactive to light.   Cardiovascular:      Rate and Rhythm: Normal rate and regular rhythm.      Pulses: Normal pulses.      Heart sounds: Normal heart sounds.   Pulmonary:      Effort: Pulmonary effort is normal.      Breath sounds: Normal breath sounds.   Abdominal:      General: Abdomen is flat. Bowel sounds are normal. There is no distension.      Palpations: " Abdomen is soft. There is no shifting dullness, fluid wave, hepatomegaly, splenomegaly, mass or pulsatile mass.      Tenderness: There is no guarding or rebound.      Hernia: No hernia is present.   Musculoskeletal:         General: No swelling or tenderness.      Cervical back: Normal range of motion and neck supple.   Skin:     General: Skin is warm and dry.      Coloration: Skin is not jaundiced.   Neurological:      General: No focal deficit present.      Mental Status: He is alert and oriented to person, place, and time.   Psychiatric:         Mood and Affect: Mood normal.         Behavior: Behavior normal.         Results Review:  Lab Results:   Lab on 03/18/2025   Component Date Value Ref Range Status    Calprotectin, Fecal 03/18/2025 715 (H)  0 - 120 ug/g Final    Concentration     Interpretation   Follow-Up  < 5 - 50 ug/g     Normal           None  >50 -120 ug/g     Borderline       Re-evaluate in 4-6 weeks      >120 ug/g     Abnormal         Repeat as clinically                                     indicated    Campylobacter 03/18/2025 Not Detected  Not Detected Final    Plesiomonas shigelloides 03/18/2025 Not Detected  Not Detected Final    Salmonella 03/18/2025 Not Detected  Not Detected Final    Vibrio 03/18/2025 Not Detected  Not Detected Final    Vibrio cholerae 03/18/2025 Not Detected  Not Detected Final    Yersinia enterocolitica 03/18/2025 Not Detected  Not Detected Final    Enteroaggregative E. coli (EAEC) 03/18/2025 Not Detected  Not Detected Final    Enteropathogenic E. coli (EPEC) 03/18/2025 Not Detected  Not Detected Final    Enterotoxigenic E. coli (ETEC) lt/* 03/18/2025 Not Detected  Not Detected Final    Shiga-like toxin-producing E. coli* 03/18/2025 Not Detected  Not Detected Final    Shigella/Enteroinvasive E. coli (E* 03/18/2025 Not Detected  Not Detected Final    Cryptosporidium 03/18/2025 Not Detected  Not Detected Final    Cyclospora cayetanensis 03/18/2025 Not Detected  Not Detected  Final    Entamoeba histolytica 03/18/2025 Not Detected  Not Detected Final    Giardia lamblia 03/18/2025 Not Detected  Not Detected Final    Adenovirus F40/41 03/18/2025 Not Detected  Not Detected Final    Astrovirus 03/18/2025 Not Detected  Not Detected Final    Norovirus GI/GII 03/18/2025 Not Detected  Not Detected Final    Rotavirus A 03/18/2025 Not Detected  Not Detected Final    Sapovirus (I, II, IV or V) 03/18/2025 Not Detected  Not Detected Final    Toxigenic C. difficile by PCR 03/18/2025 Negative  Negative Final    027 Toxin 03/18/2025 Presumptive Negative   Final    Ova + Parasite Exam 03/18/2025 Final report   Final    These results were obtained using wet preparation(s) and trichrome  stained smear. This test does not include testing for Cryptosporidium  parvum, Cyclospora, or Microsporidia.    Ova + Parasite Result 1 03/18/2025 Comment   Final    No ova, cysts, or parasites seen.  One negative specimen does not rule out the possibility of a  parasitic infection.   Admission on 03/17/2025, Discharged on 03/17/2025   Component Date Value Ref Range Status    Glucose 03/17/2025 118 (H)  65 - 99 mg/dL Final    BUN 03/17/2025 12  6 - 20 mg/dL Final    Creatinine 03/17/2025 0.82  0.76 - 1.27 mg/dL Final    Sodium 03/17/2025 140  136 - 145 mmol/L Final    Potassium 03/17/2025 4.4  3.5 - 5.2 mmol/L Final    Slight hemolysis detected by analyzer. Result may be falsely elevated.    Chloride 03/17/2025 104  98 - 107 mmol/L Final    CO2 03/17/2025 24.6  22.0 - 29.0 mmol/L Final    Calcium 03/17/2025 9.5  8.6 - 10.5 mg/dL Final    Total Protein 03/17/2025 7.5  6.0 - 8.5 g/dL Final    Albumin 03/17/2025 4.2  3.5 - 5.2 g/dL Final    ALT (SGPT) 03/17/2025 23  1 - 41 U/L Final    AST (SGOT) 03/17/2025 20  1 - 40 U/L Final    Alkaline Phosphatase 03/17/2025 146 (H)  39 - 117 U/L Final    Total Bilirubin 03/17/2025 0.6  0.0 - 1.2 mg/dL Final    Globulin 03/17/2025 3.3  gm/dL Final    A/G Ratio 03/17/2025 1.3  g/dL Final     BUN/Creatinine Ratio 03/17/2025 14.6  7.0 - 25.0 Final    Anion Gap 03/17/2025 11.4  5.0 - 15.0 mmol/L Final    eGFR 03/17/2025 107.0  >60.0 mL/min/1.73 Final    Protime 03/17/2025 12.1  11.6 - 15.1 Seconds Final    INR 03/17/2025 0.89 (L)  0.90 - 1.10 Final    PTT 03/17/2025 30.4  24.5 - 35.9 seconds Final    ABO Type 03/17/2025 O   Final    RH type 03/17/2025 Negative   Final    Antibody Screen 03/17/2025 Negative   Final    T&S Expiration Date 03/17/2025 3/20/2025 11:59:59 PM   Final    WBC 03/17/2025 12.18 (H)  3.40 - 10.80 10*3/mm3 Final    RBC 03/17/2025 4.86  4.14 - 5.80 10*6/mm3 Final    Hemoglobin 03/17/2025 14.2  13.0 - 17.7 g/dL Final    Hematocrit 03/17/2025 44.8  37.5 - 51.0 % Final    MCV 03/17/2025 92.2  79.0 - 97.0 fL Final    MCH 03/17/2025 29.2  26.6 - 33.0 pg Final    MCHC 03/17/2025 31.7  31.5 - 35.7 g/dL Final    RDW 03/17/2025 13.3  12.3 - 15.4 % Final    RDW-SD 03/17/2025 45.7  37.0 - 54.0 fl Final    MPV 03/17/2025 11.2  6.0 - 12.0 fL Final    Platelets 03/17/2025 294  140 - 450 10*3/mm3 Final    Neutrophil % 03/17/2025 68.9  42.7 - 76.0 % Final    Lymphocyte % 03/17/2025 20.9  19.6 - 45.3 % Final    Monocyte % 03/17/2025 8.0  5.0 - 12.0 % Final    Eosinophil % 03/17/2025 1.0  0.3 - 6.2 % Final    Basophil % 03/17/2025 0.8  0.0 - 1.5 % Final    Immature Grans % 03/17/2025 0.4  0.0 - 0.5 % Final    Neutrophils, Absolute 03/17/2025 8.40 (H)  1.70 - 7.00 10*3/mm3 Final    Lymphocytes, Absolute 03/17/2025 2.54  0.70 - 3.10 10*3/mm3 Final    Monocytes, Absolute 03/17/2025 0.97 (H)  0.10 - 0.90 10*3/mm3 Final    Eosinophils, Absolute 03/17/2025 0.12  0.00 - 0.40 10*3/mm3 Final    Basophils, Absolute 03/17/2025 0.10  0.00 - 0.20 10*3/mm3 Final    Immature Grans, Absolute 03/17/2025 0.05  0.00 - 0.05 10*3/mm3 Final    nRBC 03/17/2025 0.0  0.0 - 0.2 /100 WBC Final    Extra Tube 03/17/2025 Hold for add-ons.   Final    Auto resulted.    Extra Tube 03/17/2025 hold for add-on   Final    Auto resulted     Extra Tube 03/17/2025 Hold for add-ons.   Final    Auto resulted.    Extra Tube 03/17/2025 Hold for add-ons.   Final    Auto resulted    ABO Type 03/17/2025 O   Final    RH type 03/17/2025 Negative   Final       Assessment & Plan     Visit Diagnoses:    ICD-10-CM ICD-9-CM   1. Crohn's disease of small intestine without complication  K50.00 555.0   2. BRBPR (bright red blood per rectum)  K62.5 569.3   3. Vitamin D deficiency  E55.9 268.9   4. Gastroesophageal reflux disease, unspecified whether esophagitis present  K21.9 530.81               Plan:    1.  Crohn's disease of the colon and small intestine: The patient complained of severe headache and joint aches after his first injection of Stelara.  He did well with the first infusion but the subsequent injection resulted in severe headache and joint pain for approximately 2 weeks.  This has finally resolved.  He did recently go to the ER with complaint of bloody stools.  His hemoglobin and hematocrit remained stable.  A CT scan of the abdomen and pelvis did not show evidence of inflammatory bowel disease.  He was placed on a prednisone taper and Cipro and Flagyl.  His symptoms have since resolved.  I am going to see if I can get Skyrizi approved.  Of course, I will need to follow his liver enzymes as Skyrizi can potentially cause hepatotoxicity.  He does have a mildly elevated alk phosphatase at this point.  Will monitor closely.  I will check TPMT genetics in case I need to add azathioprine to a TNF alpha if Skyrizi is not approved.  2.  GERD: The patient is having GERD symptoms.  Resolved on Prevacid twice daily.    3.  Diarrhea: He is having more formed stool but stool is loose.  He is having up to 4 bowel movements a day.  4.  Vitamin D deficiency: The patient is currently taking vitamin D.    MEDS ORDERED DURING VISIT:  No orders of the defined types were placed in this encounter.      Return in about 8 weeks (around 5/15/2025).             This document has  been electronically signed by Stella Aparicio MD   March 20, 2025 15:47 EDT      Part of this note may be an electronic transcription/translation of spoken language to printed text using the Dragon Dictation System.   No. ALEXANDRO screening performed.  STOP BANG Legend: 0-2 = LOW Risk; 3-4 = INTERMEDIATE Risk; 5-8 = HIGH Risk

## 2025-04-19 ENCOUNTER — APPOINTMENT (OUTPATIENT)
Dept: CT IMAGING | Facility: CLINIC | Age: 85
End: 2025-04-19

## 2025-04-19 PROCEDURE — 75572 CT HRT W/3D IMAGE: CPT

## 2025-05-07 NOTE — ASU PATIENT PROFILE, ADULT - FALL HARM RISK - HARM RISK INTERVENTIONS

## 2025-05-07 NOTE — ASU PATIENT PROFILE, ADULT - URINARY CATHETER
Spoke with patient in regard to upcoming follow up with Dr. Stephen on 12/10/24 at 84S.     Advised to stop antihistamines such as Allegra, Claritin, Zyrtec, or Xyzal, and Astelin or Patanase 5 days prior to appointment. Also advised to stop Benadryl, ranitidine or famotidine 24 hours prior to appointment; should physician determine allergy testing is appropriate at visit. Instructed to continue all other medications as prescribed, including inhalers.    Patient verbalized understanding, agrees with plan at this time, and denied any further questions for staff at this time.     no

## 2025-05-07 NOTE — ASU PATIENT PROFILE, ADULT - FALL HARM RISK - PT AGE POPULATION HIDDEN
Head, normocephalic, atraumatic, Face, Face within normal limits, Ears, External ears within normal limits
Adult

## 2025-05-08 ENCOUNTER — INPATIENT (INPATIENT)
Facility: HOSPITAL | Age: 85
LOS: 0 days | Discharge: HOME | End: 2025-05-09
Attending: INTERNAL MEDICINE | Admitting: INTERNAL MEDICINE
Payer: MEDICARE

## 2025-05-08 VITALS
WEIGHT: 229.94 LBS | RESPIRATION RATE: 17 BRPM | DIASTOLIC BLOOD PRESSURE: 82 MMHG | TEMPERATURE: 98 F | HEART RATE: 90 BPM | HEIGHT: 70 IN | SYSTOLIC BLOOD PRESSURE: 141 MMHG | OXYGEN SATURATION: 97 %

## 2025-05-08 DIAGNOSIS — Z98.61 CORONARY ANGIOPLASTY STATUS: Chronic | ICD-10-CM

## 2025-05-08 DIAGNOSIS — J44.9 CHRONIC OBSTRUCTIVE PULMONARY DISEASE, UNSPECIFIED: ICD-10-CM

## 2025-05-08 DIAGNOSIS — Z98.890 OTHER SPECIFIED POSTPROCEDURAL STATES: Chronic | ICD-10-CM

## 2025-05-08 DIAGNOSIS — I48.91 UNSPECIFIED ATRIAL FIBRILLATION: ICD-10-CM

## 2025-05-08 DIAGNOSIS — I10 ESSENTIAL (PRIMARY) HYPERTENSION: ICD-10-CM

## 2025-05-08 DIAGNOSIS — Z98.49 CATARACT EXTRACTION STATUS, UNSPECIFIED EYE: Chronic | ICD-10-CM

## 2025-05-08 DIAGNOSIS — Z96.653 PRESENCE OF ARTIFICIAL KNEE JOINT, BILATERAL: Chronic | ICD-10-CM

## 2025-05-08 DIAGNOSIS — I50.22 CHRONIC SYSTOLIC (CONGESTIVE) HEART FAILURE: ICD-10-CM

## 2025-05-08 DIAGNOSIS — Z29.9 ENCOUNTER FOR PROPHYLACTIC MEASURES, UNSPECIFIED: ICD-10-CM

## 2025-05-08 DIAGNOSIS — I48.0 PAROXYSMAL ATRIAL FIBRILLATION: ICD-10-CM

## 2025-05-08 DIAGNOSIS — Z96.643 PRESENCE OF ARTIFICIAL HIP JOINT, BILATERAL: Chronic | ICD-10-CM

## 2025-05-08 DIAGNOSIS — Z90.89 ACQUIRED ABSENCE OF OTHER ORGANS: Chronic | ICD-10-CM

## 2025-05-08 DIAGNOSIS — I25.10 ATHEROSCLEROTIC HEART DISEASE OF NATIVE CORONARY ARTERY WITHOUT ANGINA PECTORIS: ICD-10-CM

## 2025-05-08 LAB
ANION GAP SERPL CALC-SCNC: 13 MMOL/L — SIGNIFICANT CHANGE UP (ref 7–14)
BLD GP AB SCN SERPL QL: NEGATIVE — SIGNIFICANT CHANGE UP
BUN SERPL-MCNC: 15 MG/DL — SIGNIFICANT CHANGE UP (ref 7–23)
CALCIUM SERPL-MCNC: 9.9 MG/DL — SIGNIFICANT CHANGE UP (ref 8.4–10.5)
CHLORIDE SERPL-SCNC: 102 MMOL/L — SIGNIFICANT CHANGE UP (ref 98–107)
CO2 SERPL-SCNC: 22 MMOL/L — SIGNIFICANT CHANGE UP (ref 22–31)
CREAT SERPL-MCNC: 1.01 MG/DL — SIGNIFICANT CHANGE UP (ref 0.5–1.3)
EGFR: 73 ML/MIN/1.73M2 — SIGNIFICANT CHANGE UP
EGFR: 73 ML/MIN/1.73M2 — SIGNIFICANT CHANGE UP
GLUCOSE SERPL-MCNC: 129 MG/DL — HIGH (ref 70–99)
HCT VFR BLD CALC: 45.6 % — SIGNIFICANT CHANGE UP (ref 39–50)
HGB BLD-MCNC: 15.2 G/DL — SIGNIFICANT CHANGE UP (ref 13–17)
MAGNESIUM SERPL-MCNC: 1.8 MG/DL — SIGNIFICANT CHANGE UP (ref 1.6–2.6)
MCHC RBC-ENTMCNC: 32 PG — SIGNIFICANT CHANGE UP (ref 27–34)
MCHC RBC-ENTMCNC: 33.3 G/DL — SIGNIFICANT CHANGE UP (ref 32–36)
MCV RBC AUTO: 96 FL — SIGNIFICANT CHANGE UP (ref 80–100)
NRBC # BLD AUTO: 0 K/UL — SIGNIFICANT CHANGE UP (ref 0–0)
NRBC # FLD: 0 K/UL — SIGNIFICANT CHANGE UP (ref 0–0)
NRBC BLD AUTO-RTO: 0 /100 WBCS — SIGNIFICANT CHANGE UP (ref 0–0)
PHOSPHATE SERPL-MCNC: 3.3 MG/DL — SIGNIFICANT CHANGE UP (ref 2.5–4.5)
PLATELET # BLD AUTO: 214 K/UL — SIGNIFICANT CHANGE UP (ref 150–400)
POTASSIUM SERPL-MCNC: 4.9 MMOL/L — SIGNIFICANT CHANGE UP (ref 3.5–5.3)
POTASSIUM SERPL-SCNC: 4.9 MMOL/L — SIGNIFICANT CHANGE UP (ref 3.5–5.3)
RBC # BLD: 4.75 M/UL — SIGNIFICANT CHANGE UP (ref 4.2–5.8)
RBC # FLD: 13.2 % — SIGNIFICANT CHANGE UP (ref 10.3–14.5)
RH IG SCN BLD-IMP: NEGATIVE — SIGNIFICANT CHANGE UP
RH IG SCN BLD-IMP: NEGATIVE — SIGNIFICANT CHANGE UP
SODIUM SERPL-SCNC: 137 MMOL/L — SIGNIFICANT CHANGE UP (ref 135–145)
WBC # BLD: 12.12 K/UL — HIGH (ref 3.8–10.5)
WBC # FLD AUTO: 12.12 K/UL — HIGH (ref 3.8–10.5)

## 2025-05-08 PROCEDURE — 99497 ADVNCD CARE PLAN 30 MIN: CPT | Mod: 25

## 2025-05-08 PROCEDURE — 99223 1ST HOSP IP/OBS HIGH 75: CPT

## 2025-05-08 PROCEDURE — 93010 ELECTROCARDIOGRAM REPORT: CPT | Mod: 77

## 2025-05-08 PROCEDURE — 93010 ELECTROCARDIOGRAM REPORT: CPT

## 2025-05-08 RX ORDER — ALBUTEROL SULFATE 2.5 MG/3ML
2 VIAL, NEBULIZER (ML) INHALATION EVERY 6 HOURS
Refills: 0 | Status: DISCONTINUED | OUTPATIENT
Start: 2025-05-08 | End: 2025-05-09

## 2025-05-08 RX ORDER — SPIRONOLACTONE 25 MG
25 TABLET ORAL DAILY
Refills: 0 | Status: DISCONTINUED | OUTPATIENT
Start: 2025-05-08 | End: 2025-05-09

## 2025-05-08 RX ORDER — B1/B2/B3/B5/B6/B12/VIT C/FOLIC 500-0.5 MG
1 TABLET ORAL
Refills: 0 | DISCHARGE

## 2025-05-08 RX ORDER — CLOPIDOGREL BISULFATE 75 MG/1
75 TABLET, FILM COATED ORAL DAILY
Refills: 0 | Status: DISCONTINUED | OUTPATIENT
Start: 2025-05-08 | End: 2025-05-09

## 2025-05-08 RX ORDER — TORSEMIDE 10 MG
10 TABLET ORAL DAILY
Refills: 0 | Status: DISCONTINUED | OUTPATIENT
Start: 2025-05-09 | End: 2025-05-09

## 2025-05-08 RX ORDER — APIXABAN 2.5 MG/1
5 TABLET, FILM COATED ORAL EVERY 12 HOURS
Refills: 0 | Status: DISCONTINUED | OUTPATIENT
Start: 2025-05-09 | End: 2025-05-09

## 2025-05-08 RX ORDER — CARVEDILOL 3.12 MG/1
25 TABLET, FILM COATED ORAL EVERY 12 HOURS
Refills: 0 | Status: DISCONTINUED | OUTPATIENT
Start: 2025-05-09 | End: 2025-05-09

## 2025-05-08 RX ADMIN — Medication 3 MILLILITER(S): at 21:35

## 2025-05-08 NOTE — CONSULT NOTE ADULT - SUBJECTIVE AND OBJECTIVE BOX
85M with PMHx of HTN, HLD, COPD, CAD s/p RCA VINOD (9/2013), CKD, AFib on Eliquis, ascending aortic aneurysm (4.2cm), and HFrEF (EF 35%) s/p ICD (5/2024) presenting for elective AFib ablation.    Patient underwent ablation procedure today. He reports doing well post-procedurally without acute complaints. Prior to procedure, he was at his baseline state of health.  He endorses chronic WILBURN which has been stable. Denies LE edema or orthopnea. No CP, fevers, chills, cough, abd pain, N/V, diarrhea, or dysuria.    ED Course::  Vitals: Afebrile, HR 70s-90s, BP 110s-140s/70s-80s, SpO2 upper 90s% on RA  Labs: Pending  EKG: NSR with 1st degree AV block and LBBB    Review of Systems :  CONSTITUTIONAL: Denies fevers, chills  CARDIOVASCULAR: Denies CP, LE edema, orthopnea  RESPIRATORY: (+) chronic WILBURN, denies cough  GASTROINTESTINAL: Denies abd pain, N/V, diarrhea  GENITOURINARY: Denies dysuria  All other systems reviewed and negative    Medications / Allergies / PMH / PSH / Family History:  Medications:  - Carvedilol 25mg BID  - KCl 10mEq daily  - Spironolactone 25mg daily  - Albuterol inhaler q6h PRN  - Torsemide 10mg daily  - Valsartan 40mg daily  - Plavix 75mg daily  - Eliquis 5mg BID    PMH:  - HTN  - HLD  - CAD s/p RCA VINOD (9/2013)  - CKD  - AFib on Eliquis  - Ascending aortic aneurysm (4.2cm)  - HFrEF (EF 35%) s/p [employer] ICD (5/2024)  - COPD    Social History:  Independent with ADLs. Former smoker (2 PPD x 10 years, quit 45 years ago). Occasional EtOH socially. No illicit drugs.    Physical Exam:  GEN: Well-appearing elderly male, NAD  RESP: Mild bilateral wheezes, no crackles, comfortable on RA  CV: RRR, no m/r/g  ABD: Soft, NT, ND  EXT: Warm, no edema  NEURO: A&O, answering questions appropriately    Labs/Imaging/EKG:  Labs: Pending  EKG: NSR with 1st degree AV block and LBBB

## 2025-05-08 NOTE — PATIENT PROFILE ADULT - DO YOU FEEL UNSAFE AT HOME, WORK, OR SCHOOL?
Danika Garcia . Chief Complaint   Patient presents with    Wound Check       History and Physical    Mr. Leigh Simon is a 76year old male who returns to our office for continued management of his bilateral skin tears and chronic right medial ankle ulcer. He is still going to wound care and he states his ankle ulcer is worsening and he has more skin tears. He says his skin just peels off when his bandages are removed. He also reports pain and burning in his legs. Past Medical History:   Diagnosis Date    Acid reflux     Anemia     Atrial fibrillation ([facility])     CAD (coronary artery disease)     Chronic obstructive pulmonary disease ([facility])     Diabetes ([facility])     GERD (gastroesophageal reflux disease)     H/O seasonal allergies     Hypercholesterolemia     Hypertension     Ill-defined condition     hand tremors    PAF (paroxysmal atrial fibrillation) ([address] 11/5/2017    Sleep apnea     pt states he has not used bipap \"in years\".     Thyroid disease      Patient Active Problem List   Diagnosis Code    PAD (peripheral artery disease) ([facility]) I73.9    Venous reflux I87.2    Leg ulcer, left ([address] L97.929    Leg ulcer ([address] L97.909    Diabetic ulcer of right midfoot associated with type 2 diabetes mellitus, with fat layer exposed ([address] E11.621, L97.412    Sepsis ([address] A41.9    CAD (coronary artery disease) I25.10    COPD (chronic obstructive pulmonary disease) ([facility]) J44.9    HTN (hypertension) I10    UTI (urinary tract infection) due to urinary indwelling Sher catheter ([facility]) T83.511A, N39.0    PAF (paroxysmal atrial fibrillation) ([facility]) I48.0    UTI (urinary tract infection) N39.0    Diabetic ulcer of both lower extremities ([facility]) E11.622, L97.919, L97.929    Hyponatremia E87.1    Hypomagnesemia E83.42    Hypokalemia E87.6    Severe protein-calorie malnutrition ([facility]) E43    Ulcer of right lower extremity, limited to breakdown of skin ([address] L97.911    Type 2 diabetes mellitus with diabetic neuropathy ([address] E11.40     Past Surgical History:   Procedure Laterality Date    ABDOMEN SURGERY PROC UNLISTED  1997    Vikas-en-Y    ABDOMEN SURGERY PROC UNLISTED  2000    abdominal hernia repair/mesh    HX COLONOSCOPY      HX HEENT      cataracts removed right eye    HX ORTHOPAEDIC  1982    cervical laminectomy    HX UROLOGICAL  12/2015    suprapubic catheter in place    VASCULAR SURGERY PROCEDURE UNLIST       Current Outpatient Prescriptions   Medication Sig Dispense Refill    oxyCODONE-acetaminophen (PERCOCET) 5-325 mg per tablet Take 1-2 Tabs by mouth every eight (8) hours as needed for Pain for up to 14 days. Max Daily Amount: 6 Tabs. 84 Tab 0    pregabalin (LYRICA) 50 mg capsule Take  by mouth.  collagenase (SANTYL) 250 unit/gram ointment Apply  to affected area daily. 30 g 4    Omeprazole delayed release (PRILOSEC D/R) 20 mg tablet Take 20 mg by mouth daily.  potassium citrate (UROCIT-K10) 10 mEq (1,080 mg) TbER Take 10 mEq by mouth two (2) times a day.  multivitamin, tx-iron-ca-min (THERA-M W/ IRON) 9 mg iron-400 mcg tab tablet Take 1 Tab by mouth daily.  acetaminophen (TYLENOL EXTRA STRENGTH) 500 mg tablet Take 500 mg by mouth every six (6) hours as needed for Pain.  iron aspgly,kb-Y-I02-FA-Ca-suc (FERREX 150 FORTE PLUS) 065-67-83-2 mg-mg-mcg-mg cap cap Take 1 Cap by mouth daily.  insulin aspart (NOVOLOG FLEXPEN) 100 unit/mL inpn by SubCUTAneous route Before breakfast, lunch, and dinner. Sliding scale      metFORMIN (GLUCOPHAGE) 1,000 mg tablet Take 1,000 mg by mouth two (2) times daily (with meals).  atorvastatin (LIPITOR) 20 mg tablet Take  by mouth daily.  lisinopril (PRINIVIL, ZESTRIL) 2.5 mg tablet Take  by mouth daily.  albuterol (PROAIR HFA) 90 mcg/actuation inhaler Take  by inhalation.  clopidogrel (PLAVIX) 75 mg tab Take  by mouth.       cyanocobalamin (VITAMIN B-12) 1,000 mcg/mL injection 1,000 mcg by IntraMUSCular route every thirty (30) days.  aspirin 81 mg tablet Take 81 mg by mouth daily.  insulin glargine (LANTUS) 100 unit/mL injection 20 Units by SubCUTAneous route nightly.  furosemide (LASIX) 40 mg tablet Take 40 mg by mouth daily.  diltiazem (TIAZAC) 360 mg SR capsule Take 360 mg by mouth daily.  tiotropium (SPIRIVA WITH HANDIHALER) 18 mcg inhalation capsule Take 1 Cap by inhalation daily.  loratadine (CLARITIN) 10 mg tablet Take 10 mg by mouth daily.  fluticasone (FLONASE) 50 mcg/actuation nasal spray 2 Sprays by Both Nostrils route daily.  traMADol (ULTRAM) 50 mg tablet Take 50 mg by mouth daily.  gabapentin (NEURONTIN) 300 mg capsule Take 300 mg by mouth two (2) times a day. One in the am and 2 at hs      LACTASE ENZYME PO Take 1 Cap by mouth daily.  levothyroxine (SYNTHROID) 25 mcg tablet Take 100 mcg by mouth Daily (before breakfast).  fluticasone-salmeterol (ADVAIR DISKUS) 250-50 mcg/dose diskus inhaler Take 1 Puff by inhalation every twelve (12) hours. Allergies   Allergen Reactions    Latex Contact Dermatitis     Pt states he is NOT allergic to latex.  Neosporin [Neomycin-Bacitracin-Polymyxin] Rash     Social History     Social History    Marital status:      Spouse name: N/A    Number of children: N/A    Years of education: N/A     Occupational History    Not on file.      Social History Main Topics    Smoking status: Former Smoker     Types: Cigarettes     Quit date: 1/1/1997    Smokeless tobacco: Never Used    Alcohol use 0.6 oz/week     1 Glasses of wine per week    Drug use: No    Sexual activity: Not on file     Other Topics Concern    Not on file     Social History Narrative      Family History   Problem Relation Age of Onset    Cancer Mother     Diabetes Father     Heart Disease Sister     Cancer Brother     Diabetes Brother     Hypertension Brother        Review of Systems    Review of Systems   Constitutional: Negative for chills, fever, malaise/fatigue and weight loss. HENT: Negative for ear pain and hearing loss. Eyes: Negative for blurred vision, pain and discharge. Respiratory: Negative for cough, hemoptysis and sputum production. Cardiovascular: Negative for chest pain, palpitations and orthopnea. Gastrointestinal: Negative for heartburn, nausea and vomiting. Genitourinary: Negative for dysuria and urgency. Skin: Negative for itching and rash. +skin tears and ulcers   Neurological: Negative for dizziness, tingling, weakness and headaches. Endo/Heme/Allergies: Does not bruise/bleed easily. Psychiatric/Behavioral: Negative for depression. Physical Exam:    Visit Vitals    /74 (BP 1 Location: Left arm, BP Patient Position: Sitting)    Pulse 80    Resp 18    Ht 6' 2\" (1.88 m)    Wt 155 lb (70.3 kg)    BMI 19.9 kg/m2      Physical Examination: General appearance - alert, in no distress, and chronically ill appearing  Mental status - alert, oriented to person, place, and time, normal mood, behavior, speech, dress, motor activity, and thought processes  Eyes - left eye normal, right eye normal  Ears - right ear normal, left ear normal  Mouth - mucous membranes moist  Chest - clear to auscultation, no wheezes  Heart - normal rate and regular rhythm, +murmur  Abdomen - soft, nontender, nondistended  Musculoskeletal - no obvious deformity  Extremities - warm and well-perfused, +DP signals  Skin - scattered skin tears to lower legs bilaterally, right medial ankle ulcer      Impression and Plan:    ICD-10-CM ICD-9-CM    1. Ulcer of right lower extremity, limited to breakdown of skin ([facility] L97.911 707.10    2. Venous reflux I87.2 459.81    3.  Pain in both lower extremities M79.604 729.5 oxyCODONE-acetaminophen (PERCOCET) 5-325 mg per tablet    M79.605       Orders Placed This Encounter    oxyCODONE-acetaminophen (PERCOCET) 5-325 mg per tablet     Mr. La Abreu feels like his right ankle ulcer is worsening. I agree that it does not seem any better, even after being debrided in our office and by wound care weekly. I had a discussion with Mr. and . Leidy Mariano about surgical debridement and they would like to proceed. We will arrange for surgical debridement with possible [place]in placement as soon as feasible. He will likely need cardiac and medical clearance. We will see Mr. Leidy Mariano for follow up after discharge. Follow-up Disposition:  Return in about 3 weeks (around 4/24/2018). The treatment plan was reviewed with the patient in detail. The patient voiced understanding of this plan and all questions and concerns were addressed. The patient agrees with this plan. We discussed the signs and symptoms that would require earlier attention or intervention. The patient was given educational material related to his/her visit and the patient has voiced understanding of the material.     I appreciate the opportunity to participate in the care of your patient. I will be sure to keep you informed of any subsequent changes in the treatment plan. If you have any questions or concerns, please feel free to contact me. Tatyana Cyr NP    PLEASE NOTE:  This document has been produced using voice recognition software. Unrecognized errors in transcription may be present. no

## 2025-05-08 NOTE — H&P ADULT - NSHPPHYSICALEXAM_GEN_ALL_CORE
PHYSICAL EXAM:  GENERAL: NAD  HEAD:  Atraumatic, Normocephalic  EYES: EOMI, PERRL, conjunctiva and sclera clear  NECK: Supple, No JVD  CHEST/LUNG: Clear to auscultation bilaterally; No wheezes, rales or rhonchi; normal work of breathing, speaking in full sentences  HEART: Regular rate and rhythm; No murmurs, rubs, or gallops, (+)S1, S2  ABDOMEN: Soft, Nontender, Nondistended; Normal Bowel sounds   EXTREMITIES:  2+ Peripheral Pulses, No clubbing, cyanosis, or edema  PSYCH: normal mood and affect, A&Ox3  NEUROLOGY: no focal neuro deficits  SKIN: No rashes or lesions

## 2025-05-08 NOTE — CONSULT NOTE ADULT - CONSULT REASON
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 Since his last visit 07/27/2021 the patient states that he has been feeling well. The patient denies any new problems since his last visit. The patient denies any chest pains, palpitations, or shortness of breath. The patient denies any lightheadedness or dizziness. There is no orthopnea or PND. No other complaints at this time.    Per his initial cardiology consultation in 10/2020 for evaluation of transient ischemic attack.  The patient states that on 10/18/2020 he had an episode of severe vertigo.  He was also having some dry heaving.  At that time he was having some loose bowel movements so he decided to take some Pepto-Bismol.  He got up to go back into the bathroom and got severely dizzy and presented into the emergency room.  He was having some blurry vision when they called the rescue squad.  The patient was taken to [facility] [place] and admitted.  Is CT angiogram of the head revealed moderate arthrosclerosis.  MRI of the brain showed no acute intracranial pathology.  However there were small old bilateral basal ganglia and left pontine infarct.  Echocardiogram shows no atrial septal defect.  There was preserved left ventricular function.  He is not having any complaints of discomfort in his chest or shortness of breath.  Patient has not had any lightheadedness or dizziness.  He has a experiencing palpitations.  There is no syncopal episode.  The patient does not smoke and rarely has alcohol.  The patient is retired.  The family history is unremarkable for coronary artery disease.    Loop Recorder Implant  11/06/2020    Echocardiogram 10/18/2020  Summary:  1. Left ventricle: Systolic function is normal. The estimated  ejection fraction is 60-65%. Wall motion is normal; there are  no regional wall motion abnormalities. Doppler parameters are  consistent with abnormal left ventricular relaxation (grade 1  diastolic dysfunction). The cavity size is normal. Wall  thickness is mildly to moderately increased.  2. Aortic valve: The valve is trileaflet. The leaflets are mildly  thickened and mildly calcified. There is no significant  regurgitation.  3. Aortic root: The aortic root is not dilated.  4. Mitral valve: The annulus is mildly calcified. The leaflets are  mildly thickened. There is mild regurgitation.  5. Left atrium: The atrium is mildly dilated. The volume index  during systole is 35 ml/m^2.  6. Right ventricle: The cavity size is normal. Wall thickness is  normal. RV systolic pressure: 24 mm Hg (S, e   st).  7. Atrial septum: The septum is intact. NEGATIVE  microcavitation study. Color flow Doppler shows no shunt.  8. Tricuspid valve: There is trivial regurgitation.  9. Pulmonic valve: There is trivial regurgitation.  10. Inferior vena cava: The vessel is normal in size. The  respirophasic diameter changes are in the normal range.  11. Pericardium, extracardiac: There is no pericardial effusion.    MRI of brain 10/18/2020  Diagnostic Imaging:  Mr Brain Wo + W Cont  Result Date: 10/18/2020  Impression 1. No acute intracranial pathology evident. 2. Mild cerebral atrophy. Small old bilateral basal ganglia and left pontine infarct. Mild cerebral white matter disease. Dictated by: Juice Quinn Electronically Signed by: Juice Quinn on 10/18/2020 4:13 PM     CT angiogram of head and neck 10/18/2020   Ct Angio Head & Neck With Contrast  Result Date: 10/18/2020  Impression 1. Atherosclerotic calcification and mild stenosis at bilateral distal common carotid arteries and proximal internal carotid arteries. 2. No  intracranial arterial stenosis, thrombus, dissection, or occlusion evident. Bilateral cavernous internal carotid artery atherosclerotic calcification. 3. Mild cerebral atrophy. Several small right caudate nucleus head infarcts of uncertain age, likely chronic to subacute. Small chronic infarcts within bilateral external capsules. Mild bilateral frontal cerebral white matter disease. 4. Mild right maxillary sinus disease. 5. Multilevel degenerative cervical spine disease. 6. Several subcentimeter left thyroid lobe nodules. Study was performed emergently after hours and contemporaneous preliminary report was provided by [facility] radiologist. Dictated by: Juice Quinn Electronically Signed by: Juice Quinn on 10/18/2020 9:54 AM #359: Imaging study named according to standardized nomenclature per the ACR's CT Radiation Dose Index Registry. #361: The patient's information from this CT study has been entered into the ACR CT Radiation Dose Index Registry for tracking purposes      MEDICAL HISTORY   1. Transient ischemic attack  2. History of CVA   3. Arthrosclerotic calcification and mild stenosis of bilateral distal common carotid and proximal internal carotid arteries  4. Hyperlipidemia     Past Medical History:   Diagnosis Date   • Arthritis    • Cerebral infarction (CMS/HCC)     tia   • Essential (primary) hypertension    • High cholesterol    • Irregular heart beat    • Serum calcium elevated        SURGICAL HISTORY       Past Surgical History:   Procedure Laterality Date   • Cataract extraction, bilateral     • Eye surgery      sivakumar cataracts   • Tonsillectomy         SOCIAL HISTORY     Social History     Tobacco Use   • Smoking status: Never Smoker   • Smokeless tobacco: Never Used   Substance Use Topics   • Alcohol use: Yes     Comment: rare   • Drug use: Never       FAMILY HISTORY     Family History   Problem Relation Age of Onset   • Cancer Mother    • Cancer Father        MEDICATIONS     Current Outpatient  Medications   Medication Sig   • atorvastatin (LIPITOR) 20 MG tablet Take 1 tablet by mouth nightly.   • metoPROLOL succinate (TOPROL-XL) 25 MG 24 hr tablet Take 1 tablet by mouth daily.   • lisinopril (ZESTRIL) 10 MG tablet TAKE 1 TABLET BY MOUTH DAILY   • clopidogrel (PLAVIX) 75 MG tablet TAKE 1 TABLET BY MOUTH DAILY   • ondansetron (ZOFRAN) 4 MG tablet Take by mouth every 8 hours as needed.    • acetaminophen (TYLENOL) 325 MG tablet Take 650 mg by mouth every 6 hours as needed.   • cannabidiol (CBD) oil Take by mouth as needed.      No current facility-administered medications for this visit.       ALLERGIES   ALLERGIES:  No Known Allergies      PHYSICAL EXAM     Vitals:    Visit Vitals  /78   Pulse 72   Resp 16   Ht 6' (1.829 m)   Wt 93.6 kg (206 lb 6.4 oz)   BMI 27.99 kg/m²     General:  NAD, pleasant, alert and oriented x3.  Neck:  Bilateral carotid bruits.  No JVD (jugular venous distension).  Normal carotid upstroke.  Pulmonary:  No retractions or increased work of breathing.  Clear to auscultation bilaterally.  No crackles or wheezing.  Cardiovascular:  PMI (point of maximal impulse) in 5th intercostal place.  RRR.  Normal S1 and S2.  No S3 or S4 appreciated.   There is a 2/6 systolic murmur in the right upper sternal border.  Abdomen:  Bowel sounds normoactive.  Soft, nontender, nondistended.  No hepatosplenomegaly.  Extremities:  No edema or cyanosis.     CHADS VASc Score   Congestive Heart Failure: 0 (YES: 1, NO: 0)   Hypertension: 0 (YES: 1, NO: 0)   Age: 2 (<65: 0, 65-74: 1, >75: 2)   Diabetes Mellitus: 0 (YES: 1, NO: 0)   Stroke/TIA (transient ischemic attack)/Thromboembolism: 2 (YES: 2, NO: 0)   Vascular Disease: 0 (YES: 1, NO: 0)   Sex: 0 (Male: 0, Female: 1)   Total Score: 4  CHADSVASC clinical risk estimation. Adapted from Kevin et al.     [id number]   SCORE  ADJUSTED STROKE RATE (% year)    0  0%    1  1,3%    2  2,2%    3  3,2%    4  4,0%    5  6,7%    6  9,8%    7  9,6%    8  6,7%    9   15,2%        ASSESSMENT     1. Transient ischemic attack 10/18/2020 s/p loop recorder implant 11/06/2020  2. CVA with small old bilateral basal ganglia and left pontine infarct.    3. Bilateral carotid bruits  -Mild carotid stenosis by CT angiogram 10/18/2020  4. Hyperlipidemia  5. 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 I reviewed the patient's loop recorder reading which has not demonstrated any evidence of atrial fibrillation. The patient will continue with the Plavix 75 mg once daily. Patient's 12-lead ECG in 10/2020 for the patient's bradycardia demonstrated normal sinus rhythm with frequent PVCs in a pattern of bigeminy. His blood pressure and pulse are stable. The patient is stable from a cardiac standpoint. I will follow up with him in 6 months time. He will contact me sooner for any concerns. Thank you for allowing me to participate in the care of this patient.    On 1/27/2022, I Jose Maria De La O, personally transcribed this document on behalf of Dr. Yonny Asher MD.    The documentation recorded by the scribe accurately and completely reflects the service(s) I personally performed and the decisions made by me.         Sincerely,    Yonny Asher MD.-PhD, [facility]     Medical management s/p ablation

## 2025-05-08 NOTE — CONSULT NOTE ADULT - ASSESSMENT
85M with HTN, CAD, CKD, AFib, HFrEF s/p elective AFib ablation. Currently hemodynamically stable post-procedure with appropriate rhythm on EKG. 85M with HTN, CAD, CKD, COPD, AFib on eliquis, HFrEF s/p elective AFib ablation. Currently hemodynamically stable post-procedure with appropriate rhythm on EKG.

## 2025-05-08 NOTE — CONSULT NOTE ADULT - CONVERSATION DETAILS
Discussion had at bedside with son present regarding code status. Patient states that in the event of cardiac arrest necessitating CPR and/or defibrillation, he would not want such measures to be taken. Patient also states that they would not want to be intubated in the event of respiratory compromise necessitating such a measure, nor would they want non-invasive ventilation. Patient confirmed DNR/DNI.  MOLST completed and placed in chart.

## 2025-05-08 NOTE — PATIENT PROFILE ADULT - NSPRESCRALCFREQ_GEN_A_NUR
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  This was treated with emergency angiogram / coil embolization by Dr. Moncada (IR).  Patient had significant hemoperitoneum and acute blood loss anemia.    I have personally reviewed the CT imaging and discussed with Radiologist Dr. Olguin, and my independent interpretation is as follows:  Large hemoperitoneum, irregularity in the pancreaticoduodenal artery on initial CTA.  There are no masses in the head of pancreas, evaluation is limited due to blood in that area.  Repeat CTA done yesterday shows improvement in the hemoperitoneum, no extravasation, coils are in good position.    We had a detailed discussion of what could be the etiology of this condition.    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    We also discussed prior EGD results - March 2021, did not show any significant pathology.    In order to completely rule out any pancreatic pathology as a cause of this, we can consider MRI pancreas, but due to presence of vascular coils, evaluation could be limited.  So an Endoscopic ultrasound could be considered for definitive evaluation of the head of pancreas.    He is recovering but does have tiredness.  First week after procedure he had abdominal pain which has now improved.  Still has some constipation.    He can return to work with limitations of no heavy lifting over 20 lbs for next 8 weeks.  I have  advised him against playing golf or performing any strenuous activities for next 6-8 weeks.    There was a concerning finding around site or prior prostatectomy, but his PSA is <0.01 so unlikely that there is any recurrence.    We will arrange for a 3 month follow up CTA to check for coil position and any other changes.                Blood    Acute blood loss anemia (ABLA)     Due to intraabdominal hemorrhage from ruptured pseudoaneurysm of the pancreatico duodenal artery.  Hemoglobin improved from 10.4 to 11.4  Received 1 U PRBC when he was in hospital.    Blood work was reviewed.  Over time hemoglobin level should normalize.    Repeat CBC and BMP in 3 months prior to next ct scan             Monthly or less

## 2025-05-08 NOTE — PATIENT PROFILE ADULT - FALL HARM RISK - HARM RISK INTERVENTIONS

## 2025-05-08 NOTE — H&P CARDIOLOGY - HISTORY OF PRESENT ILLNESS
84 y/o M w/ PMh of HTN, HLD, A-fib on Eliquis, CAD s/p stent(2013) and CHF s/p AICD presents for elective Afib ablation. Pt with Afib which is believed to contributing to his symptoms worsening shortness of breath and fluid overload. 84 y/o M w/ PMh of HTN, HLD, A-fib on Eliquis, CAD s/p stent(2013) and CHF s/p AICD presents for elective Afib ablation. Pt's Afib is believed to be contributing to his worsening shortness of breath and fluid overload.

## 2025-05-09 ENCOUNTER — INPATIENT (INPATIENT)
Facility: HOSPITAL | Age: 85
LOS: 6 days | Discharge: HOME HEALTH | DRG: 293 | End: 2025-05-16
Attending: STUDENT IN AN ORGANIZED HEALTH CARE EDUCATION/TRAINING PROGRAM | Admitting: STUDENT IN AN ORGANIZED HEALTH CARE EDUCATION/TRAINING PROGRAM
Payer: MEDICARE

## 2025-05-09 ENCOUNTER — TRANSCRIPTION ENCOUNTER (OUTPATIENT)
Age: 85
End: 2025-05-09

## 2025-05-09 VITALS
HEART RATE: 74 BPM | DIASTOLIC BLOOD PRESSURE: 68 MMHG | OXYGEN SATURATION: 98 % | TEMPERATURE: 98 F | SYSTOLIC BLOOD PRESSURE: 114 MMHG | RESPIRATION RATE: 20 BRPM

## 2025-05-09 VITALS — HEIGHT: 70 IN

## 2025-05-09 DIAGNOSIS — Z98.61 CORONARY ANGIOPLASTY STATUS: Chronic | ICD-10-CM

## 2025-05-09 DIAGNOSIS — Z98.890 OTHER SPECIFIED POSTPROCEDURAL STATES: Chronic | ICD-10-CM

## 2025-05-09 DIAGNOSIS — Z90.89 ACQUIRED ABSENCE OF OTHER ORGANS: Chronic | ICD-10-CM

## 2025-05-09 DIAGNOSIS — Z96.643 PRESENCE OF ARTIFICIAL HIP JOINT, BILATERAL: Chronic | ICD-10-CM

## 2025-05-09 DIAGNOSIS — I50.23 ACUTE ON CHRONIC SYSTOLIC (CONGESTIVE) HEART FAILURE: ICD-10-CM

## 2025-05-09 DIAGNOSIS — Z96.653 PRESENCE OF ARTIFICIAL KNEE JOINT, BILATERAL: Chronic | ICD-10-CM

## 2025-05-09 DIAGNOSIS — Z98.49 CATARACT EXTRACTION STATUS, UNSPECIFIED EYE: Chronic | ICD-10-CM

## 2025-05-09 LAB
ALBUMIN SERPL ELPH-MCNC: 3.7 G/DL — SIGNIFICANT CHANGE UP (ref 3.3–5)
ALP SERPL-CCNC: 57 U/L — SIGNIFICANT CHANGE UP (ref 40–120)
ALT FLD-CCNC: 20 U/L — SIGNIFICANT CHANGE UP (ref 12–78)
ANION GAP SERPL CALC-SCNC: 10 MMOL/L — SIGNIFICANT CHANGE UP (ref 5–17)
ANION GAP SERPL CALC-SCNC: 12 MMOL/L — SIGNIFICANT CHANGE UP (ref 7–14)
APTT BLD: 30.6 SEC — SIGNIFICANT CHANGE UP (ref 26.1–36.8)
AST SERPL-CCNC: 63 U/L — HIGH (ref 15–37)
BASE EXCESS BLDV CALC-SCNC: 2.8 MMOL/L — SIGNIFICANT CHANGE UP (ref -2–3)
BASOPHILS # BLD AUTO: 0.05 K/UL — SIGNIFICANT CHANGE UP (ref 0–0.2)
BASOPHILS NFR BLD AUTO: 0.5 % — SIGNIFICANT CHANGE UP (ref 0–2)
BILIRUB SERPL-MCNC: 0.8 MG/DL — SIGNIFICANT CHANGE UP (ref 0.2–1.2)
BUN SERPL-MCNC: 19 MG/DL — SIGNIFICANT CHANGE UP (ref 7–23)
BUN SERPL-MCNC: 30 MG/DL — HIGH (ref 7–23)
CALCIUM SERPL-MCNC: 10.6 MG/DL — HIGH (ref 8.5–10.1)
CALCIUM SERPL-MCNC: 9.7 MG/DL — SIGNIFICANT CHANGE UP (ref 8.4–10.5)
CHLORIDE SERPL-SCNC: 100 MMOL/L — SIGNIFICANT CHANGE UP (ref 96–108)
CHLORIDE SERPL-SCNC: 102 MMOL/L — SIGNIFICANT CHANGE UP (ref 98–107)
CO2 SERPL-SCNC: 23 MMOL/L — SIGNIFICANT CHANGE UP (ref 22–31)
CO2 SERPL-SCNC: 28 MMOL/L — SIGNIFICANT CHANGE UP (ref 22–31)
CREAT SERPL-MCNC: 0.95 MG/DL — SIGNIFICANT CHANGE UP (ref 0.5–1.3)
CREAT SERPL-MCNC: 1.26 MG/DL — SIGNIFICANT CHANGE UP (ref 0.5–1.3)
EGFR: 56 ML/MIN/1.73M2 — LOW
EGFR: 56 ML/MIN/1.73M2 — LOW
EGFR: 78 ML/MIN/1.73M2 — SIGNIFICANT CHANGE UP
EGFR: 78 ML/MIN/1.73M2 — SIGNIFICANT CHANGE UP
EOSINOPHIL # BLD AUTO: 0.74 K/UL — HIGH (ref 0–0.5)
EOSINOPHIL NFR BLD AUTO: 7.5 % — HIGH (ref 0–6)
FLUAV AG NPH QL: SIGNIFICANT CHANGE UP
FLUBV AG NPH QL: SIGNIFICANT CHANGE UP
GAS PNL BLDV: SIGNIFICANT CHANGE UP
GLUCOSE SERPL-MCNC: 108 MG/DL — HIGH (ref 70–99)
GLUCOSE SERPL-MCNC: 113 MG/DL — HIGH (ref 70–99)
HCO3 BLDV-SCNC: 31 MMOL/L — HIGH (ref 22–29)
HCT VFR BLD CALC: 43.2 % — SIGNIFICANT CHANGE UP (ref 39–50)
HCT VFR BLD CALC: 47.5 % — SIGNIFICANT CHANGE UP (ref 39–50)
HGB BLD-MCNC: 14.5 G/DL — SIGNIFICANT CHANGE UP (ref 13–17)
HGB BLD-MCNC: 15.6 G/DL — SIGNIFICANT CHANGE UP (ref 13–17)
IMM GRANULOCYTES # BLD AUTO: 0.02 K/UL — SIGNIFICANT CHANGE UP (ref 0–0.07)
IMM GRANULOCYTES NFR BLD AUTO: 0.2 % — SIGNIFICANT CHANGE UP (ref 0–0.9)
INR BLD: 1.13 RATIO — SIGNIFICANT CHANGE UP (ref 0.85–1.16)
LACTATE SERPL-SCNC: 3 MMOL/L — HIGH (ref 0.7–2)
LYMPHOCYTES # BLD AUTO: 1.17 K/UL — SIGNIFICANT CHANGE UP (ref 1–3.3)
LYMPHOCYTES NFR BLD AUTO: 11.9 % — LOW (ref 13–44)
MAGNESIUM SERPL-MCNC: 1.9 MG/DL — SIGNIFICANT CHANGE UP (ref 1.6–2.6)
MCHC RBC-ENTMCNC: 32.4 PG — SIGNIFICANT CHANGE UP (ref 27–34)
MCHC RBC-ENTMCNC: 32.8 G/DL — SIGNIFICANT CHANGE UP (ref 32–36)
MCHC RBC-ENTMCNC: 32.9 PG — SIGNIFICANT CHANGE UP (ref 27–34)
MCHC RBC-ENTMCNC: 33.6 G/DL — SIGNIFICANT CHANGE UP (ref 32–36)
MCV RBC AUTO: 98 FL — SIGNIFICANT CHANGE UP (ref 80–100)
MCV RBC AUTO: 98.8 FL — SIGNIFICANT CHANGE UP (ref 80–100)
MONOCYTES # BLD AUTO: 0.95 K/UL — HIGH (ref 0–0.9)
MONOCYTES NFR BLD AUTO: 9.7 % — SIGNIFICANT CHANGE UP (ref 2–14)
NEUTROPHILS # BLD AUTO: 6.89 K/UL — SIGNIFICANT CHANGE UP (ref 1.8–7.4)
NEUTROPHILS NFR BLD AUTO: 70.2 % — SIGNIFICANT CHANGE UP (ref 43–77)
NRBC # BLD AUTO: 0 K/UL — SIGNIFICANT CHANGE UP (ref 0–0)
NRBC # BLD AUTO: 0 K/UL — SIGNIFICANT CHANGE UP (ref 0–0)
NRBC # FLD: 0 K/UL — SIGNIFICANT CHANGE UP (ref 0–0)
NRBC # FLD: 0 K/UL — SIGNIFICANT CHANGE UP (ref 0–0)
NRBC BLD AUTO-RTO: 0 /100 WBCS — SIGNIFICANT CHANGE UP (ref 0–0)
NRBC BLD AUTO-RTO: 0 /100 WBCS — SIGNIFICANT CHANGE UP (ref 0–0)
NT-PROBNP SERPL-SCNC: 1804 PG/ML — HIGH (ref 0–450)
PCO2 BLDV: 61 MMHG — HIGH (ref 42–55)
PH BLDV: 7.31 — LOW (ref 7.32–7.43)
PHOSPHATE SERPL-MCNC: 4.4 MG/DL — SIGNIFICANT CHANGE UP (ref 2.5–4.5)
PLATELET # BLD AUTO: 187 K/UL — SIGNIFICANT CHANGE UP (ref 150–400)
PLATELET # BLD AUTO: 220 K/UL — SIGNIFICANT CHANGE UP (ref 150–400)
PMV BLD: 9.7 FL — SIGNIFICANT CHANGE UP (ref 7–13)
PO2 BLDV: 35 MMHG — SIGNIFICANT CHANGE UP (ref 25–45)
POTASSIUM SERPL-MCNC: 4.3 MMOL/L — SIGNIFICANT CHANGE UP (ref 3.5–5.3)
POTASSIUM SERPL-MCNC: 4.7 MMOL/L — SIGNIFICANT CHANGE UP (ref 3.5–5.3)
POTASSIUM SERPL-SCNC: 4.3 MMOL/L — SIGNIFICANT CHANGE UP (ref 3.5–5.3)
POTASSIUM SERPL-SCNC: 4.7 MMOL/L — SIGNIFICANT CHANGE UP (ref 3.5–5.3)
PROT SERPL-MCNC: 7.6 GM/DL — SIGNIFICANT CHANGE UP (ref 6–8.3)
PROTHROM AB SERPL-ACNC: 13 SEC — SIGNIFICANT CHANGE UP (ref 9.9–13.4)
RBC # BLD: 4.41 M/UL — SIGNIFICANT CHANGE UP (ref 4.2–5.8)
RBC # BLD: 4.81 M/UL — SIGNIFICANT CHANGE UP (ref 4.2–5.8)
RBC # FLD: 13.5 % — SIGNIFICANT CHANGE UP (ref 10.3–14.5)
RBC # FLD: 13.8 % — SIGNIFICANT CHANGE UP (ref 10.3–14.5)
RSV RNA NPH QL NAA+NON-PROBE: SIGNIFICANT CHANGE UP
SAO2 % BLDV: 63 % — LOW (ref 67–88)
SARS-COV-2 RNA SPEC QL NAA+PROBE: SIGNIFICANT CHANGE UP
SODIUM SERPL-SCNC: 137 MMOL/L — SIGNIFICANT CHANGE UP (ref 135–145)
SODIUM SERPL-SCNC: 138 MMOL/L — SIGNIFICANT CHANGE UP (ref 135–145)
SOURCE RESPIRATORY: SIGNIFICANT CHANGE UP
TROPONIN I, HIGH SENSITIVITY RESULT: 8735.25 NG/L — HIGH
WBC # BLD: 9.29 K/UL — SIGNIFICANT CHANGE UP (ref 3.8–10.5)
WBC # BLD: 9.82 K/UL — SIGNIFICANT CHANGE UP (ref 3.8–10.5)
WBC # FLD AUTO: 9.29 K/UL — SIGNIFICANT CHANGE UP (ref 3.8–10.5)
WBC # FLD AUTO: 9.82 K/UL — SIGNIFICANT CHANGE UP (ref 3.8–10.5)

## 2025-05-09 PROCEDURE — 80048 BASIC METABOLIC PNL TOTAL CA: CPT

## 2025-05-09 PROCEDURE — 85610 PROTHROMBIN TIME: CPT

## 2025-05-09 PROCEDURE — 86431 RHEUMATOID FACTOR QUANT: CPT

## 2025-05-09 PROCEDURE — 83036 HEMOGLOBIN GLYCOSYLATED A1C: CPT

## 2025-05-09 PROCEDURE — 86235 NUCLEAR ANTIGEN ANTIBODY: CPT

## 2025-05-09 PROCEDURE — 83605 ASSAY OF LACTIC ACID: CPT

## 2025-05-09 PROCEDURE — P9047: CPT | Mod: JZ

## 2025-05-09 PROCEDURE — 88185 FLOWCYTOMETRY/TC ADD-ON: CPT

## 2025-05-09 PROCEDURE — 86147 CARDIOLIPIN ANTIBODY EA IG: CPT

## 2025-05-09 PROCEDURE — 85730 THROMBOPLASTIN TIME PARTIAL: CPT

## 2025-05-09 PROCEDURE — 85025 COMPLETE CBC W/AUTO DIFF WBC: CPT

## 2025-05-09 PROCEDURE — 36415 COLL VENOUS BLD VENIPUNCTURE: CPT

## 2025-05-09 PROCEDURE — 93306 TTE W/DOPPLER COMPLETE: CPT

## 2025-05-09 PROCEDURE — 86800 THYROGLOBULIN ANTIBODY: CPT

## 2025-05-09 PROCEDURE — 84182 PROTEIN WESTERN BLOT TEST: CPT

## 2025-05-09 PROCEDURE — 86038 ANTINUCLEAR ANTIBODIES: CPT

## 2025-05-09 PROCEDURE — 84100 ASSAY OF PHOSPHORUS: CPT

## 2025-05-09 PROCEDURE — 71045 X-RAY EXAM CHEST 1 VIEW: CPT | Mod: 26

## 2025-05-09 PROCEDURE — 71250 CT THORAX DX C-: CPT

## 2025-05-09 PROCEDURE — 86225 DNA ANTIBODY NATIVE: CPT

## 2025-05-09 PROCEDURE — 86146 BETA-2 GLYCOPROTEIN ANTIBODY: CPT

## 2025-05-09 PROCEDURE — 86200 CCP ANTIBODY: CPT

## 2025-05-09 PROCEDURE — 93005 ELECTROCARDIOGRAM TRACING: CPT

## 2025-05-09 PROCEDURE — 83880 ASSAY OF NATRIURETIC PEPTIDE: CPT

## 2025-05-09 PROCEDURE — 99291 CRITICAL CARE FIRST HOUR: CPT

## 2025-05-09 PROCEDURE — 85027 COMPLETE CBC AUTOMATED: CPT

## 2025-05-09 PROCEDURE — 86039 ANTINUCLEAR ANTIBODIES (ANA): CPT

## 2025-05-09 PROCEDURE — 88184 FLOWCYTOMETRY/ TC 1 MARKER: CPT

## 2025-05-09 PROCEDURE — 82550 ASSAY OF CK (CPK): CPT

## 2025-05-09 PROCEDURE — 84484 ASSAY OF TROPONIN QUANT: CPT

## 2025-05-09 PROCEDURE — 80053 COMPREHEN METABOLIC PANEL: CPT

## 2025-05-09 PROCEDURE — 83516 IMMUNOASSAY NONANTIBODY: CPT

## 2025-05-09 PROCEDURE — 86376 MICROSOMAL ANTIBODY EACH: CPT

## 2025-05-09 PROCEDURE — 94640 AIRWAY INHALATION TREATMENT: CPT

## 2025-05-09 PROCEDURE — 80061 LIPID PANEL: CPT

## 2025-05-09 PROCEDURE — 83735 ASSAY OF MAGNESIUM: CPT

## 2025-05-09 PROCEDURE — 93010 ELECTROCARDIOGRAM REPORT: CPT

## 2025-05-09 RX ORDER — NITROGLYCERIN 20 MG/G
1 OINTMENT TOPICAL ONCE
Refills: 0 | Status: COMPLETED | OUTPATIENT
Start: 2025-05-09 | End: 2025-05-09

## 2025-05-09 RX ORDER — FUROSEMIDE 10 MG/ML
80 INJECTION INTRAMUSCULAR; INTRAVENOUS ONCE
Refills: 0 | Status: COMPLETED | OUTPATIENT
Start: 2025-05-09 | End: 2025-05-09

## 2025-05-09 RX ORDER — APIXABAN 2.5 MG/1
5 TABLET, FILM COATED ORAL ONCE
Refills: 0 | Status: COMPLETED | OUTPATIENT
Start: 2025-05-09 | End: 2025-05-09

## 2025-05-09 RX ORDER — APIXABAN 2.5 MG/1
5 TABLET, FILM COATED ORAL EVERY 12 HOURS
Refills: 0 | Status: DISCONTINUED | OUTPATIENT
Start: 2025-05-09 | End: 2025-05-09

## 2025-05-09 RX ADMIN — CARVEDILOL 25 MILLIGRAM(S): 3.12 TABLET, FILM COATED ORAL at 05:04

## 2025-05-09 RX ADMIN — Medication 10 MILLIGRAM(S): at 05:03

## 2025-05-09 RX ADMIN — FUROSEMIDE 80 MILLIGRAM(S): 10 INJECTION INTRAMUSCULAR; INTRAVENOUS at 20:57

## 2025-05-09 RX ADMIN — Medication 40 MILLIGRAM(S): at 05:03

## 2025-05-09 RX ADMIN — APIXABAN 5 MILLIGRAM(S): 2.5 TABLET, FILM COATED ORAL at 23:09

## 2025-05-09 RX ADMIN — APIXABAN 5 MILLIGRAM(S): 2.5 TABLET, FILM COATED ORAL at 08:43

## 2025-05-09 RX ADMIN — NITROGLYCERIN 1 INCH(S): 20 OINTMENT TOPICAL at 20:54

## 2025-05-09 RX ADMIN — Medication 3 MILLILITER(S): at 05:04

## 2025-05-09 RX ADMIN — Medication 25 MILLIGRAM(S): at 05:04

## 2025-05-09 NOTE — ED ADULT NURSE NOTE - NSFALLUNIVINTERV_ED_ALL_ED
Bed/Stretcher in lowest position, wheels locked, appropriate side rails in place/Call bell, personal items and telephone in reach/Instruct patient to call for assistance before getting out of bed/chair/stretcher/Non-slip footwear applied when patient is off stretcher/[place] to call system/Physically safe environment - no spills, clutter or unnecessary equipment/Purposeful proactive rounding/Room/bathroom lighting operational, light cord in reach

## 2025-05-09 NOTE — DISCHARGE NOTE NURSING/CASE MANAGEMENT/SOCIAL WORK - FINANCIAL ASSISTANCE
[facility] provides services at a reduced cost to those who are determined to be eligible through [facility]’s financial assistance program. Information regarding [facility]’s financial assistance program can be found by going to [email or web address] or by calling [phone].

## 2025-05-09 NOTE — DISCHARGE NOTE NURSING/CASE MANAGEMENT/SOCIAL WORK - NSDCPEFALRISK_GEN_ALL_CORE
For information on Fall & Injury Prevention, visit: [email or web address] OR  [email or web address] OR  https://www.cdc.gov/steadi/patient.html

## 2025-05-09 NOTE — ED ADULT NURSE NOTE - OBJECTIVE STATEMENT
pt awake alert sons at bedside right PIV placed, pt d/c from [facility] today after ablation, felt SOB at 6pm today, pt on 2 liters saturation 97 %.

## 2025-05-09 NOTE — ED PROVIDER NOTE - CRITICAL CARE ATTENDING CONTRIBUTION TO CARE
direct patient care (not related to procedure), additional history taking, interpretation of diagnostic studies, documentation, consultation with other physicians, consult w/ pt's family directly relating to pts condition  I, Dr. Jayda Mendoza DO, personally saw the patient with BEATRICE.  I have personally performed a face to face diagnostic evaluation on this patient.  I have reviewed the BEATRICE note and agree with the history, exam, and plan of care, except as noted.  I personally saw the patient and performed a substantive portion of the visit including all aspects of the medical decision making. I performed critical care of direct patient care (not related to procedure), additional history taking, interpretation of diagnostic studies, documentation, consultation with other physicians, consult w/ pt's family directly relating to pts condition. critical care EVANGELIST Hurst DO        I, Dr. Jayda Mendoza DO, personally saw the patient with BEATRICE.  I have personally performed a face to face diagnostic evaluation on this patient.  I have reviewed the BEATRICE note and agree with the history, exam, and plan of care, except as noted.  I personally saw the patient and performed a substantive portion of the visit including all aspects of the medical decision making. EVANGELIST Hurst DO

## 2025-05-09 NOTE — DISCHARGE NOTE NURSING/CASE MANAGEMENT/SOCIAL WORK - PATIENT PORTAL LINK FT
You can access the FollowMyHealth Patient Portal offered by [facility] by registering at the following website: [email or web address]. By joining [facility]’s FollowMyHealth portal, you will also be able to view your health information using other applications (apps) compatible with our system.

## 2025-05-09 NOTE — DISCHARGE NOTE PROVIDER - HOSPITAL COURSE
86 y/o M w/ PMh of HTN, HLD, A-fib on Eliquis, CAD s/p stent(2013) and CHF s/p AICD presents for elective Afib ablation    EP: s/p Afib ablation, RFV + LFV accessed and closed with vascade: no hematoma, pain, swelling, bleeding. Pulses 2+ bilaterally. + Cap refill < 2 sec  Case discussed with DR Petit and electrophsyiology         84 y/o M w/ PMh of HTN, HLD, A-fib on Eliquis, CAD s/p stent(2013) and CHF s/p AICD presents for elective Afib ablation    EP: s/p Afib ablation, RFV + LFV accessed and closed with vascade: no hematoma, pain, swelling, bleeding. Pulses 2+ bilaterally. + Cap refill < 2 sec  Case discussed with DR Petit and electrophsyiology. ROSEANNE Odonnell in one week

## 2025-05-09 NOTE — DISCHARGE NOTE PROVIDER - CARE PROVIDER_API CALL
Raphael Odonnell  Cardiac Electrophysiology  [address], [address]  [place] [address]  Phone: [phone]  Established Patient  Follow Up Time:

## 2025-05-09 NOTE — ED PROVIDER NOTE - CLINICAL SUMMARY MEDICAL DECISION MAKING FREE TEXT BOX
86 yo male with aicd, ppm, was ablated yesterday at [facility] today return to  for sob,  will get labs including troponin and EKG rule out ACS x-ray rule out CHF probable also VBG

## 2025-05-09 NOTE — ED ADULT NURSE NOTE - SCORE
Preventive Care Visit  [facility]  Daphney Rucker MD, Pediatrics  Dec 29, baby? No   What does your baby eat? Formula   Formula type [employer] brand   How does your baby baby sleep? Back    (!) SIDE    (!) TUMMY         Daphney Rucker MD  [facility]     1

## 2025-05-09 NOTE — CONSULT NOTE ADULT - SUBJECTIVE AND OBJECTIVE BOX
84 y/o M with pmhx of HTN, Afib (s/p ablation yesterday at [facility] with Dr. Odonnell, on Eliquis), CAD, CHF, PPM/DEFIB who presents to the ED c/o SOB that started 1 hour prior to arrival. Pt was resting at home when SOB started. Pt also endorsing chills. Pt was just discharged from [facility] this morning around 11 after cardiac ablation and states he was having no SOB at that time. Denies fever, CP, palpitations, abd pain, n/v, leg swelling or cramping. NKDA. Upon arrival to the ED patient with SOB and tachypneic, satting in the 90's on nasal cannu;a. Placed on BIPAP for increased work of breathing. CXR revealing b/l pulmonary edema, treated with 80 lasix and transdermal nitro. Clinical status improved. Trops 8k, pro-BNP 1800.  ICU consulted for CHF exacerbation requiring BIPAP. Upon assessment patient on BIPAP appears comfortable, satting 100% on 30% Fi02. Patient states he feels much better now. I trialed him off bipap and placed on 3L NC. Respirations unlabored RR 14-20 holding sats. Patient states he will refuse wearing BIPAP overnight because it is uncomfortable. Bedside POCUS revealing mildly diffuse B-lines b/l. Also with MOLST in place, patient is DNR/DNI. Patient can be admitted to medicine.      PAST MEDICAL & SURGICAL HISTORY:  GERD (gastroesophageal reflux disease)      CAD (coronary artery disease)      Stented coronary artery  PTCA x 1 stent -2013      HTN (hypertension)      Obesity      Hypercholesterolemia      Osteoarthritis  hip      AF (atrial fibrillation)      Aortic aneurysm, thoracic      NICM (nonischemic cardiomyopathy)      History of PTCA 1  2013 x 1 stent      S/P tonsillectomy  11 y/o      H/O bilateral hip replacements  right 1999,  2003 left  revision right THR - 2010 and 2013      H/o total knee replacement, bilateral  right 1997, left 1999      S/P cataract surgery  b/l      H/O endoscopy  2017      H/O colonoscopy  2014        Allergies    latex (Rash)  morphine (Vomiting)  neoprene - rash (Other)    Intolerances      FAMILY HISTORY:  Family history of myocardial infarction (Mother)      SOCIAL HISTORY:     Review of Systems:  CONSTITUTIONAL: No fever, chills, or fatigue.  EYES: No eye pain, visual disturbances, or discharge.  ENMT:  No difficulty hearing, tinnitus, or vertigo. No sinus or throat pain.  NECK: No pain or stiffness.  RESPIRATORY: No shortness of breath (now resolved) , cough, or wheezing.  CARDIOVASCULAR: No chest pain, palpitations, dizziness, or leg swelling.  GASTROINTESTINAL: No abdominal or epigastric pain. No nausea, vomiting,  diarrhea, or constipation. No hematemesis, melena, or hematochezia.  GENITOURINARY: No dysuria, frequency, hematuria, or incontinence.  NEUROLOGICAL: No headaches, memory loss, loss of strength, numbness, or tremors.  SKIN: No itching, burning, rashes, or lesions.  MUSCULOSKELETAL: No joint pain or swelling; No muscle, back, or extremity pain.  PSYCHIATRIC: No depression, anxiety, mood swings, or difficulty sleeping.    Medications:            apixaban 5 milliGRAM(s) Oral Once                        ICU Vital Signs Last 24 Hrs  T(C): 36.8 (09 May 2025 19:20), Max: 36.8 (09 May 2025 19:20)  T(F): 98.3 (09 May 2025 19:20), Max: 98.3 (09 May 2025 19:20)  HR: 81 (09 May 2025 20:45) (70 - 81)  BP: 103/66 (09 May 2025 21:42) (96/55 - 116/79)  BP(mean): 78 (09 May 2025 21:42) (67 - 92)  ABP: --  ABP(mean): --  RR: 25 (09 May 2025 21:42) (18 - 25)  SpO2: 99% (09 May 2025 21:42) (96% - 100%)    O2 Parameters below as of 09 May 2025 21:42  Patient On (Oxygen Delivery Method): BiPAP/CPAP    O2 Concentration (%): 30      Vital Signs Last 24 Hrs  T(C): 36.8 (09 May 2025 19:20), Max: 36.8 (09 May 2025 19:20)  T(F): 98.3 (09 May 2025 19:20), Max: 98.3 (09 May 2025 19:20)  HR: 81 (09 May 2025 20:45) (70 - 81)  BP: 103/66 (09 May 2025 21:42) (96/55 - 116/79)  BP(mean): 78 (09 May 2025 21:42) (67 - 92)  RR: 25 (09 May 2025 21:42) (18 - 25)  SpO2: 99% (09 May 2025 21:42) (96% - 100%)    Parameters below as of 09 May 2025 21:42  Patient On (Oxygen Delivery Method): BiPAP/CPAP    O2 Concentration (%): 30        I&O's Detail    09 May 2025 07:01  -  09 May 2025 22:53  --------------------------------------------------------  IN:  Total IN: 0 mL    OUT:    Voided (mL): 250 mL  Total OUT: 250 mL    Total NET: -250 mL          LABS:                        15.6   9.82  )-----------( 220      ( 09 May 2025 19:53 )             47.5     05-09    138  |  100  |  30[H]  ----------------------------<  108[H]  4.3   |  28  |  1.26    Ca    10.6[H]      09 May 2025 19:53  Phos  4.4     05-09  Mg     1.90     05-09    TPro  7.6  /  Alb  3.7  /  TBili  0.8  /  DBili  x   /  AST  63[H]  /  ALT  20  /  AlkPhos  57  05-09          CAPILLARY BLOOD GLUCOSE        PT/INR - ( 09 May 2025 19:42 )   PT: 13.0 sec;   INR: 1.13 ratio         PTT - ( 09 May 2025 19:42 )  PTT:30.6 sec  Urinalysis Basic - ( 09 May 2025 19:53 )    Color: x / Appearance: x / SG: x / pH: x  Gluc: 108 mg/dL / Ketone: x  / Bili: x / Urobili: x   Blood: x / Protein: x / Nitrite: x   Leuk Esterase: x / RBC: x / WBC x   Sq Epi: x / Non Sq Epi: x / Bacteria: x        CULTURES:      Physical Examination:    VITALS AT TIME OF EXAM:  HR:  BP:  RR:  SPO2:    General: Well appearing, lying in bed in NAD. Comfortable at rest.    HEENT: Pupils equal, reactive to light. Symmetric. No scleral icterus or injection.    PULM: Clear to auscultation B/L. No wheezes, rales, or rhonchi appreciated. No significant sputum production or increased respiratory effort.    NECK: Supple, no lymphadenopathy, trachea midline.    CVS: Regular rate and rhythm, no murmurs appreciated, +s1/s2.    ABD: Soft, nondistended, nontender    EXT: No edema, nontender.    SKIN: Warm and well perfused, no rashes noted.    NEURO: Alert, oriented, interactive, nonfocal.   84 y/o M with pmhx of HTN, Afib (s/p ablation yesterday at [facility] with Dr. Odonnell, on Eliquis), CAD, CHF, PPM/DEFIB who presents to the ED c/o SOB that started 1 hour prior to arrival. Pt was resting at home when SOB started. Pt also endorsing chills. Pt was just discharged from [facility] this morning around 11 after cardiac ablation and states he was having no SOB at that time. Denies fever, CP, palpitations, abd pain, n/v, leg swelling or cramping. NKDA. Upon arrival to the ED patient with SOB and tachypneic, satting in the 90's on nasal cannu;a. Placed on BIPAP for increased work of breathing. CXR revealing b/l pulmonary edema, treated with 80 lasix and transdermal nitro. Clinical status improved. Trops 8k, pro-BNP 1800.  ICU consulted for CHF exacerbation requiring BIPAP. Upon assessment patient on BIPAP appears comfortable, satting 100% on 30% Fi02. Patient states he feels much better now. I trialed him off bipap and placed on 3L NC. Respirations unlabored RR 14-20 holding sats. Patient states he will refuse wearing BIPAP overnight because it is uncomfortable. Bedside POCUS revealing mildly diffuse B-lines b/l. Also with MOLST in place, patient is DNR/DNI. Patient can be admitted to medicine.    .    PAST MEDICAL & SURGICAL HISTORY:  GERD (gastroesophageal reflux disease)      CAD (coronary artery disease)      Stented coronary artery  PTCA x 1 stent -2013      HTN (hypertension)      Obesity      Hypercholesterolemia      Osteoarthritis  hip      AF (atrial fibrillation)      Aortic aneurysm, thoracic      NICM (nonischemic cardiomyopathy)      History of PTCA 1  2013 x 1 stent      S/P tonsillectomy  11 y/o      H/O bilateral hip replacements  right 1999,  2003 left  revision right THR - 2010 and 2013      H/o total knee replacement, bilateral  right 1997, left 1999      S/P cataract surgery  b/l      H/O endoscopy  2017      H/O colonoscopy  2014        Allergies    latex (Rash)  morphine (Vomiting)  neoprene - rash (Other)    Intolerances      FAMILY HISTORY:  Family history of myocardial infarction (Mother)      SOCIAL HISTORY:     Review of Systems:  CONSTITUTIONAL: No fever, chills, or fatigue.  EYES: No eye pain, visual disturbances, or discharge.  ENMT:  No difficulty hearing, tinnitus, or vertigo. No sinus or throat pain.  NECK: No pain or stiffness.  RESPIRATORY: No shortness of breath (now resolved) , cough, or wheezing.  CARDIOVASCULAR: No chest pain, palpitations, dizziness, or leg swelling.  GASTROINTESTINAL: No abdominal or epigastric pain. No nausea, vomiting,  diarrhea, or constipation. No hematemesis, melena, or hematochezia.  GENITOURINARY: No dysuria, frequency, hematuria, or incontinence.  NEUROLOGICAL: No headaches, memory loss, loss of strength, numbness, or tremors.  SKIN: No itching, burning, rashes, or lesions.  MUSCULOSKELETAL: No joint pain or swelling; No muscle, back, or extremity pain.  PSYCHIATRIC: No depression, anxiety, mood swings, or difficulty sleeping.    Medications:            apixaban 5 milliGRAM(s) Oral Once                        ICU Vital Signs Last 24 Hrs  T(C): 36.8 (09 May 2025 19:20), Max: 36.8 (09 May 2025 19:20)  T(F): 98.3 (09 May 2025 19:20), Max: 98.3 (09 May 2025 19:20)  HR: 81 (09 May 2025 20:45) (70 - 81)  BP: 103/66 (09 May 2025 21:42) (96/55 - 116/79)  BP(mean): 78 (09 May 2025 21:42) (67 - 92)  ABP: --  ABP(mean): --  RR: 25 (09 May 2025 21:42) (18 - 25)  SpO2: 99% (09 May 2025 21:42) (96% - 100%)    O2 Parameters below as of 09 May 2025 21:42  Patient On (Oxygen Delivery Method): BiPAP/CPAP    O2 Concentration (%): 30      Vital Signs Last 24 Hrs  T(C): 36.8 (09 May 2025 19:20), Max: 36.8 (09 May 2025 19:20)  T(F): 98.3 (09 May 2025 19:20), Max: 98.3 (09 May 2025 19:20)  HR: 81 (09 May 2025 20:45) (70 - 81)  BP: 103/66 (09 May 2025 21:42) (96/55 - 116/79)  BP(mean): 78 (09 May 2025 21:42) (67 - 92)  RR: 25 (09 May 2025 21:42) (18 - 25)  SpO2: 99% (09 May 2025 21:42) (96% - 100%)    Parameters below as of 09 May 2025 21:42  Patient On (Oxygen Delivery Method): BiPAP/CPAP    O2 Concentration (%): 30        I&O's Detail    09 May 2025 07:01  -  09 May 2025 22:53  --------------------------------------------------------  IN:  Total IN: 0 mL    OUT:    Voided (mL): 250 mL  Total OUT: 250 mL    Total NET: -250 mL          LABS:                        15.6   9.82  )-----------( 220      ( 09 May 2025 19:53 )             47.5     05-09    138  |  100  |  30[H]  ----------------------------<  108[H]  4.3   |  28  |  1.26    Ca    10.6[H]      09 May 2025 19:53  Phos  4.4     05-09  Mg     1.90     05-09    TPro  7.6  /  Alb  3.7  /  TBili  0.8  /  DBili  x   /  AST  63[H]  /  ALT  20  /  AlkPhos  57  05-09          CAPILLARY BLOOD GLUCOSE        PT/INR - ( 09 May 2025 19:42 )   PT: 13.0 sec;   INR: 1.13 ratio         PTT - ( 09 May 2025 19:42 )  PTT:30.6 sec  Urinalysis Basic - ( 09 May 2025 19:53 )    Color: x / Appearance: x / SG: x / pH: x  Gluc: 108 mg/dL / Ketone: x  / Bili: x / Urobili: x   Blood: x / Protein: x / Nitrite: x   Leuk Esterase: x / RBC: x / WBC x   Sq Epi: x / Non Sq Epi: x / Bacteria: x        CULTURES:      Physical Examination:    VITALS AT TIME OF EXAM:  HR:  BP:  RR:  SPO2:    General: Well appearing, lying in bed in NAD. Comfortable at rest.    HEENT: Pupils equal, reactive to light. Symmetric. No scleral icterus or injection.    PULM: Clear to auscultation B/L. No wheezes, rales, or rhonchi appreciated. No significant sputum production or increased respiratory effort.    NECK: Supple, no lymphadenopathy, trachea midline.    CVS: Regular rate and rhythm, no murmurs appreciated, +s1/s2.    ABD: Soft, nondistended, nontender    EXT: No edema, nontender.    SKIN: Warm and well perfused, no rashes noted.    NEURO: Alert, oriented, interactive, nonfocal.   86 y/o M with pmhx of HTN, Afib (s/p ablation yesterday at [facility] with Dr. Odonnell, on Eliquis), CAD, CHF, PPM/DEFIB who presents to the ED c/o SOB that started 1 hour prior to arrival. Pt was resting at home when SOB started. Pt also endorsing chills. Pt was just discharged from [facility] this morning around 11 after cardiac ablation and states he was having no SOB at that time. Denies fever, CP, palpitations, abd pain, n/v, leg swelling or cramping. NKDA. Upon arrival to the ED patient with SOB and tachypneic, satting in the 90's on nasal cannu;a. Placed on BIPAP for increased work of breathing. CXR revealing b/l pulmonary edema, treated with 80 lasix and transdermal nitro. Clinical status improved. Trops 8k, pro-BNP 1800.  ICU consulted for CHF exacerbation requiring BIPAP. Upon assessment patient on BIPAP appears comfortable, satting 100% on 30% Fi02. Patient states he feels much better now, denies CP and SOB. I trialed him off bipap and placed on 3L NC. Respirations unlabored RR 14-20 holding sats. Patient states he will refuse wearing BIPAP overnight because it is uncomfortable. Bedside POCUS revealing mildly diffuse B-lines b/l. Also with MOLST in place, patient is DNR/DNI. Patient can be admitted to medicine.    PAST MEDICAL & SURGICAL HISTORY:  GERD (gastroesophageal reflux disease)      CAD (coronary artery disease)      Stented coronary artery  PTCA x 1 stent -2013      HTN (hypertension)      Obesity      Hypercholesterolemia      Osteoarthritis  hip      AF (atrial fibrillation)      Aortic aneurysm, thoracic      NICM (nonischemic cardiomyopathy)      History of PTCA 1  2013 x 1 stent      S/P tonsillectomy  11 y/o      H/O bilateral hip replacements  right 1999,  2003 left  revision right THR - 2010 and 2013      H/o total knee replacement, bilateral  right 1997, left 1999      S/P cataract surgery  b/l      H/O endoscopy  2017      H/O colonoscopy  2014        Allergies    latex (Rash)  morphine (Vomiting)  neoprene - rash (Other)    Intolerances      FAMILY HISTORY:  Family history of myocardial infarction (Mother)      SOCIAL HISTORY:     Review of Systems:  CONSTITUTIONAL: No fever, chills, or fatigue.  EYES: No eye pain, visual disturbances, or discharge.  ENMT:  No difficulty hearing, tinnitus, or vertigo. No sinus or throat pain.  NECK: No pain or stiffness.  RESPIRATORY: No shortness of breath (now resolved) , cough, or wheezing.  CARDIOVASCULAR: No chest pain, palpitations, dizziness, or leg swelling.  GASTROINTESTINAL: No abdominal or epigastric pain. No nausea, vomiting,  diarrhea, or constipation. No hematemesis, melena, or hematochezia.  GENITOURINARY: No dysuria, frequency, hematuria, or incontinence.  NEUROLOGICAL: No headaches, memory loss, loss of strength, numbness, or tremors.  SKIN: No itching, burning, rashes, or lesions.  MUSCULOSKELETAL: No joint pain or swelling; No muscle, back, or extremity pain.  PSYCHIATRIC: No depression, anxiety, mood swings, or difficulty sleeping.    Medications:            apixaban 5 milliGRAM(s) Oral Once                        ICU Vital Signs Last 24 Hrs  T(C): 36.8 (09 May 2025 19:20), Max: 36.8 (09 May 2025 19:20)  T(F): 98.3 (09 May 2025 19:20), Max: 98.3 (09 May 2025 19:20)  HR: 81 (09 May 2025 20:45) (70 - 81)  BP: 103/66 (09 May 2025 21:42) (96/55 - 116/79)  BP(mean): 78 (09 May 2025 21:42) (67 - 92)  ABP: --  ABP(mean): --  RR: 25 (09 May 2025 21:42) (18 - 25)  SpO2: 99% (09 May 2025 21:42) (96% - 100%)    O2 Parameters below as of 09 May 2025 21:42  Patient On (Oxygen Delivery Method): BiPAP/CPAP    O2 Concentration (%): 30      Vital Signs Last 24 Hrs  T(C): 36.8 (09 May 2025 19:20), Max: 36.8 (09 May 2025 19:20)  T(F): 98.3 (09 May 2025 19:20), Max: 98.3 (09 May 2025 19:20)  HR: 81 (09 May 2025 20:45) (70 - 81)  BP: 103/66 (09 May 2025 21:42) (96/55 - 116/79)  BP(mean): 78 (09 May 2025 21:42) (67 - 92)  RR: 25 (09 May 2025 21:42) (18 - 25)  SpO2: 99% (09 May 2025 21:42) (96% - 100%)    Parameters below as of 09 May 2025 21:42  Patient On (Oxygen Delivery Method): BiPAP/CPAP    O2 Concentration (%): 30        I&O's Detail    09 May 2025 07:01  -  09 May 2025 22:53  --------------------------------------------------------  IN:  Total IN: 0 mL    OUT:    Voided (mL): 250 mL  Total OUT: 250 mL    Total NET: -250 mL          LABS:                        15.6   9.82  )-----------( 220      ( 09 May 2025 19:53 )             47.5     05-09    138  |  100  |  30[H]  ----------------------------<  108[H]  4.3   |  28  |  1.26    Ca    10.6[H]      09 May 2025 19:53  Phos  4.4     05-09  Mg     1.90     05-09    TPro  7.6  /  Alb  3.7  /  TBili  0.8  /  DBili  x   /  AST  63[H]  /  ALT  20  /  AlkPhos  57  05-09          CAPILLARY BLOOD GLUCOSE        PT/INR - ( 09 May 2025 19:42 )   PT: 13.0 sec;   INR: 1.13 ratio         PTT - ( 09 May 2025 19:42 )  PTT:30.6 sec  Urinalysis Basic - ( 09 May 2025 19:53 )    Color: x / Appearance: x / SG: x / pH: x  Gluc: 108 mg/dL / Ketone: x  / Bili: x / Urobili: x   Blood: x / Protein: x / Nitrite: x   Leuk Esterase: x / RBC: x / WBC x   Sq Epi: x / Non Sq Epi: x / Bacteria: x        CULTURES:      Physical Examination:    VITALS AT TIME OF EXAM:  HR:  BP:  RR:  SPO2:    General: Well appearing, lying in bed in NAD. Comfortable at rest.    HEENT: Pupils equal, reactive to light. Symmetric. No scleral icterus or injection.    PULM: Clear to auscultation B/L. No wheezes, rales, or rhonchi appreciated. No significant sputum production or increased respiratory effort.    NECK: Supple, no lymphadenopathy, trachea midline.    CVS: Regular rate and rhythm, no murmurs appreciated, +s1/s2.    ABD: Soft, nondistended, nontender    EXT: No edema, nontender.    SKIN: Warm and well perfused, no rashes noted.    NEURO: Alert, oriented, interactive, nonfocal.

## 2025-05-09 NOTE — DISCHARGE NOTE PROVIDER - NSDCFUADDAPPT_GEN_ALL_CORE_FT
Follow up with your cardiologist in one week at [facility] cardiology  Follow up with you electrophysiologist for wound check - see appointment letter Follow up with your cardiologist in one week at [facility] cardiology Dr Odonnell  Follow up with you electrophysiologist for wound check - see appointment letter DR Odonnell in two weeks Follow up with your cardiologist in one week at [facility] cardiology Dr Odonnell  Follow up with you electrophysiologist for wound check - see appointment letter DR Odonnell in two weeks. [facility] electrophysiololgist

## 2025-05-09 NOTE — DISCHARGE NOTE NURSING/CASE MANAGEMENT/SOCIAL WORK - NSDCFUADDAPPT_GEN_ALL_CORE_FT
Follow up with your cardiologist in one week at [facility] cardiology Dr Odonnell  Follow up with you electrophysiologist for wound check - see appointment letter DR Odonnell in two weeks

## 2025-05-09 NOTE — ED PROVIDER NOTE - CARE PLAN
Principal Discharge DX:	Acute on chronic systolic congestive heart failure  Secondary Diagnosis:	Elevated troponin level   1

## 2025-05-09 NOTE — DISCHARGE NOTE PROVIDER - NSDCMRMEDTOKEN_GEN_ALL_CORE_FT
Albuterol (Eqv-Proventil HFA) 90 mcg/inh inhalation aerosol: 2 puff(s) inhaled every 6 hours as needed for  shortness of breath and/or wheezing  carvedilol 25 mg oral tablet: 1 tab(s) orally every 12 hours  clopidogrel 75 mg oral tablet: 1 tab(s) orally once a day  Eliquis 5 mg oral tablet: 1 tab(s) orally 2 times a day  Potassium Chloride (Eqv-Klor-Con 10) 10 mEq oral tablet, extended release: 1 tab(s) orally once a day  spironolactone 25 mg oral tablet: 1 tab(s) orally once a day  torsemide 10 mg oral tablet: 1 tab(s) orally once a day  valsartan 40 mg oral tablet: 1 tab(s) orally once a day

## 2025-05-09 NOTE — DISCHARGE NOTE PROVIDER - NSDCCPCAREPLAN_GEN_ALL_CORE_FT
PRINCIPAL DISCHARGE DIAGNOSIS  Diagnosis: Afib  Assessment and Plan of Treatment: You had an ablation procedure  Continue eliquis  Follow up with electrophysiology- see appointment letter      SECONDARY DISCHARGE DIAGNOSES  Diagnosis: Chronic HFrEF (heart failure with reduced ejection fraction)  Assessment and Plan of Treatment: carvedilol 25 mg oral tablet: 1 tab(s) orally every 12 hours  Potassium Chloride (Eqv-Klor-Con 10) 10 mEq oral tablet, extended release: 1 tab(s) orally once a day  spironolactone 25 mg oral tablet: 1 tab(s) orally once a day  torsemide 10 mg oral tablet: 1 tab(s) orally once a day  valsartan 40 mg oral tablet: 1 tab(s) orally once a day    Diagnosis: CKD (chronic kidney disease)  Assessment and Plan of Treatment:     Diagnosis: HTN (hypertension)  Assessment and Plan of Treatment: low salt, low fat, low cholesterol    Diagnosis: Chronic obstructive pulmonary disease  Assessment and Plan of Treatment: Albuterol (Eqv-Proventil HFA) 90 mcg/inh inhalation aerosol: 2 puff(s) inhaled every 6 hours as needed for  shortness of breath and/or wheezing    Diagnosis: CAD S/P percutaneous coronary angioplasty  Assessment and Plan of Treatment: clopidogrel 75 mg oral tablet: 1 tab(s) orally once a day  valsartan 40 mg oral tablet: 1 tab(s) orally once a day

## 2025-05-09 NOTE — ED PROVIDER NOTE - OBJECTIVE STATEMENT
86 y/o M with pmhx of HTN, Afib (s/p ablation yesterday at [facility] with Dr. Odonnell, on Eliquis), CAD, CHF, PPM/DEFIB who presents to the ED c/o SOB that started about an hour ago. Pt was resting at home when SOB started. Pt also endorsing chills. Pt was just discharged from [facility] this morning around 11, states he was having no SOB at that time. Denies fever, CP, palpitations, abd pain, n/v, leg swelling or cramping. NKDA.

## 2025-05-09 NOTE — CONSULT NOTE ADULT - ASSESSMENT
86 y/o M with pmhx of HTN, Afib (s/p ablation yesterday at [facility] with Dr. Odonnell, on Eliquis), CAD, CHF, PPM/DEFIB who presents to the ED c/o SOB that started 1 hour prior to arrival. Pt was resting at home when SOB started. Pt also endorsing chills. Pt was just discharged from [facility] this morning around 11 after cardiac ablation and states he was having no SOB at that time. Denies fever, CP, palpitations, abd pain, n/v, leg swelling or cramping. NKDA. Upon arrival to the ED patient with SOB and tachypneic, satting in the 90's on nasal cannu;a. Placed on BIPAP for increased work of breathing. CXR revealing b/l pulmonary edema, treated with 80 lasix and transdermal nitro. Clinical status improved. Trops 8k, pro-BNP 1800.  ICU consulted for CHF exacerbation requiring BIPAP. Upon assessment patient on BIPAP appears comfortable, satting 100% on 30% Fi02. Patient states he feels much better now. I trialed him off bipap and placed on 3L NC. Respirations unlabored RR 14-20 holding sats. Patient states he will refuse wearing BIPAP overnight because it is uncomfortable. Bedside POCUS revealing mildly diffuse B-lines b/l. Also with MOLST in place, patient is DNR/DNI. Patient can be admitted to medicine.    #CHF exacerbation  #SOB  #afib s/p ablation  #pulmonary edema    -Bedside POCUS without signs of pericardial effusion. Lung exam revealing mild b-lines b/l. Patient received 80 IV lasix and transdermal nitro with clinical improvement.   -Order TTE for am  -Continue diuresis recommend 40 lasix BID  -place andrea, monitor UO  -trend trops and BNP, repeat for 0100. Consider starting heparin gtt if trops increase. May be elevated 2/2 cardiac ablation  -EKG with old LBBB  -Cardio consult  -continue eliquis 5mg BID 84 y/o M with pmhx of HTN, Afib (s/p ablation yesterday at [facility] with Dr. Odonnell, on Eliquis), CAD, CHF, PPM/DEFIB who presents to the ED c/o SOB that started 1 hour prior to arrival. Pt was resting at home when SOB started. Pt also endorsing chills. Pt was just discharged from [facility] this morning around 11 after cardiac ablation and states he was having no SOB at that time. Denies fever, CP, palpitations, abd pain, n/v, leg swelling or cramping. NKDA. Upon arrival to the ED patient with SOB and tachypneic, satting in the 90's on nasal cannu;a. Placed on BIPAP for increased work of breathing. CXR revealing b/l pulmonary edema, treated with 80 lasix and transdermal nitro. Clinical status improved. Trops 8k, pro-BNP 1800.  ICU consulted for CHF exacerbation requiring BIPAP. Upon assessment patient on BIPAP appears comfortable, satting 100% on 30% Fi02. Patient states he feels much better now. I trialed him off bipap and placed on 3L NC. Respirations unlabored RR 14-20 holding sats. Patient states he will refuse wearing BIPAP overnight because it is uncomfortable. Bedside POCUS revealing mildly diffuse B-lines b/l. Also with MOLST in place, patient is DNR/DNI. Patient can be admitted to medicine.    #CHF exacerbation  #SOB  #afib s/p ablation  #pulmonary edema    -Bedside POCUS without signs of pericardial effusion. Lung exam revealing mild b-lines b/l. Patient received 80 IV lasix and transdermal nitro with clinical improvement.   -Order TTE for am  -EKG with old LBBB. Patient without active chest pain  -Elevated trops (8k) and BNP (1800), repeat for 0100. Consider starting heparin gtt if trops increase. May be elevated 2/2 cardiac ablation  -I spoke with Cardiologist Dr. Hodges regarding elevated trops s/p ablation. He believes  -Continue diuresis recommend 40 lasix BID  -place mendez, monitor UO  -Cardio consult  -continue eliquis 5mg BID 86 y/o M with pmhx of HTN, Afib (s/p ablation yesterday at [facility] with Dr. Odonnell, on Eliquis), CAD, CHF, PPM/DEFIB who presents to the ED c/o SOB that started 1 hour prior to arrival. Pt was resting at home when SOB started. Pt also endorsing chills. Pt was just discharged from [facility] this morning around 11 after cardiac ablation and states he was having no SOB at that time. Denies fever, CP, palpitations, abd pain, n/v, leg swelling or cramping. NKDA. Upon arrival to the ED patient with SOB and tachypneic, satting in the 90's on nasal cannu;a. Placed on BIPAP for increased work of breathing. CXR revealing b/l pulmonary edema, treated with 80 lasix and transdermal nitro. Clinical status improved. Trops 8k, pro-BNP 1800.  ICU consulted for CHF exacerbation requiring BIPAP. Upon assessment patient on BIPAP appears comfortable, satting 100% on 30% Fi02. Patient states he feels much better now. I trialed him off bipap and placed on 3L NC. Respirations unlabored RR 14-20 holding sats. Patient states he will refuse wearing BIPAP overnight because it is uncomfortable. Bedside POCUS revealing mildly diffuse B-lines b/l. Also with MOLST in place, patient is DNR/DNI. Patient can be admitted to medicine.    #CHF exacerbation  #SOB  #afib s/p ablation  #pulmonary edema    -Bedside POCUS without signs of pericardial effusion. Lung exam revealing mild b-lines b/l. Patient received 80 IV lasix and transdermal nitro with clinical improvement.   -Order TTE for am  -EKG with old LBBB. Patient without active chest pain, denying SOB  -Elevated trops (8k) and BNP (1800), repeat for 0100. Likely 2/2 elevated cardiac ablation  -I spoke with Cardiologist Dr. Hodges regarding elevated trops s/p ablation. He believes the elevated trops can be attributed to ablation and patient is ok to go to tele as long as there is no active chest pain or ST elevations on EKG. Also recs to trend the troponins.  -Continue diuresis recommend 40 lasix BID  -Strict I's and O's  -Cardio consult  -continue eliquis 5mg BID    Time spent on this patient encounter, which includes documenting this note in the electronic medical record, was 65 minutes including assessing the presenting problems with associated risks, reviewing the medical record to prepare for the encounter, and meeting face to face with the patient to obtain additional history. I have also performed an appropriate physical exam, made interventions listed and ordered and interpreted appropriate diagnostic studies as documented. To improve  communication and patient safety, I have coordinated care with the multidisciplinary team including the bedside nurse, appropriate attending of record and consultants as needed.    Date of entry of this note is equal to the date of services rendered    Plan discussed with Dr. Toro Intensivist and Dr. Hodges Cardiologist who agrees with plan of care

## 2025-05-09 NOTE — ED PROVIDER NOTE - PROGRESS NOTE DETAILS
called cardiologist on call Dr Hodges, for pt with chf on bipap, spoke with  critical care Paulino Cleary for  critical consult , will call Dr Cassidy from [facility] on call office not Dr Montes. updated pt EVANGELIST Reich DO spoke with Dr Roman cardiologist from [facility] on call fro Dr Cassidy agrees pt should go to ccu overnight  for admission and he will be in to see in the morning B Natali EPPS spoke with icu pa pt now off bipap andrew mon Dr via teams will admit EVANGELIST Hurst DO spoke with Dr Roman cardiologist from [facility] on call fro Dr Cassidy agrees pt does not have to have heparin as he is on eliquis and should go to ccu overnight  for admission and he will be in to see in the morning B Natali EPPS spoke with icu maribel Cleary,  pt now off bipap, pocus negative at bedside, no pericardial effusion.  paul grant, andrew Mendoza via teams will admit EVANGELIST Hurst DO

## 2025-05-09 NOTE — ED ADULT TRIAGE NOTE - CHIEF COMPLAINT QUOTE
pt presents to ED s/p cardiac ablation at [facility] yesterday, c/o SOB at rest beginning approx 30min PTA. spo2-97% in triage, sent for STAT EKG.

## 2025-05-09 NOTE — ED PROVIDER NOTE - PHYSICAL EXAMINATION
Gen:  elderly appearing,  in NAD  Head:  NC/AT  HEENT: pupils perrl,no pharyngeal erythema, uvula midline  Cardiac: S1S2, RRR  Abd: Soft, non tender  Resp: No distress, CTA   musculoskeletal:: no deformities, no swelling, strength +5/+5  Skin: warm and dry as visualized, no rashes  Neuro: MOSHER, aao x 4  Psych:alert, cooperative, appropriate mood and affect for situation

## 2025-05-09 NOTE — CHART NOTE - NSCHARTNOTEFT_GEN_A_CORE
FALLON HUNTER  [id number]    Patient status post ablation via bilateral groin access. Site clean, dry, and intact. No hematoma or active bleeding. No blood noted on gauze and no evidence of active bleeding noted. Extremities warm to touch. Pulses present b/l, capillary refill appropriate. No bruits noted on ausculation of femoral artery. Denies any pain, numbness or tingling. Will continue to monitor.    T(C): 36.4 (05-08-25 @ 20:35), Max: 36.7 (05-08-25 @ 19:00)  HR: 77 (05-08-25 @ 20:35) (76 - 90)  BP: 114/75 (05-08-25 @ 20:35) (114/73 - 141/82)  RR: 22 (05-08-25 @ 20:35) (14 - 24)  SpO2: 96% (05-08-25 @ 20:35) (91% - 97%)    Javy Larsen PA-C  Department of Medicine - Night Coverage  [facility]   In House Pager [phone]
ELECTROPHYSIOLOGY    Patient s/p atrial fibrillation ablation. Tolerated the procedure well. No immediate complications.   Vital signs stable.  Telemetry: Sinus rhythm 50-60's with 1st degree AVB and BBB. No evidence of atrial fibrillation.   Bilateral groin dressings removed. No bleeding, ecchymosis or hematoma.   Post procedure ablation teaching done.   Written instructions and contact information provided.   All questions answered.   He has a follow-up appointment with Dr. Odonnell in 2 weeks.   For discharge home today.

## 2025-05-09 NOTE — ED ADULT NURSE NOTE - CHIEF COMPLAINT QUOTE
pt presents to ED s/p cardiac ablation at [facility] yesterday, c/o SOB at rest beginning approx 30min PTA. spo2-97% in triage, sent for STAT EKG.

## 2025-05-10 ENCOUNTER — RESULT REVIEW (OUTPATIENT)
Age: 85
End: 2025-05-10

## 2025-05-10 LAB
A1C WITH ESTIMATED AVERAGE GLUCOSE RESULT: 5.6 % — SIGNIFICANT CHANGE UP (ref 4–5.6)
ALBUMIN SERPL ELPH-MCNC: 3.1 G/DL — LOW (ref 3.3–5)
ALP SERPL-CCNC: 47 U/L — SIGNIFICANT CHANGE UP (ref 40–120)
ALT FLD-CCNC: 16 U/L — SIGNIFICANT CHANGE UP (ref 12–78)
ANION GAP SERPL CALC-SCNC: 6 MMOL/L — SIGNIFICANT CHANGE UP (ref 5–17)
APTT BLD: 30.6 SEC — SIGNIFICANT CHANGE UP (ref 26.1–36.8)
AST SERPL-CCNC: 59 U/L — HIGH (ref 15–37)
BASOPHILS # BLD AUTO: 0.08 K/UL — SIGNIFICANT CHANGE UP (ref 0–0.2)
BASOPHILS NFR BLD AUTO: 0.8 % — SIGNIFICANT CHANGE UP (ref 0–2)
BILIRUB SERPL-MCNC: 0.5 MG/DL — SIGNIFICANT CHANGE UP (ref 0.2–1.2)
BUN SERPL-MCNC: 34 MG/DL — HIGH (ref 7–23)
CALCIUM SERPL-MCNC: 9.8 MG/DL — SIGNIFICANT CHANGE UP (ref 8.5–10.1)
CHLORIDE SERPL-SCNC: 103 MMOL/L — SIGNIFICANT CHANGE UP (ref 96–108)
CHOLEST SERPL-MCNC: 186 MG/DL — SIGNIFICANT CHANGE UP
CK SERPL-CCNC: 203 U/L — SIGNIFICANT CHANGE UP (ref 26–308)
CO2 SERPL-SCNC: 29 MMOL/L — SIGNIFICANT CHANGE UP (ref 22–31)
CREAT SERPL-MCNC: 1.21 MG/DL — SIGNIFICANT CHANGE UP (ref 0.5–1.3)
EGFR: 59 ML/MIN/1.73M2 — LOW
EGFR: 59 ML/MIN/1.73M2 — LOW
EOSINOPHIL # BLD AUTO: 1.21 K/UL — HIGH (ref 0–0.5)
EOSINOPHIL NFR BLD AUTO: 12.4 % — HIGH (ref 0–6)
ESTIMATED AVERAGE GLUCOSE: 114 MG/DL — SIGNIFICANT CHANGE UP (ref 68–114)
GLUCOSE SERPL-MCNC: 91 MG/DL — SIGNIFICANT CHANGE UP (ref 70–99)
HCT VFR BLD CALC: 41.4 % — SIGNIFICANT CHANGE UP (ref 39–50)
HDLC SERPL-MCNC: 46 MG/DL — SIGNIFICANT CHANGE UP
HGB BLD-MCNC: 13.5 G/DL — SIGNIFICANT CHANGE UP (ref 13–17)
IMM GRANULOCYTES # BLD AUTO: 0.04 K/UL — SIGNIFICANT CHANGE UP (ref 0–0.07)
IMM GRANULOCYTES NFR BLD AUTO: 0.4 % — SIGNIFICANT CHANGE UP (ref 0–0.9)
INR BLD: 1.22 RATIO — HIGH (ref 0.85–1.16)
LACTATE SERPL-SCNC: 1.9 MMOL/L — SIGNIFICANT CHANGE UP (ref 0.7–2)
LDLC SERPL-MCNC: 122 MG/DL — HIGH
LIPID PNL WITH DIRECT LDL SERPL: 122 MG/DL — HIGH
LYMPHOCYTES # BLD AUTO: 1.21 K/UL — SIGNIFICANT CHANGE UP (ref 1–3.3)
LYMPHOCYTES NFR BLD AUTO: 12.4 % — LOW (ref 13–44)
MCHC RBC-ENTMCNC: 32.5 PG — SIGNIFICANT CHANGE UP (ref 27–34)
MCHC RBC-ENTMCNC: 32.6 G/DL — SIGNIFICANT CHANGE UP (ref 32–36)
MCV RBC AUTO: 99.8 FL — SIGNIFICANT CHANGE UP (ref 80–100)
MONOCYTES # BLD AUTO: 1.07 K/UL — HIGH (ref 0–0.9)
MONOCYTES NFR BLD AUTO: 10.9 % — SIGNIFICANT CHANGE UP (ref 2–14)
NEUTROPHILS # BLD AUTO: 6.18 K/UL — SIGNIFICANT CHANGE UP (ref 1.8–7.4)
NEUTROPHILS NFR BLD AUTO: 63.1 % — SIGNIFICANT CHANGE UP (ref 43–77)
NONHDLC SERPL-MCNC: 140 MG/DL — HIGH
NRBC # BLD AUTO: 0 K/UL — SIGNIFICANT CHANGE UP (ref 0–0)
NRBC # FLD: 0 K/UL — SIGNIFICANT CHANGE UP (ref 0–0)
NRBC BLD AUTO-RTO: 0 /100 WBCS — SIGNIFICANT CHANGE UP (ref 0–0)
NT-PROBNP SERPL-SCNC: 1529 PG/ML — HIGH (ref 0–450)
PLATELET # BLD AUTO: 198 K/UL — SIGNIFICANT CHANGE UP (ref 150–400)
PMV BLD: 9.7 FL — SIGNIFICANT CHANGE UP (ref 7–13)
POTASSIUM SERPL-MCNC: 4.1 MMOL/L — SIGNIFICANT CHANGE UP (ref 3.5–5.3)
POTASSIUM SERPL-SCNC: 4.1 MMOL/L — SIGNIFICANT CHANGE UP (ref 3.5–5.3)
PROT SERPL-MCNC: 6.4 GM/DL — SIGNIFICANT CHANGE UP (ref 6–8.3)
PROTHROM AB SERPL-ACNC: 14.4 SEC — HIGH (ref 9.9–13.4)
RBC # BLD: 4.15 M/UL — LOW (ref 4.2–5.8)
RBC # FLD: 13.8 % — SIGNIFICANT CHANGE UP (ref 10.3–14.5)
SODIUM SERPL-SCNC: 138 MMOL/L — SIGNIFICANT CHANGE UP (ref 135–145)
TRIGL SERPL-MCNC: 99 MG/DL — SIGNIFICANT CHANGE UP
TROPONIN I, HIGH SENSITIVITY RESULT: HIGH NG/L
WBC # BLD: 9.79 K/UL — SIGNIFICANT CHANGE UP (ref 3.8–10.5)
WBC # FLD AUTO: 9.79 K/UL — SIGNIFICANT CHANGE UP (ref 3.8–10.5)

## 2025-05-10 PROCEDURE — 93306 TTE W/DOPPLER COMPLETE: CPT | Mod: 26

## 2025-05-10 PROCEDURE — 99223 1ST HOSP IP/OBS HIGH 75: CPT

## 2025-05-10 PROCEDURE — 12345: CPT | Mod: NC

## 2025-05-10 PROCEDURE — 93010 ELECTROCARDIOGRAM REPORT: CPT

## 2025-05-10 RX ORDER — APIXABAN 2.5 MG/1
5 TABLET, FILM COATED ORAL
Refills: 0 | Status: DISCONTINUED | OUTPATIENT
Start: 2025-05-10 | End: 2025-05-16

## 2025-05-10 RX ORDER — SPIRONOLACTONE 25 MG
25 TABLET ORAL DAILY
Refills: 0 | Status: DISCONTINUED | OUTPATIENT
Start: 2025-05-10 | End: 2025-05-14

## 2025-05-10 RX ORDER — FUROSEMIDE 10 MG/ML
40 INJECTION INTRAMUSCULAR; INTRAVENOUS DAILY
Refills: 0 | Status: DISCONTINUED | OUTPATIENT
Start: 2025-05-10 | End: 2025-05-12

## 2025-05-10 RX ORDER — ASPIRIN 325 MG
324 TABLET ORAL ONCE
Refills: 0 | Status: COMPLETED | OUTPATIENT
Start: 2025-05-10 | End: 2025-05-10

## 2025-05-10 RX ORDER — CLOPIDOGREL BISULFATE 75 MG/1
75 TABLET, FILM COATED ORAL DAILY
Refills: 0 | Status: DISCONTINUED | OUTPATIENT
Start: 2025-05-10 | End: 2025-05-16

## 2025-05-10 RX ORDER — ALBUTEROL SULFATE 2.5 MG/3ML
2 VIAL, NEBULIZER (ML) INHALATION EVERY 6 HOURS
Refills: 0 | Status: DISCONTINUED | OUTPATIENT
Start: 2025-05-10 | End: 2025-05-16

## 2025-05-10 RX ORDER — CARVEDILOL 3.12 MG/1
25 TABLET, FILM COATED ORAL EVERY 12 HOURS
Refills: 0 | Status: DISCONTINUED | OUTPATIENT
Start: 2025-05-10 | End: 2025-05-15

## 2025-05-10 RX ADMIN — Medication 40 MILLIGRAM(S): at 10:15

## 2025-05-10 RX ADMIN — Medication 10 MILLIEQUIVALENT(S): at 10:15

## 2025-05-10 RX ADMIN — CARVEDILOL 25 MILLIGRAM(S): 3.12 TABLET, FILM COATED ORAL at 10:09

## 2025-05-10 RX ADMIN — APIXABAN 5 MILLIGRAM(S): 2.5 TABLET, FILM COATED ORAL at 22:19

## 2025-05-10 RX ADMIN — Medication 25 MILLIGRAM(S): at 06:04

## 2025-05-10 RX ADMIN — Medication 2 PUFF(S): at 22:27

## 2025-05-10 RX ADMIN — Medication 324 MILLIGRAM(S): at 06:02

## 2025-05-10 RX ADMIN — FUROSEMIDE 40 MILLIGRAM(S): 10 INJECTION INTRAMUSCULAR; INTRAVENOUS at 10:00

## 2025-05-10 RX ADMIN — CLOPIDOGREL BISULFATE 75 MILLIGRAM(S): 75 TABLET, FILM COATED ORAL at 10:15

## 2025-05-10 RX ADMIN — APIXABAN 5 MILLIGRAM(S): 2.5 TABLET, FILM COATED ORAL at 10:08

## 2025-05-10 NOTE — H&P ADULT - ASSESSMENT
84 y/o M w/ PMH of HTN, dyslipidemia, aortic aneyrysm, a-fib s/p ablation yesterday at [facility] w/ Dr. Odonnell) on Eliquis, CAD s/ PCI, chronic systolic CHF, AICD, p/w SOB    *Acute on chronic systolic CHF   -CXR: Mild to moderate CHF versus bilateral infiltrates  -IV alsix   -Last Echo 2/27/25  -I/Os + Daily weights   -On BB / ARB   -EKG: LBBB (Old)   -Cardio consult  -Tele monitoring    *Elevated troponins s/p cardiac ablation for a-fib   *H/o CAD   -As per Dr. Henriquez, she discussed case w/ cardio, who stated that patient did not need heparin drip, as he is on Eliquis. Discussed w/ ICU KELLEY Cleary regarding admitting patient to CCU for significantly elevated troponins. Madiha stated that she discussed case w/ on call cardiologist Dr Hodges, and states that he is okay to go to Tele floor.     *H/o HTN / dyslipidemia / aortic aneurysm   -C/w home meds and f/u outpatient for further management     *DVT ppx  -On Eliquis     >75 mins required for admission  86 y/o M w/ PMH of HTN, dyslipidemia, aortic aneurysm, a-fib s/p ablation yesterday at [facility] w/ Dr. Odonnell) on Eliquis, CAD s/ PCI, chronic systolic CHF, AICD, p/w SOB    *Acute on chronic systolic CHF   -CXR: Mild to moderate CHF versus bilateral infiltrates  -IV lasix   -Last Echo 2/27/25  -I/Os + Daily weights   -On BB / ARB   -Cardio consult  -Tele monitoring    *Elevated troponins s/p cardiac ablation for a-fib   *H/o CAD   -As per Dr. Henriquez, she discussed case w/ cardio, who stated that patient did not need heparin drip, as he is on Eliquis. Discussed w/ ICU KELLEY Cleary regarding admitting patient to CCU for significantly elevated troponins. Madiha stated that she discussed case w/ on call cardiologist Dr Hodges, and states that he is okay to go to Tele floor.   -EKG: LBBB (Old)   -TTE    *H/o HTN / dyslipidemia / aortic aneurysm   -C/w home meds and f/u outpatient for further management     *DVT ppx  -On Eliquis     >75 mins required for admission

## 2025-05-10 NOTE — PATIENT PROFILE ADULT - FALL HARM RISK - HARM RISK INTERVENTIONS

## 2025-05-10 NOTE — H&P ADULT - HISTORY OF PRESENT ILLNESS
84 y/o M w/ PMH of HTN, dyslipidemia, aortic aneyrysm, a-fib s/p ablation yesterday at [facility] w/ Dr. Odonnell) on Eliquis, CAD s/ PCI, chronic systolic CHF, AICD, p/w SOB. Patient states that he had a cardiac ablation 1 day prior to arrival at [facility] and was discharged from [facility] around 1pm yesterday. States that he came home and around 7pm he developed SOB. Patient came to ED, and was treated acute CHF and placed on bipap. Currently patient is off of bipap, and patient states he feels much better. Denies CP, palpitatins, nausea, vomiting, cough, runny nose, sore throat.         PSH: PCI, tonsillectomy, B/L hip replacement, B/L TKR, cataract surgery     Social Hx: Tobacco - quit 45 years ago, etoh - occasionally once per week, drugs - denies     Family Hx:  Denies pertinent hx

## 2025-05-10 NOTE — PROVIDER CONTACT NOTE (EICU) - ASSESSMENT
85 year old 1 day s/p cardiac ablation presented with sob. placed on bipap initially with good effect and now able to be weaned to NC.  trop elevated to 8000, but patient without chest pain

## 2025-05-10 NOTE — PROGRESS NOTE ADULT - ASSESSMENT
86 y/o M w/ PMH of HTN, dyslipidemia, aortic aneurysm, a-fib s/p ablation on 5/8 at [facility] w/ Dr. Odonnell) on Eliquis, CAD s/ PCI, chronic systolic CHF, AICD, p/w SOB    *Acute on chronic systolic CHF   -CXR: Mild to moderate CHF versus bilateral infiltrates  -IV lasix   -Last Echo 2/27/25  -I/Os + Daily weights   -On BB / ARB   -Cardio consult  -Tele monitoring    *Elevated troponins s/p cardiac ablation for a-fib   *H/o CAD   -As per Dr. Henriquez, she discussed case w/ cardio, who stated that patient did not need heparin drip, as he is on Eliquis. Discussed w/ ICU KELLEY Cleary regarding admitting patient to CCU for significantly elevated troponins. Madiha stated that she discussed case w/ on call cardiologist Dr Hodges, and states that he is okay to go to Tele floor.   -EKG: LBBB (Old)   -TTE    *H/o HTN / dyslipidemia / aortic aneurysm   -C/w home meds and f/u outpatient for further management     *DVT ppx  -On Eliquis

## 2025-05-10 NOTE — CONSULT NOTE ADULT - SUBJECTIVE AND OBJECTIVE BOX
CHIEF COMPLAINT:  Patient is a 85y old  Male who presents with a chief complaint of SOB (10 May 2025 01:20)      HPI:  84 y/o M w/ PMH of HTN, dyslipidemia, aortic aneyrysm, a-fib s/p ablation yesterday at [facility] w/ Dr. Odonnell) on Eliquis, CAD s/ PCI, chronic systolic CHF, AICD, p/w SOB. Patient states that he had a cardiac ablation 1 day prior to arrival at [facility] and was discharged from [facility] around 1pm yesterday. States that he came home and around 7pm he developed SOB. Patient came to ED, and was treated acute CHF and placed on bipap. Currently patient is off of bipap, and patient states he feels much better. Denies CP, palpitatins, nausea, vomiting, cough, runny nose, sore throat.         PSH: PCI, tonsillectomy, B/L hip replacement, B/L TKR, cataract surgery     Social Hx: Tobacco - quit 45 years ago, etoh - occasionally once per week, drugs - denies     Family Hx:  Denies pertinent hx      (10 May 2025 01:20)    ALLERGIES:  Allergies    latex (Rash)  morphine (Vomiting)  neoprene - rash (Other)    Intolerances      Home Medications:  Albuterol (Eqv-Proventil HFA) 90 mcg/inh inhalation aerosol: 2 puff(s) inhaled every 6 hours as needed for  shortness of breath and/or wheezing (09 May 2025 22:49)  carvedilol 25 mg oral tablet: 1 tab(s) orally every 12 hours (09 May 2025 22:49)  clopidogrel 75 mg oral tablet: 1 tab(s) orally once a day (09 May 2025 22:49)  Eliquis 5 mg oral tablet: 1 tab(s) orally 2 times a day (09 May 2025 22:49)  Potassium Chloride (Eqv-Klor-Con 10) 10 mEq oral tablet, extended release: 1 tab(s) orally once a day (09 May 2025 22:49)  spironolactone 25 mg oral tablet: 1 tab(s) orally once a day (09 May 2025 22:49)  torsemide 10 mg oral tablet: 1 tab(s) orally once a day (09 May 2025 22:49)  valsartan 40 mg oral tablet: 1 tab(s) orally once a day (09 May 2025 22:49)    PAST MEDICAL & SURGICAL HISTORY:  GERD (gastroesophageal reflux disease)      CAD (coronary artery disease)      Stented coronary artery  PTCA x 1 stent -2013      HTN (hypertension)      Obesity      Hypercholesterolemia      Osteoarthritis  hip      AF (atrial fibrillation)      Aortic aneurysm, thoracic      NICM (nonischemic cardiomyopathy)      History of PTCA 1  2013 x 1 stent      S/P tonsillectomy  13 y/o      H/O bilateral hip replacements  right 1999,  2003 left  revision right THR - 2010 and 2013      H/o total knee replacement, bilateral  right 1997, left 1999      S/P cataract surgery  b/l      H/O endoscopy  2017      H/O colonoscopy  2014            FAMILY HISTORY:  Family history of myocardial infarction (Mother)        SOCIAL HISTORY:    REVIEW OF SYSTEMS:  General:  No wt loss, fevers, chills, night sweats  Eyes:  Good vision, no reported pain  ENT:  No sore throat, pain, runny nose, dysphagia  CV:  No pain, palpitations, hypo/hypertension  Resp:  No dyspnea, cough, tachypnea, wheezing  GI:  No pain, nausea, vomiting, diarrhea, constipation  :  No pain, bleeding, incontinence, nocturia  Muscle:  No pain, weakness  Breast:  No pain, abscess, mass, discharge  Neuro:  No weakness, tingling, memory problems  Psych:  No fatigue, insomnia, mood problems, depression  Endocrine:  No polyuria, polydipsia, cold/heat intolerance  Heme:  No petechiae, ecchymosis, easy bruisability  Skin:  No rash, tattoos, scars, edema    PHYSICAL EXAM:  Vital Signs:  Vital Signs Last 24 Hrs  T(C): 36.5 (10 May 2025 08:25), Max: 36.8 (09 May 2025 19:20)  T(F): 97.7 (10 May 2025 08:25), Max: 98.3 (09 May 2025 19:20)  HR: 67 (10 May 2025 08:25) (67 - 81)  BP: 101/53 (10 May 2025 08:25) (96/55 - 116/79)  BP(mean): 64 (10 May 2025 01:03) (64 - 92)  RR: 18 (10 May 2025 08:25) (16 - 25)  SpO2: 94% (10 May 2025 08:25) (94% - 100%)    Parameters below as of 10 May 2025 08:25  Patient On (Oxygen Delivery Method): room air      I&O's Summary    09 May 2025 07:01  -  10 May 2025 07:00  --------------------------------------------------------  IN: 0 mL / OUT: 950 mL / NET: -950 mL      I&O's Detail    09 May 2025 07:01  -  10 May 2025 07:00  --------------------------------------------------------  IN:  Total IN: 0 mL    OUT:    Voided (mL): 950 mL  Total OUT: 950 mL    Total NET: -950 mL          Tele:     Constitutional: well developed, normal appearance, well groomed, well nourished, no deformities and no acute distress.   Eyes: the conjunctiva exhibited no abnormalities and the eyelids demonstrated no xanthelasmas.   HEENT: normal oral mucosa, no oral pallor and no oral cyanosis.   Neck: normal jugular venous A waves present, normal jugular venous V waves present and no jugular venous negrete A waves.   Pulmonary: no respiratory distress, normal respiratory rhythm and effort, no accessory muscle use and lungs were clear to auscultation bilaterally.   Cardiovascular: heart rate and rhythm were normal, normal S1 and S2 and no murmur, gallop, rub, heave or thrill are present.   Abdomen: soft, non-tender, no hepato-splenomegaly and no abdominal mass palpated.   Musculoskeletal: the gait could not be assessed..   Extremities: no clubbing of the fingernails, no localized cyanosis, no petechial hemorrhages and no ischemic changes.   Skin: normal skin color and pigmentation, no rash, no venous stasis, no skin lesions, no skin ulcer and no xanthoma was observed.   Psychiatric: oriented to person, place, and time, the affect was normal, the mood was normal and not feeling anxious.      LABORATORY:                          13.5   9.79  )-----------( 198      ( 10 May 2025 08:06 )             41.4     05-10    138  |  103  |  34[H]  ----------------------------<  91  4.1   |  29  |  1.21    Ca    9.8      10 May 2025 08:06  Phos  4.4     05-09  Mg     1.90     05-09    TPro  6.4  /  Alb  3.1[L]  /  TBili  0.5  /  DBili  x   /  AST  59[H]  /  ALT  16  /  AlkPhos  47  05-10          CAPILLARY BLOOD GLUCOSE        LIVER FUNCTIONS - ( 10 May 2025 08:06 )  Alb: 3.1 g/dL / Pro: 6.4 gm/dL / ALK PHOS: 47 U/L / ALT: 16 U/L / AST: 59 U/L / GGT: x           PT/INR - ( 10 May 2025 08:06 )   PT: 14.4 sec;   INR: 1.22 ratio         PTT - ( 10 May 2025 08:06 )  PTT:30.6 sec  Urinalysis Basic - ( 10 May 2025 08:06 )    Color: x / Appearance: x / SG: x / pH: x  Gluc: 91 mg/dL / Ketone: x  / Bili: x / Urobili: x   Blood: x / Protein: x / Nitrite: x   Leuk Esterase: x / RBC: x / WBC x   Sq Epi: x / Non Sq Epi: x / Bacteria: x      BNP    IMAGING:  < from: 12 Lead ECG (05.10.25 @ 08:00) >   Sinus rhythm with 1st degree A-V block  Left bundle branch block  Abnormal ECG  When compared with ECG of 09-MAY-2025 19:16,  No significant change was found    < end of copied text >      < from: Xray Chest 1 View AP/PA. (05.09.25 @ 19:57) >  ACC: [id number] EXAM:  XR CHEST 1 VIEW   ORDERED BY: RAKEL RAIN     PROCEDURE DATE:  05/09/2025          INTERPRETATION:  TECHNIQUE: Single portable view of the chest.    COMPARISON:  2/28/2025    CLINICAL HISTORY: Shortness of Breath    FINDINGS:    Single frontal view of the chest demonstrates mild to moderate CHF versus   bilateral infiltrates. Left-sided AICD. The cardiomediastinal silhouette   is enlarged. No acute osseous abnormalities. Overlying EKG leads and   wires are noted    IMPRESSION: Mild to moderate CHF versus bilateral infiltrates    < end of copied text >    ASSESSMENT:      PLAN:     MEDICATIONS  (STANDING):  apixaban 5 milliGRAM(s) Oral two times a day  carvedilol 25 milliGRAM(s) Oral every 12 hours  clopidogrel Tablet 75 milliGRAM(s) Oral daily  furosemide   Injectable 40 milliGRAM(s) IV Push daily  potassium chloride    Tablet ER 10 milliEquivalent(s) Oral daily  spironolactone 25 milliGRAM(s) Oral daily  valsartan 40 milliGRAM(s) Oral daily    MEDICATIONS  (PRN):  albuterol    90 MICROgram(s) HFA Inhaler 2 Puff(s) Inhalation every 6 hours PRN for shortness of breath and/or wheezing      Erasto Roman MD, FACC, FASE, CHANEL, FACP       CHIEF COMPLAINT:  Patient is a 85y old  Male who presents with a chief complaint of SOB (10 May 2025 01:20)      HPI:    I saw Benito today for cardiac assessments as he's in [facility] now with heart failure. He's a pleasant 85-year-old with hypertension, dyslipidemia, known aortic aneurysm, atrial fibrillation ablation a few days ago at [facility], history of ischemic heart disease and cardiomyopathy with prior PCI, systolic heart failure, and ICD, on Eliquis, admitted with shortness of breath and found to have heart failure. He was placed on BiPAP, given IV Lasix, and is diuresing well without issues.    He's seen resting comfortably in bed, no immediate distress, no current chest pain, shortness of breath, palpitations, syncope, or edema. He had significant troponin elevation, likely on the basis of demand ischemia and recent AFib ablation and not from a coronary ischemic event. Although ECG is uninterpretable, showing left bundle branch block, he does have evidence of brief bursts of non-sustained VT on telemetry, which is asymptomatic.      PSH: PCI, tonsillectomy, B/L hip replacement, B/L TKR, cataract surgery     Social Hx: Tobacco - quit 45 years ago, etoh - occasionally once per week, drugs - denies     Family Hx:  Denies pertinent hx      (10 May 2025 01:20)    ALLERGIES:  Allergies    latex (Rash)  morphine (Vomiting)  neoprene - rash (Other)    Intolerances      Home Medications:  Albuterol (Eqv-Proventil HFA) 90 mcg/inh inhalation aerosol: 2 puff(s) inhaled every 6 hours as needed for  shortness of breath and/or wheezing (09 May 2025 22:49)  carvedilol 25 mg oral tablet: 1 tab(s) orally every 12 hours (09 May 2025 22:49)  clopidogrel 75 mg oral tablet: 1 tab(s) orally once a day (09 May 2025 22:49)  Eliquis 5 mg oral tablet: 1 tab(s) orally 2 times a day (09 May 2025 22:49)  Potassium Chloride (Eqv-Klor-Con 10) 10 mEq oral tablet, extended release: 1 tab(s) orally once a day (09 May 2025 22:49)  spironolactone 25 mg oral tablet: 1 tab(s) orally once a day (09 May 2025 22:49)  torsemide 10 mg oral tablet: 1 tab(s) orally once a day (09 May 2025 22:49)  valsartan 40 mg oral tablet: 1 tab(s) orally once a day (09 May 2025 22:49)    PAST MEDICAL & SURGICAL HISTORY:  GERD (gastroesophageal reflux disease)      CAD (coronary artery disease)      Stented coronary artery  PTCA x 1 stent -2013      HTN (hypertension)      Obesity      Hypercholesterolemia      Osteoarthritis  hip      AF (atrial fibrillation)      Aortic aneurysm, thoracic      NICM (nonischemic cardiomyopathy)      History of PTCA 1  2013 x 1 stent      S/P tonsillectomy  11 y/o      H/O bilateral hip replacements  right 1999,  2003 left  revision right THR - 2010 and 2013      H/o total knee replacement, bilateral  right 1997, left 1999      S/P cataract surgery  b/l      H/O endoscopy  2017      H/O colonoscopy  2014            FAMILY HISTORY:  Family history of myocardial infarction (Mother)        SOCIAL HISTORY:  former smoker      REVIEW OF SYSTEMS:  General:  No wt loss, fevers, chills, night sweats  Eyes:  Good vision, no reported pain  ENT:  No sore throat, pain, runny nose, dysphagia  CV:  No pain, palpitations, hypo/hypertension  Resp:  No dyspnea, cough, tachypnea, wheezing  GI:  No pain, nausea, vomiting, diarrhea, constipation  :  No pain, bleeding, incontinence, nocturia  Muscle:  No pain, weakness  Breast:  No pain, abscess, mass, discharge  Neuro:  No weakness, tingling, memory problems  Psych:  No fatigue, insomnia, mood problems, depression  Endocrine:  No polyuria, polydipsia, cold/heat intolerance  Heme:  No petechiae, ecchymosis, easy bruisability  Skin:  No rash, edema    PHYSICAL EXAM:  Vital Signs:  Vital Signs Last 24 Hrs  T(C): 36.5 (10 May 2025 08:25), Max: 36.8 (09 May 2025 19:20)  T(F): 97.7 (10 May 2025 08:25), Max: 98.3 (09 May 2025 19:20)  HR: 67 (10 May 2025 08:25) (67 - 81)  BP: 101/53 (10 May 2025 08:25) (96/55 - 116/79)  BP(mean): 64 (10 May 2025 01:03) (64 - 92)  RR: 18 (10 May 2025 08:25) (16 - 25)  SpO2: 94% (10 May 2025 08:25) (94% - 100%)    Parameters below as of 10 May 2025 08:25  Patient On (Oxygen Delivery Method): room air      I&O's Summary    09 May 2025 07:01  -  10 May 2025 07:00  --------------------------------------------------------  IN: 0 mL / OUT: 950 mL / NET: -950 mL      I&O's Detail    09 May 2025 07:01  -  10 May 2025 07:00  --------------------------------------------------------  IN:  Total IN: 0 mL    OUT:    Voided (mL): 950 mL  Total OUT: 950 mL    Total NET: -950 mL          Tele: SR, NSVT    Constitutional: well developed, normal appearance, well groomed, well nourished, no deformities and no acute distress.   Eyes: the conjunctiva exhibited no abnormalities and the eyelids demonstrated no xanthelasmas.   HEENT: normal oral mucosa, no oral pallor and no oral cyanosis.   Neck: normal jugular venous A waves present, normal jugular venous V waves present and no jugular venous negrete A waves.   Pulmonary: no respiratory distress, normal respiratory rhythm and effort, no accessory muscle use and lungs were clear to auscultation bilaterally.   Cardiovascular: heart rate and rhythm were normal, normal S1 and S2 and no murmur.  Abdomen: soft, non-tender.  Musculoskeletal: the gait could not be assessed..   Extremities: no clubbing of the fingernails, no localized cyanosis, no petechial hemorrhages and no ischemic changes.   Skin: normal skin color and pigmentation, no rash, no venous stasis, no skin lesions, no skin ulcer and no xanthoma was observed.   Psychiatric: oriented to person, place, and time, the affect was normal, the mood was normal and not feeling anxious.      LABORATORY:                          13.5   9.79  )-----------( 198      ( 10 May 2025 08:06 )             41.4     05-10    138  |  103  |  34[H]  ----------------------------<  91  4.1   |  29  |  1.21    Ca    9.8      10 May 2025 08:06  Phos  4.4     05-09  Mg     1.90     05-09    TPro  6.4  /  Alb  3.1[L]  /  TBili  0.5  /  DBili  x   /  AST  59[H]  /  ALT  16  /  AlkPhos  47  05-10        LIVER FUNCTIONS - ( 10 May 2025 08:06 )  Alb: 3.1 g/dL / Pro: 6.4 gm/dL / ALK PHOS: 47 U/L / ALT: 16 U/L / AST: 59 U/L / GGT: x           PT/INR - ( 10 May 2025 08:06 )   PT: 14.4 sec;   INR: 1.22 ratio         PTT - ( 10 May 2025 08:06 )  PTT:30.6 sec  Urinalysis Basic - ( 10 May 2025 08:06 )    Color: x / Appearance: x / SG: x / pH: x  Gluc: 91 mg/dL / Ketone: x  / Bili: x / Urobili: x   Blood: x / Protein: x / Nitrite: x   Leuk Esterase: x / RBC: x / WBC x   Sq Epi: x / Non Sq Epi: x / Bacteria: x    Pro-Brain Natriuretic Peptide: 1529 pg/mL (05.10.25 @ 01:37)    Troponin I, High Sensitivity Result: 26621.10        IMAGING:  < from: 12 Lead ECG (05.10.25 @ 08:00) >   Sinus rhythm with 1st degree A-V block  Left bundle branch block  Abnormal ECG  When compared with ECG of 09-MAY-2025 19:16,  No significant change was found    < end of copied text >      < from: Xray Chest 1 View AP/PA. (05.09.25 @ 19:57) >  ACC: [id number] EXAM:  XR CHEST 1 VIEW   ORDERED BY: RAKEL RAIN     PROCEDURE DATE:  05/09/2025          INTERPRETATION:  TECHNIQUE: Single portable view of the chest.    COMPARISON:  2/28/2025    CLINICAL HISTORY: Shortness of Breath    FINDINGS:    Single frontal view of the chest demonstrates mild to moderate CHF versus   bilateral infiltrates. Left-sided AICD. The cardiomediastinal silhouette   is enlarged. No acute osseous abnormalities. Overlying EKG leads and   wires are noted    IMPRESSION: Mild to moderate CHF versus bilateral infiltrates    < end of copied text >    ASSESSMENT:  I saw Benito today for cardiac assessments as he's in [facility] now with heart failure. He's a pleasant 85-year-old with hypertension, dyslipidemia, known aortic aneurysm, atrial fibrillation ablation a few days ago at [facility], history of ischemic heart disease and cardiomyopathy with prior PCI, systolic heart failure, and ICD, on Eliquis, admitted with shortness of breath and found to have heart failure. He was placed on BiPAP, given IV Lasix, and is diuresing well without issues.    He's seen resting comfortably in bed, no immediate distress, no current chest pain, shortness of breath, palpitations, syncope, or edema. He had significant troponin elevation, likely on the basis of demand ischemia and recent AFib ablation and not from a coronary ischemic event. Although ECG is uninterpretable, showing left bundle branch block, he does have evidence of brief bursts of non-sustained VT on telemetry, which is asymptomatic.    PLAN:     He will continue on Apixaban 5 mg BID for stroke risk reduction in the setting of atrial fibrillation, status post-ablation, and maintaining sinus rhythm. He'll continue on his heart failure regimen including Carvedilol 25 mg BID, Spironolactone 25 mg daily, and Valsartan 40 mg daily as part of guideline-directed therapies. He'll continue on his Plavix 75 mg daily for ischemic heart disease management and Lasix 40 mg IV push daily for diuresis, with fluid and sodium restriction emphasized.    His lungs are clearing up and he seems to be more comfortable. We'll monitor for another day or two and hopefully discharge with outpatient cardiac monitoring if he's felt to be stable enough.    Thanks for allowing me to participate in the care of your patient. We'll follow up with you. Thank you. We'll also inform his EP cardiologist, Dr. Odonnell.    Erasto Roman MD, FACC, FASE, FASNC, FACP  [facility]

## 2025-05-10 NOTE — PROGRESS NOTE ADULT - SUBJECTIVE AND OBJECTIVE BOX
5/10: no complaints  no cp/sob      PHYSICAL EXAM:    Daily Height in cm: 177.8 (09 May 2025 19:14)    Daily     Vital Signs Last 24 Hrs  T(C): 36.7 (10 May 2025 12:30), Max: 36.8 (09 May 2025 19:20)  T(F): 98 (10 May 2025 12:30), Max: 98.3 (09 May 2025 19:20)  HR: 72 (10 May 2025 12:30) (67 - 81)  BP: 101/53 (10 May 2025 08:25) (96/55 - 116/79)  BP(mean): 64 (10 May 2025 01:03) (64 - 92)  RR: 18 (10 May 2025 12:30) (16 - 25)  SpO2: 94% (10 May 2025 12:30) (94% - 100%)    Constitutional: Weak and ill appearing  HEENT: Atraumatic, EMIL,   Respiratory: Breath Sounds normal, no rhonchi/wheeze  Cardiovascular: N S1S2;   Gastrointestinal: Abdomen soft, non tender, Bowel Sounds present  Extremities: No edema, peripheral pulses present  Neurological: AAO x 3, no gross focal motor deficits  Skin: Non cellulitic, no rash, ulcers  Lymph Nodes: No lymphadenopathy noted  Back: No CVA tenderness   Musculoskeletal: non tender  Breasts: Deferred  Genitourinary: deferred  Rectal: Deferred    All Labs/EKG/Radiology/Meds reviewed by me                          13.5   9.79  )-----------( 198      ( 10 May 2025 08:06 )             41.4       CBC Full  -  ( 10 May 2025 08:06 )  WBC Count : 9.79 K/uL  RBC Count : 4.15 M/uL  Hemoglobin : 13.5 g/dL  Hematocrit : 41.4 %  Platelet Count - Automated : 198 K/uL  Mean Cell Volume : 99.8 fl  Mean Cell Hemoglobin : 32.5 pg  Mean Cell Hemoglobin Concentration : 32.6 g/dL  Auto Neutrophil # : 6.18 K/uL  Auto Lymphocyte # : 1.21 K/uL  Auto Monocyte # : 1.07 K/uL  Auto Eosinophil # : 1.21 K/uL  Auto Basophil # : 0.08 K/uL  Auto Neutrophil % : 63.1 %  Auto Lymphocyte % : 12.4 %  Auto Monocyte % : 10.9 %  Auto Eosinophil % : 12.4 %  Auto Basophil % : 0.8 %      05-10    138  |  103  |  34[H]  ----------------------------<  91  4.1   |  29  |  1.21    Ca    9.8      10 May 2025 08:06  Phos  4.4     05-09  Mg     1.90     05-09    TPro  6.4  /  Alb  3.1[L]  /  TBili  0.5  /  DBili  x   /  AST  59[H]  /  ALT  16  /  AlkPhos  47  05-10      LIVER FUNCTIONS - ( 10 May 2025 08:06 )  Alb: 3.1 g/dL / Pro: 6.4 gm/dL / ALK PHOS: 47 U/L / ALT: 16 U/L / AST: 59 U/L / GGT: x             PT/INR - ( 10 May 2025 08:06 )   PT: 14.4 sec;   INR: 1.22 ratio         PTT - ( 10 May 2025 08:06 )  PTT:30.6 sec          Urinalysis Basic - ( 10 May 2025 08:06 )    Color: x / Appearance: x / SG: x / pH: x  Gluc: 91 mg/dL / Ketone: x  / Bili: x / Urobili: x   Blood: x / Protein: x / Nitrite: x   Leuk Esterase: x / RBC: x / WBC x   Sq Epi: x / Non Sq Epi: x / Bacteria: x            MEDICATIONS  (STANDING):  apixaban 5 milliGRAM(s) Oral two times a day  carvedilol 25 milliGRAM(s) Oral every 12 hours  clopidogrel Tablet 75 milliGRAM(s) Oral daily  furosemide   Injectable 40 milliGRAM(s) IV Push daily  potassium chloride    Tablet ER 10 milliEquivalent(s) Oral daily  spironolactone 25 milliGRAM(s) Oral daily  valsartan 40 milliGRAM(s) Oral daily    MEDICATIONS  (PRN):  albuterol    90 MICROgram(s) HFA Inhaler 2 Puff(s) Inhalation every 6 hours PRN for shortness of breath and/or wheezing

## 2025-05-10 NOTE — PROVIDER CONTACT NOTE (OTHER) - BACKGROUND
Troponin 8735.25 in ED.   Pt Hx of CHF, HTN, afib, pacemaker/defibrillator. s/p ablation 5/8 @ [facility].

## 2025-05-10 NOTE — PATIENT PROFILE ADULT - FOOD INSECURITY
Encounter addended by: Billy Marshall DO on: 12/8/2021 10:48 AM   Actions taken: Clinical Note Signed, Charge Capture section accepted no

## 2025-05-10 NOTE — PROVIDER CONTACT NOTE (EICU) - RECOMMENDATIONS
trop elevation likely 2/2 recent cardiac procedure.  trend trops. no icu interventions required at this time

## 2025-05-11 PROCEDURE — 99232 SBSQ HOSP IP/OBS MODERATE 35: CPT

## 2025-05-11 RX ADMIN — CARVEDILOL 25 MILLIGRAM(S): 3.12 TABLET, FILM COATED ORAL at 21:37

## 2025-05-11 RX ADMIN — Medication 40 MILLIGRAM(S): at 09:44

## 2025-05-11 RX ADMIN — Medication 10 MILLIEQUIVALENT(S): at 09:37

## 2025-05-11 RX ADMIN — CARVEDILOL 25 MILLIGRAM(S): 3.12 TABLET, FILM COATED ORAL at 09:37

## 2025-05-11 RX ADMIN — Medication 25 MILLIGRAM(S): at 06:44

## 2025-05-11 RX ADMIN — APIXABAN 5 MILLIGRAM(S): 2.5 TABLET, FILM COATED ORAL at 09:37

## 2025-05-11 RX ADMIN — FUROSEMIDE 40 MILLIGRAM(S): 10 INJECTION INTRAMUSCULAR; INTRAVENOUS at 09:37

## 2025-05-11 RX ADMIN — APIXABAN 5 MILLIGRAM(S): 2.5 TABLET, FILM COATED ORAL at 21:37

## 2025-05-11 RX ADMIN — CLOPIDOGREL BISULFATE 75 MILLIGRAM(S): 75 TABLET, FILM COATED ORAL at 09:37

## 2025-05-11 NOTE — PROGRESS NOTE ADULT - SUBJECTIVE AND OBJECTIVE BOX
CHIEF COMPLAINT:  Patient is a 85y old  Male who presents with a chief complaint of SOB (11 May 2025 11:39)      HPI:  86 y/o M w/ PMH of HTN, dyslipidemia, aortic aneyrysm, a-fib s/p ablation yesterday at [facility] w/ Dr. Odonnell) on Eliquis, CAD s/ PCI, chronic systolic CHF, AICD, p/w SOB. Patient states that he had a cardiac ablation 1 day prior to arrival at [facility] and was discharged from [facility] around 1pm yesterday. States that he came home and around 7pm he developed SOB. Patient came to ED, and was treated acute CHF and placed on bipap. Currently patient is off of bipap, and patient states he feels much better. Denies CP, palpitatins, nausea, vomiting, cough, runny nose, sore throat.         REVIEW OF SYSTEMS:  General:  No wt loss, fevers, chills, night sweats  Eyes:  Good vision, no reported pain  ENT:  No sore throat, pain, runny nose, dysphagia  CV:  No pain, palpitations, hypo/hypertension  Resp:  No dyspnea, cough, tachypnea, wheezing  GI:  No pain, nausea, vomiting, diarrhea, constipation  :  No pain, bleeding, incontinence, nocturia  Muscle:  No pain, weakness  Breast:  No pain, abscess, mass, discharge  Neuro:  No weakness, tingling, memory problems  Psych:  No fatigue, insomnia, mood problems, depression  Endocrine:  No polyuria, polydipsia, cold/heat intolerance  Heme:  No petechiae, ecchymosis, easy bruisability  Skin:  No rash, edema    PHYSICAL EXAM:  Vital Signs:  Vital Signs Last 24 Hrs  T(C): 36.9 (11 May 2025 04:35), Max: 37 (11 May 2025 00:15)  T(F): 98.5 (11 May 2025 04:35), Max: 98.6 (11 May 2025 00:15)  HR: 73 (11 May 2025 04:35) (71 - 73)  BP: 106/59 (11 May 2025 04:35) (98/60 - 106/59)  BP(mean): 76 (11 May 2025 00:15) (76 - 76)  RR: 18 (11 May 2025 04:35) (18 - 19)  SpO2: 96% (11 May 2025 04:35) (94% - 100%)    Parameters below as of 11 May 2025 04:35  Patient On (Oxygen Delivery Method): room air        Tele: SR, NSVT    Constitutional: well developed, normal appearance, well groomed, well nourished, no deformities and no acute distress.   Eyes: the conjunctiva exhibited no abnormalities and the eyelids demonstrated no xanthelasmas.   HEENT: normal oral mucosa, no oral pallor and no oral cyanosis.   Neck: normal jugular venous A waves present, normal jugular venous V waves present and no jugular venous negrete A waves.   Pulmonary: no respiratory distress, normal respiratory rhythm and effort, no accessory muscle use and lungs were clear to auscultation bilaterally.   Cardiovascular: heart rate and rhythm were normal, normal S1 and S2 and no murmur, gallop, rub, heave or thrill are present.   Abdomen: soft, non-tender, no hepato-splenomegaly and no abdominal mass palpated.   Musculoskeletal: the gait could not be assessed..   Extremities: no clubbing of the fingernails, no localized cyanosis, no petechial hemorrhages and no ischemic changes.   Skin: normal skin color and pigmentation, no rash, no venous stasis, no skin lesions, no skin ulcer and no xanthoma was observed.   Psychiatric: oriented to person, place, and time, the affect was normal, the mood was normal and not feeling anxious.      LABORATORY:                          13.5   9.79  )-----------( 198      ( 10 May 2025 08:06 )             41.4     05-10    138  |  103  |  34[H]  ----------------------------<  91  4.1   |  29  |  1.21    Ca    9.8      10 May 2025 08:06    TPro  6.4  /  Alb  3.1[L]  /  TBili  0.5  /  DBili  x   /  AST  59[H]  /  ALT  16  /  AlkPhos  47  05-10      LIVER FUNCTIONS - ( 10 May 2025 08:06 )  Alb: 3.1 g/dL / Pro: 6.4 gm/dL / ALK PHOS: 47 U/L / ALT: 16 U/L / AST: 59 U/L / GGT: x           PT/INR - ( 10 May 2025 08:06 )   PT: 14.4 sec;   INR: 1.22 ratio         PTT - ( 10 May 2025 08:06 )  PTT:30.6 sec  Urinalysis Basic - ( 10 May 2025 08:06 )    Color: x / Appearance: x / SG: x / pH: x  Gluc: 91 mg/dL / Ketone: x  / Bili: x / Urobili: x   Blood: x / Protein: x / Nitrite: x   Leuk Esterase: x / RBC: x / WBC x   Sq Epi: x / Non Sq Epi: x / Bacteria: x      Troponin I, High Sensitivity Result: 72489    Pro-Brain Natriuretic Peptide: 1529 pg/mL (05.10.25 @ 01:37)        IMAGING:    < from: 12 Lead ECG (05.10.25 @ 08:00) >  Sinus rhythm with 1st degree A-V block  Left bundle branch block  Abnormal ECG  When compared with ECG of 09-MAY-2025 19:16,  No significant change was found    < end of copied text >    < from: TTE Limited W or WO Ultrasound Enhancing Agent (05.10.25 @ 08:31) >  CONCLUSIONS:      1. Left ventricular systolic function is severely decreased.   2. The right atrium is normal in size.   3. Ascending aorta is dilated, measuring 4.51 cm (indexed 2.04 cm/m²).   4. Mild aortic regurgitation.    < end of copied text >    ASSESSMENT:  86 y/o M w/ PMH of HTN, dyslipidemia, aortic aneyrysm, a-fib s/p ablation yesterday at [facility] w/ Dr. Odonnell) on Eliquis, CAD s/ PCI, chronic systolic CHF, AICD, p/w SOB. Patient states that he had a cardiac ablation 1 day prior to arrival at [facility] and was discharged from [facility] around 1pm yesterday. States that he came home and around 7pm he developed SOB. Patient came to ED, and was treated acute CHF and placed on bipap. Currently patient is off of bipap, and patient states he feels much better. Denies CP, palpitatins, nausea, vomiting, cough, runny nose, sore throat.       PLAN:     MEDICATIONS  (STANDING):  apixaban 5 milliGRAM(s) Oral two times a day  carvedilol 25 milliGRAM(s) Oral every 12 hours  clopidogrel Tablet 75 milliGRAM(s) Oral daily  furosemide   Injectable 40 milliGRAM(s) IV Push daily  potassium chloride    Tablet ER 10 milliEquivalent(s) Oral daily  spironolactone 25 milliGRAM(s) Oral daily  valsartan 40 milliGRAM(s) Oral daily    MEDICATIONS  (PRN):  albuterol    90 MICROgram(s) HFA Inhaler 2 Puff(s) Inhalation every 6 hours PRN for shortness of breath and/or wheezing      Erasto Roman MD, FACC, FASE, CHANEL, FACP       CHIEF COMPLAINT:  Patient is a 85y old  Male who presents with a chief complaint of SOB (11 May 2025 11:39)      HPI:  Benito is seen today resting comfortably in the chair in no immediate distress but had some shortness of breath last evening requiring some albuterol. He feels a bit lethargic today but is diuresing well and denies current chest pains, palpitations, syncope, or edema.    His history is significant for hypertension, dyslipidemia, AFib ablation last week, chronic systolic heart failure, ischemic cardiomyopathy, prior PCI, and ICD. Significant troponin release noted, likely on the basis of demand ischemia and recent ablation and not an ACS.     He had an element of decompensated acute on chronic heart failure with reduced ejection fraction, which appears severely reduced by echo from the hospital.        REVIEW OF SYSTEMS:  General:  No wt loss, fevers, chills, night sweats  Eyes:  Good vision, no reported pain  ENT:  No sore throat, pain, runny nose, dysphagia  CV:  No pain, palpitations, hypo/hypertension  Resp:  No dyspnea, cough, tachypnea, wheezing  GI:  No pain, nausea, vomiting, diarrhea, constipation  :  No pain, bleeding, incontinence, nocturia  Muscle:  No pain, weakness  Breast:  No pain, abscess, mass, discharge  Neuro:  No weakness, tingling, memory problems  Psych:  No fatigue, insomnia, mood problems, depression  Endocrine:  No polyuria, polydipsia, cold/heat intolerance  Heme:  No petechiae, ecchymosis, easy bruisability  Skin:  No rash, edema    PHYSICAL EXAM:  Vital Signs:  Vital Signs Last 24 Hrs  T(C): 36.9 (11 May 2025 04:35), Max: 37 (11 May 2025 00:15)  T(F): 98.5 (11 May 2025 04:35), Max: 98.6 (11 May 2025 00:15)  HR: 73 (11 May 2025 04:35) (71 - 73)  BP: 106/59 (11 May 2025 04:35) (98/60 - 106/59)  BP(mean): 76 (11 May 2025 00:15) (76 - 76)  RR: 18 (11 May 2025 04:35) (18 - 19)  SpO2: 96% (11 May 2025 04:35) (94% - 100%)    Parameters below as of 11 May 2025 04:35  Patient On (Oxygen Delivery Method): room air        Tele: SR, NSVT    Constitutional: well developed, normal appearance, well groomed, well nourished, no deformities and no acute distress.   Eyes: the conjunctiva exhibited no abnormalities and the eyelids demonstrated no xanthelasmas.   HEENT: normal oral mucosa, no oral pallor and no oral cyanosis.   Neck: normal jugular venous A waves present, normal jugular venous V waves present and no jugular venous negrete A waves.   Pulmonary: no respiratory distress, normal respiratory rhythm and effort, no accessory muscle use and lungs were clear to auscultation bilaterally.   Cardiovascular: heart rate and rhythm were normal, normal S1 and S2 and no murmur  Abdomen: soft, non-tender  Musculoskeletal: the gait could not be assessed.   Extremities: no clubbing of the fingernails, no localized cyanosis, no petechial hemorrhages and no ischemic changes.   Skin: normal skin color and pigmentation, no rash, no venous stasis, no skin lesions, no skin ulcer and no xanthoma was observed.   Psychiatric: oriented to person, place, and time, the affect was normal, the mood was normal and not feeling anxious.      LABORATORY:                          13.5   9.79  )-----------( 198      ( 10 May 2025 08:06 )             41.4     05-10    138  |  103  |  34[H]  ----------------------------<  91  4.1   |  29  |  1.21    Ca    9.8      10 May 2025 08:06    TPro  6.4  /  Alb  3.1[L]  /  TBili  0.5  /  DBili  x   /  AST  59[H]  /  ALT  16  /  AlkPhos  47  05-10      LIVER FUNCTIONS - ( 10 May 2025 08:06 )  Alb: 3.1 g/dL / Pro: 6.4 gm/dL / ALK PHOS: 47 U/L / ALT: 16 U/L / AST: 59 U/L / GGT: x           PT/INR - ( 10 May 2025 08:06 )   PT: 14.4 sec;   INR: 1.22 ratio         PTT - ( 10 May 2025 08:06 )  PTT:30.6 sec  Urinalysis Basic - ( 10 May 2025 08:06 )    Color: x / Appearance: x / SG: x / pH: x  Gluc: 91 mg/dL / Ketone: x  / Bili: x / Urobili: x   Blood: x / Protein: x / Nitrite: x   Leuk Esterase: x / RBC: x / WBC x   Sq Epi: x / Non Sq Epi: x / Bacteria: x      Troponin I, High Sensitivity Result: [id number]    Pro-Brain Natriuretic Peptide: 1529 pg/mL (05.10.25 @ 01:37)        IMAGING:    < from: 12 Lead ECG (05.10.25 @ 08:00) >  Sinus rhythm with 1st degree A-V block  Left bundle branch block  Abnormal ECG  When compared with ECG of 09-MAY-2025 19:16,  No significant change was found    < end of copied text >    < from: TTE Limited W or WO Ultrasound Enhancing Agent (05.10.25 @ 08:31) >  CONCLUSIONS:      1. Left ventricular systolic function is severely decreased.   2. The right atrium is normal in size.   3. Ascending aorta is dilated, measuring 4.51 cm (indexed 2.04 cm/m²).   4. Mild aortic regurgitation.    < end of copied text >    ASSESSMENT:  Benito is seen today resting comfortably in the chair in no immediate distress but had some shortness of breath last evening requiring some albuterol. He feels a bit lethargic today but is diuresing well and denies current chest pains, palpitations, syncope, or edema.    His history is significant for hypertension, dyslipidemia, AFib ablation last week, chronic systolic heart failure, ischemic cardiomyopathy, prior PCI, and ICD. Significant troponin release noted, likely on the basis of demand ischemia and recent ablation and not an ACS.     He had an element of decompensated acute on chronic heart failure with reduced ejection fraction, which appears severely reduced by echo from the hospital.      PLAN:     He will continue on telemetry cardiac monitoring along with lab assessments. Continue Lasix, 40 mg IV push daily to help offset heart failure symptoms. Continue albuterol for the component of bronchial reactivity. Continue apixaban, 5 mg BID for stroke risk reduction in the setting of paroxysmal AFib, now atrial fibrillation ablation, and maintaining sinus rhythm.    Continue carvedilol, 25 mg BID with valsartan, 40 mg daily, and spironolactone, 25 mg daily for guideline-directed therapies for heart failure management. Maintain potassium supplementation and Plavix, 75 mg daily for ischemic heart disease. Maintain hospitalization for care and safety. Fluid and salt intake restriction emphasized.    We'll follow along with you. Thanks for allowing me to participate in the care of your patient.      Erasto Roman MD, FACC, ALLY, CHANEL, FACP  [facility]

## 2025-05-11 NOTE — PROGRESS NOTE ADULT - SUBJECTIVE AND OBJECTIVE BOX
5/10: no complaints  no cp/sob    5/11: feeling better today    PHYSICAL EXAM:    Daily Height in cm: 177.8 (09 May 2025 19:14)    Daily     Vital Signs Last 24 Hrs  T(C): 36.9 (11 May 2025 04:35), Max: 37 (11 May 2025 00:15)  T(F): 98.5 (11 May 2025 04:35), Max: 98.6 (11 May 2025 00:15)  HR: 73 (11 May 2025 04:35) (71 - 73)  BP: 106/59 (11 May 2025 04:35) (98/60 - 106/59)  BP(mean): 76 (11 May 2025 00:15) (76 - 76)  RR: 18 (11 May 2025 04:35) (18 - 19)  SpO2: 96% (11 May 2025 04:35) (94% - 100%)    Parameters below as of 11 May 2025 04:35  Patient On (Oxygen Delivery Method): room air        Constitutional: Weak l appearing  HEENT: Atraumatic, EMIL,   Respiratory: Breath Sounds normal, no rhonchi/wheeze  Cardiovascular: N S1S2;   Gastrointestinal: Abdomen soft, non tender, Bowel Sounds present  Extremities: trace edema, peripheral pulses present  Neurological: AAO x 3, no gross focal motor deficits  Skin: Non cellulitic, no rash, ulcers  Lymph Nodes: No lymphadenopathy noted  Back: No CVA tenderness   Musculoskeletal: non tender  Breasts: Deferred  Genitourinary: deferred  Rectal: Deferred    All Labs/EKG/Radiology/Meds reviewed by me                          13.5   9.79  )-----------( 198      ( 10 May 2025 08:06 )             41.4       CBC Full  -  ( 10 May 2025 08:06 )  WBC Count : 9.79 K/uL  RBC Count : 4.15 M/uL  Hemoglobin : 13.5 g/dL  Hematocrit : 41.4 %  Platelet Count - Automated : 198 K/uL  Mean Cell Volume : 99.8 fl  Mean Cell Hemoglobin : 32.5 pg  Mean Cell Hemoglobin Concentration : 32.6 g/dL  Auto Neutrophil # : 6.18 K/uL  Auto Lymphocyte # : 1.21 K/uL  Auto Monocyte # : 1.07 K/uL  Auto Eosinophil # : 1.21 K/uL  Auto Basophil # : 0.08 K/uL  Auto Neutrophil % : 63.1 %  Auto Lymphocyte % : 12.4 %  Auto Monocyte % : 10.9 %  Auto Eosinophil % : 12.4 %  Auto Basophil % : 0.8 %      05-10    138  |  103  |  34[H]  ----------------------------<  91  4.1   |  29  |  1.21    Ca    9.8      10 May 2025 08:06  Phos  4.4     05-09  Mg     1.90     05-09    TPro  6.4  /  Alb  3.1[L]  /  TBili  0.5  /  DBili  x   /  AST  59[H]  /  ALT  16  /  AlkPhos  47  05-10      LIVER FUNCTIONS - ( 10 May 2025 08:06 )  Alb: 3.1 g/dL / Pro: 6.4 gm/dL / ALK PHOS: 47 U/L / ALT: 16 U/L / AST: 59 U/L / GGT: x             PT/INR - ( 10 May 2025 08:06 )   PT: 14.4 sec;   INR: 1.22 ratio         PTT - ( 10 May 2025 08:06 )  PTT:30.6 sec          Urinalysis Basic - ( 10 May 2025 08:06 )    Color: x / Appearance: x / SG: x / pH: x  Gluc: 91 mg/dL / Ketone: x  / Bili: x / Urobili: x   Blood: x / Protein: x / Nitrite: x   Leuk Esterase: x / RBC: x / WBC x   Sq Epi: x / Non Sq Epi: x / Bacteria: x            MEDICATIONS  (STANDING):  apixaban 5 milliGRAM(s) Oral two times a day  carvedilol 25 milliGRAM(s) Oral every 12 hours  clopidogrel Tablet 75 milliGRAM(s) Oral daily  furosemide   Injectable 40 milliGRAM(s) IV Push daily  potassium chloride    Tablet ER 10 milliEquivalent(s) Oral daily  spironolactone 25 milliGRAM(s) Oral daily  valsartan 40 milliGRAM(s) Oral daily    MEDICATIONS  (PRN):  albuterol    90 MICROgram(s) HFA Inhaler 2 Puff(s) Inhalation every 6 hours PRN for shortness of breath and/or wheezing

## 2025-05-11 NOTE — PROGRESS NOTE ADULT - ASSESSMENT
84 y/o M w/ PMH of HTN, dyslipidemia, aortic aneurysm, a-fib s/p ablation on 5/8 at [facility] w/ Dr. Odonnell) on Eliquis, CAD s/ PCI, chronic systolic CHF, AICD, p/w SOB    *Acute on chronic systolic CHF   -CXR: Mild to moderate CHF versus bilateral infiltrates  -IV lasix   -Last Echo 2/27/25  -I/Os + Daily weights   -On BB / ARB   -Cardio consult  -Tele monitoring    *Elevated troponins s/p cardiac ablation for a-fib   *H/o CAD   -As per Dr. Henriquez, she discussed case w/ cardio, who stated that patient did not need heparin drip, as he is on Eliquis. Discussed w/ ICU KELLEY Cleary regarding admitting patient to CCU for significantly elevated troponins. Madiha stated that she discussed case w/ on call cardiologist Dr Hodges, and states that he is okay to go to Tele floor.   -EKG: LBBB (Old)   -TTE    *H/o HTN / dyslipidemia / aortic aneurysm   -C/w home meds and f/u outpatient for further management     *DVT ppx  -On Eliquis

## 2025-05-12 LAB
ANION GAP SERPL CALC-SCNC: 6 MMOL/L — SIGNIFICANT CHANGE UP (ref 5–17)
BUN SERPL-MCNC: 41 MG/DL — HIGH (ref 7–23)
CALCIUM SERPL-MCNC: 10.2 MG/DL — HIGH (ref 8.5–10.1)
CHLORIDE SERPL-SCNC: 107 MMOL/L — SIGNIFICANT CHANGE UP (ref 96–108)
CO2 SERPL-SCNC: 26 MMOL/L — SIGNIFICANT CHANGE UP (ref 22–31)
CREAT SERPL-MCNC: 1.15 MG/DL — SIGNIFICANT CHANGE UP (ref 0.5–1.3)
EGFR: 62 ML/MIN/1.73M2 — SIGNIFICANT CHANGE UP
EGFR: 62 ML/MIN/1.73M2 — SIGNIFICANT CHANGE UP
GLUCOSE SERPL-MCNC: 109 MG/DL — HIGH (ref 70–99)
MAGNESIUM SERPL-MCNC: 1.9 MG/DL — SIGNIFICANT CHANGE UP (ref 1.6–2.6)
POTASSIUM SERPL-MCNC: 4.3 MMOL/L — SIGNIFICANT CHANGE UP (ref 3.5–5.3)
POTASSIUM SERPL-SCNC: 4.3 MMOL/L — SIGNIFICANT CHANGE UP (ref 3.5–5.3)
SODIUM SERPL-SCNC: 139 MMOL/L — SIGNIFICANT CHANGE UP (ref 135–145)

## 2025-05-12 PROCEDURE — 99232 SBSQ HOSP IP/OBS MODERATE 35: CPT

## 2025-05-12 RX ORDER — FUROSEMIDE 10 MG/ML
40 INJECTION INTRAMUSCULAR; INTRAVENOUS
Refills: 0 | Status: DISCONTINUED | OUTPATIENT
Start: 2025-05-12 | End: 2025-05-14

## 2025-05-12 RX ORDER — FUROSEMIDE 10 MG/ML
40 INJECTION INTRAMUSCULAR; INTRAVENOUS ONCE
Refills: 0 | Status: COMPLETED | OUTPATIENT
Start: 2025-05-12 | End: 2025-05-12

## 2025-05-12 RX ORDER — IPRATROPIUM BROMIDE AND ALBUTEROL SULFATE .5; 2.5 MG/3ML; MG/3ML
3 SOLUTION RESPIRATORY (INHALATION)
Refills: 0 | Status: DISCONTINUED | OUTPATIENT
Start: 2025-05-12 | End: 2025-05-16

## 2025-05-12 RX ORDER — DEXTROMETHORPHAN HBR, GUAIFENESIN 200 MG/10ML
200 LIQUID ORAL EVERY 6 HOURS
Refills: 0 | Status: DISCONTINUED | OUTPATIENT
Start: 2025-05-12 | End: 2025-05-16

## 2025-05-12 RX ADMIN — DEXTROMETHORPHAN HBR, GUAIFENESIN 200 MILLIGRAM(S): 200 LIQUID ORAL at 10:52

## 2025-05-12 RX ADMIN — Medication 40 MILLIGRAM(S): at 09:43

## 2025-05-12 RX ADMIN — FUROSEMIDE 40 MILLIGRAM(S): 10 INJECTION INTRAMUSCULAR; INTRAVENOUS at 10:10

## 2025-05-12 RX ADMIN — Medication 10 MILLIEQUIVALENT(S): at 09:43

## 2025-05-12 RX ADMIN — APIXABAN 5 MILLIGRAM(S): 2.5 TABLET, FILM COATED ORAL at 09:43

## 2025-05-12 RX ADMIN — Medication 25 MILLIGRAM(S): at 05:46

## 2025-05-12 RX ADMIN — CLOPIDOGREL BISULFATE 75 MILLIGRAM(S): 75 TABLET, FILM COATED ORAL at 09:43

## 2025-05-12 RX ADMIN — APIXABAN 5 MILLIGRAM(S): 2.5 TABLET, FILM COATED ORAL at 22:11

## 2025-05-12 RX ADMIN — DEXTROMETHORPHAN HBR, GUAIFENESIN 200 MILLIGRAM(S): 200 LIQUID ORAL at 17:30

## 2025-05-12 RX ADMIN — CARVEDILOL 25 MILLIGRAM(S): 3.12 TABLET, FILM COATED ORAL at 09:43

## 2025-05-12 RX ADMIN — FUROSEMIDE 40 MILLIGRAM(S): 10 INJECTION INTRAMUSCULAR; INTRAVENOUS at 17:32

## 2025-05-12 RX ADMIN — IPRATROPIUM BROMIDE AND ALBUTEROL SULFATE 3 MILLILITER(S): .5; 2.5 SOLUTION RESPIRATORY (INHALATION) at 21:37

## 2025-05-12 NOTE — PROGRESS NOTE ADULT - ASSESSMENT
84 y/o M w/ PMH of HTN, dyslipidemia, aortic aneurysm, a-fib s/p ablation on 5/8 at [facility] w/ Dr. Odonnell) on Eliquis, CAD s/ PCI, chronic systolic CHF, AICD, p/w SOB    *Acute on chronic systolic CHF   -CXR: Mild to moderate CHF versus bilateral infiltrates  -IV lasix bid  -Last Echo 2/27/25  -I/Os + Daily weights   -On BB / ARB   -Cardio consult  -Tele monitoring  pulmo consult  duonebs    *Elevated troponins s/p cardiac ablation for a-fib   *H/o CAD   -As per Dr. Henriquez, she discussed case w/ cardio, who stated that patient did not need heparin drip, as he is on Eliquis. Discussed w/ ICU KELLEY Cleary regarding admitting patient to CCU for significantly elevated troponins. Madiha stated that she discussed case w/ on call cardiologist Dr Hodges, and states that he is okay to go to Tele floor.   -EKG: LBBB (Old)   -TTE    *H/o HTN / dyslipidemia / aortic aneurysm   -C/w home meds and f/u outpatient for further management     *DVT ppx  -On Eliquis

## 2025-05-12 NOTE — PROGRESS NOTE ADULT - SUBJECTIVE AND OBJECTIVE BOX
Patient is a 85y old  Male who presents with a chief complaint of SOB (11 May 2025 12:30)    5/10/2025-  I saw Benito today for cardiac assessments as he's in [facility] now with heart failure. He's a pleasant 85-year-old with hypertension, dyslipidemia, known aortic aneurysm, atrial fibrillation ablation a few days ago at [facility], history of ischemic heart disease and cardiomyopathy with prior PCI, systolic heart failure, and ICD, on Eliquis, admitted with shortness of breath and found to have heart failure. He was placed on BiPAP, given IV Lasix, and is diuresing well without issues.    He's seen resting comfortably in bed, no immediate distress, no current chest pain, shortness of breath, palpitations, syncope, or edema. He had significant troponin elevation, likely on the basis of demand ischemia and recent AFib ablation and not from a coronary ischemic event. Although ECG is uninterpretable, showing left bundle branch block, he does have evidence of brief bursts of non-sustained VT on telemetry, which is asymptomatic.      PSH: PCI, tonsillectomy, B/L hip replacement, B/L TKR, cataract surgery     Social Hx: Tobacco - quit 45 years ago, etoh - occasionally once per week, drugs - denies     Family Hx:  Denies pertinent hx      (10 May 2025 01:20)    ALLERGIES:  Allergies    latex (Rash)  morphine (Vomiting)  neoprene - rash (Other)      Followup HPI:  5/11-Benito is seen today resting comfortably in the chair in no immediate distress but had some shortness of breath last evening requiring some albuterol. He feels a bit lethargic today but is diuresing well and denies current chest pains, palpitations, syncope, or edema.    His history is significant for hypertension, dyslipidemia, AFib ablation last week, chronic systolic heart failure, ischemic cardiomyopathy, prior PCI, and ICD. Significant troponin release noted, likely on the basis of demand ischemia and recent ablation and not an ACS.     He had an element of decompensated acute on chronic heart failure with reduced ejection fraction, which appears severely reduced by echo from the hospital.    5/12- Coughing up green sputum. Feels breathless and is wheezing intermittently. Stateshe feels no better than on admission. Denies  chest pain.    PAST MEDICAL & SURGICAL HISTORY:  GERD (gastroesophageal reflux disease)      CAD (coronary artery disease)      Stented coronary artery  PTCA x 1 stent -2013      HTN (hypertension)      Obesity      Hypercholesterolemia      Osteoarthritis  hip      AF (atrial fibrillation)      Aortic aneurysm, thoracic      NICM (nonischemic cardiomyopathy)      History of PTCA 1  2013 x 1 stent      S/P tonsillectomy  13 y/o      H/O bilateral hip replacements  right 1999,  2003 left  revision right THR - 2010 and 2013      H/o total knee replacement, bilateral  right 1997, left 1999      S/P cataract surgery  b/l      H/O endoscopy  2017      H/O colonoscopy  2014          Review of symptoms:  Negative except for as noted in today's HPI.      MEDICATIONS  (STANDING):  apixaban 5 milliGRAM(s) Oral two times a day  carvedilol 25 milliGRAM(s) Oral every 12 hours  clopidogrel Tablet 75 milliGRAM(s) Oral daily  furosemide   Injectable 40 milliGRAM(s) IV Push daily  potassium chloride    Tablet ER 10 milliEquivalent(s) Oral daily  spironolactone 25 milliGRAM(s) Oral daily  valsartan 40 milliGRAM(s) Oral daily    MEDICATIONS  (PRN):  albuterol    90 MICROgram(s) HFA Inhaler 2 Puff(s) Inhalation every 6 hours PRN for shortness of breath and/or wheezing          Vital Signs Last 24 Hrs  T(C): 36.8 (12 May 2025 04:49), Max: 36.8 (12 May 2025 00:02)  T(F): 98.2 (12 May 2025 04:49), Max: 98.3 (12 May 2025 00:02)  HR: 72 (12 May 2025 04:49) (72 - 76)  BP: 103/51 (12 May 2025 04:49) (98/46 - 103/51)  BP(mean): --  RR: 18 (12 May 2025 04:49) (18 - 18)  SpO2: 95% (12 May 2025 04:49) (95% - 97%)    Parameters below as of 12 May 2025 04:49  Patient On (Oxygen Delivery Method): room air        I&O's Summary      PHYSICAL EXAM  General Appearance: comfortable  HEENT:   Neck:   Lungs: scant bibasilar crackles  Heart: no murmur or gallop  Abdomen:   Extremities: no edema  Neurologic:       INTERPRETATION OF TELEMETRY: RSR, first degree AV block, occasional V pacing    ECG:    LABS:                          13.5   9.79  )-----------( 198      ( 10 May 2025 08:06 )             41.4     05-10    138  |  103  |  34[H]  ----------------------------<  91  4.1   |  29  |  1.21    Ca    9.8      10 May 2025 08:06    TPro  6.4  /  Alb  3.1[L]  /  TBili  0.5  /  DBili  x   /  AST  59[H]  /  ALT  16  /  AlkPhos  47  05-10        Lipid Panel  186  46  --  99      PT/INR - ( 10 May 2025 08:06 )   PT: 14.4 sec;   INR: 1.22 ratio          PTT - ( 10 May 2025 08:06 )  PTT:30.6 sec  Urinalysis Basic - ( 10 May 2025 08:06 )    Color: x / Appearance: x / SG: x / pH: x  Gluc: 91 mg/dL / Ketone: x  / Bili: x / Urobili: x   Blood: x / Protein: x / Nitrite: x   Leuk Esterase: x / RBC: x / WBC x   Sq Epi: x / Non Sq Epi: x / Bacteria: x            RADIOLOGY & ADDITIONAL STUDIES:    IMPRESSION:  1. Severe nonischemic cardiomyopathy with CHF. Has persistentt respiratory symptoms.  2.Interstitial lung disease and COPD appear to be causing some of his symptoms.  3.S/P AF ablation 5/8/2025.  4.ICD in situ since 5/9/2024.  5. CAD VINOD RCA 2013. Troponin elevation due to AF ablation and oxygen demand supply mismatch. No angina or ACS.  6.Thoracic ascending aortic aneurysm 4.9 cm by CT 1/29/2025. Stable.   PLAN:  Increase furosemide 40 mg IV bid. Continue with spironolactone, valsartan, carvedilol, Plavix, Eliquis, KCL. Suggest consider pulmonary consultation. He may benefit from adding additional meds for bronchospasm or pulmonary infection.

## 2025-05-12 NOTE — PROGRESS NOTE ADULT - SUBJECTIVE AND OBJECTIVE BOX
5/10: no complaints  no cp/sob    5/11: feeling better today    5/12: c/o some cough and sob last night  lasix increased to bid    PHYSICAL EXAM:    Daily Height in cm: 177.8 (09 May 2025 19:14)    Daily     Vital Signs Last 24 Hrs  T(C): 36.3 (12 May 2025 08:30), Max: 36.8 (12 May 2025 00:02)  T(F): 97.4 (12 May 2025 08:30), Max: 98.3 (12 May 2025 00:02)  HR: 67 (12 May 2025 08:30) (67 - 76)  BP: 96/60 (12 May 2025 08:30) (96/60 - 103/51)  BP(mean): --  RR: 18 (12 May 2025 08:30) (18 - 18)  SpO2: 95% (12 May 2025 08:30) (95% - 97%)    Parameters below as of 12 May 2025 08:30  Patient On (Oxygen Delivery Method): room air      Constitutional: Weak  appearing  HEENT: Atraumatic, EMIL,   Respiratory: Breath Sounds normal, no rhonchi/wheeze  Cardiovascular: N S1S2;   Gastrointestinal: Abdomen soft, non tender, Bowel Sounds present  Extremities: trace edema, peripheral pulses present  Neurological: AAO x 3, no gross focal motor deficits  Skin: Non cellulitic, no rash, ulcers  Lymph Nodes: No lymphadenopathy noted  Back: No CVA tenderness   Musculoskeletal: non tender  Breasts: Deferred  Genitourinary: deferred  Rectal: Deferred    All Labs/EKG/Radiology/Meds reviewed by me                          13.5   9.79  )-----------( 198      ( 10 May 2025 08:06 )             41.4       CBC Full  -  ( 10 May 2025 08:06 )  WBC Count : 9.79 K/uL  RBC Count : 4.15 M/uL  Hemoglobin : 13.5 g/dL  Hematocrit : 41.4 %  Platelet Count - Automated : 198 K/uL  Mean Cell Volume : 99.8 fl  Mean Cell Hemoglobin : 32.5 pg  Mean Cell Hemoglobin Concentration : 32.6 g/dL  Auto Neutrophil # : 6.18 K/uL  Auto Lymphocyte # : 1.21 K/uL  Auto Monocyte # : 1.07 K/uL  Auto Eosinophil # : 1.21 K/uL  Auto Basophil # : 0.08 K/uL  Auto Neutrophil % : 63.1 %  Auto Lymphocyte % : 12.4 %  Auto Monocyte % : 10.9 %  Auto Eosinophil % : 12.4 %  Auto Basophil % : 0.8 %      05-10    138  |  103  |  34[H]  ----------------------------<  91  4.1   |  29  |  1.21    Ca    9.8      10 May 2025 08:06  Phos  4.4     05-09  Mg     1.90     05-09    TPro  6.4  /  Alb  3.1[L]  /  TBili  0.5  /  DBili  x   /  AST  59[H]  /  ALT  16  /  AlkPhos  47  05-10      LIVER FUNCTIONS - ( 10 May 2025 08:06 )  Alb: 3.1 g/dL / Pro: 6.4 gm/dL / ALK PHOS: 47 U/L / ALT: 16 U/L / AST: 59 U/L / GGT: x             PT/INR - ( 10 May 2025 08:06 )   PT: 14.4 sec;   INR: 1.22 ratio         PTT - ( 10 May 2025 08:06 )  PTT:30.6 sec          Urinalysis Basic - ( 10 May 2025 08:06 )    Color: x / Appearance: x / SG: x / pH: x  Gluc: 91 mg/dL / Ketone: x  / Bili: x / Urobili: x   Blood: x / Protein: x / Nitrite: x   Leuk Esterase: x / RBC: x / WBC x   Sq Epi: x / Non Sq Epi: x / Bacteria: x        MEDICATIONS  (STANDING):  apixaban 5 milliGRAM(s) Oral two times a day  carvedilol 25 milliGRAM(s) Oral every 12 hours  clopidogrel Tablet 75 milliGRAM(s) Oral daily  furosemide   Injectable 40 milliGRAM(s) IV Push two times a day  potassium chloride    Tablet ER 10 milliEquivalent(s) Oral daily  spironolactone 25 milliGRAM(s) Oral daily  valsartan 40 milliGRAM(s) Oral daily    MEDICATIONS  (PRN):  albuterol    90 MICROgram(s) HFA Inhaler 2 Puff(s) Inhalation every 6 hours PRN for shortness of breath and/or wheezing  guaiFENesin Oral Liquid (Sugar-Free) 200 milliGRAM(s) Oral every 6 hours PRN Cough

## 2025-05-13 LAB
ANION GAP SERPL CALC-SCNC: 4 MMOL/L — LOW (ref 5–17)
BUN SERPL-MCNC: 38 MG/DL — HIGH (ref 7–23)
CALCIUM SERPL-MCNC: 10.1 MG/DL — SIGNIFICANT CHANGE UP (ref 8.5–10.1)
CHLORIDE SERPL-SCNC: 103 MMOL/L — SIGNIFICANT CHANGE UP (ref 96–108)
CO2 SERPL-SCNC: 29 MMOL/L — SIGNIFICANT CHANGE UP (ref 22–31)
CREAT SERPL-MCNC: 1.17 MG/DL — SIGNIFICANT CHANGE UP (ref 0.5–1.3)
EGFR: 61 ML/MIN/1.73M2 — SIGNIFICANT CHANGE UP
EGFR: 61 ML/MIN/1.73M2 — SIGNIFICANT CHANGE UP
GLUCOSE SERPL-MCNC: 176 MG/DL — HIGH (ref 70–99)
MAGNESIUM SERPL-MCNC: 2.1 MG/DL — SIGNIFICANT CHANGE UP (ref 1.6–2.6)
POTASSIUM SERPL-MCNC: 4.6 MMOL/L — SIGNIFICANT CHANGE UP (ref 3.5–5.3)
POTASSIUM SERPL-SCNC: 4.6 MMOL/L — SIGNIFICANT CHANGE UP (ref 3.5–5.3)
SODIUM SERPL-SCNC: 136 MMOL/L — SIGNIFICANT CHANGE UP (ref 135–145)

## 2025-05-13 PROCEDURE — 99223 1ST HOSP IP/OBS HIGH 75: CPT

## 2025-05-13 PROCEDURE — 93282 PRGRMG EVAL IMPLANTABLE DFB: CPT | Mod: 26

## 2025-05-13 PROCEDURE — 71250 CT THORAX DX C-: CPT | Mod: 26

## 2025-05-13 PROCEDURE — 99232 SBSQ HOSP IP/OBS MODERATE 35: CPT

## 2025-05-13 RX ORDER — HYDROCORTISONE 20 MG
40 TABLET ORAL ONCE
Refills: 0 | Status: COMPLETED | OUTPATIENT
Start: 2025-05-13 | End: 2025-05-13

## 2025-05-13 RX ORDER — MAGNESIUM OXIDE 400 MG
400 TABLET ORAL DAILY
Refills: 0 | Status: DISCONTINUED | OUTPATIENT
Start: 2025-05-13 | End: 2025-05-16

## 2025-05-13 RX ORDER — IPRATROPIUM BROMIDE AND ALBUTEROL SULFATE .5; 2.5 MG/3ML; MG/3ML
3 SOLUTION RESPIRATORY (INHALATION) ONCE
Refills: 0 | Status: COMPLETED | OUTPATIENT
Start: 2025-05-13 | End: 2025-05-13

## 2025-05-13 RX ADMIN — Medication 40 MILLIGRAM(S): at 02:20

## 2025-05-13 RX ADMIN — IPRATROPIUM BROMIDE AND ALBUTEROL SULFATE 3 MILLILITER(S): .5; 2.5 SOLUTION RESPIRATORY (INHALATION) at 02:13

## 2025-05-13 RX ADMIN — DEXTROMETHORPHAN HBR, GUAIFENESIN 200 MILLIGRAM(S): 200 LIQUID ORAL at 09:49

## 2025-05-13 RX ADMIN — DEXTROMETHORPHAN HBR, GUAIFENESIN 200 MILLIGRAM(S): 200 LIQUID ORAL at 22:13

## 2025-05-13 RX ADMIN — IPRATROPIUM BROMIDE AND ALBUTEROL SULFATE 3 MILLILITER(S): .5; 2.5 SOLUTION RESPIRATORY (INHALATION) at 08:14

## 2025-05-13 RX ADMIN — Medication 1 DOSE(S): at 20:20

## 2025-05-13 RX ADMIN — APIXABAN 5 MILLIGRAM(S): 2.5 TABLET, FILM COATED ORAL at 09:50

## 2025-05-13 RX ADMIN — Medication 400 MILLIGRAM(S): at 09:49

## 2025-05-13 RX ADMIN — IPRATROPIUM BROMIDE AND ALBUTEROL SULFATE 3 MILLILITER(S): .5; 2.5 SOLUTION RESPIRATORY (INHALATION) at 11:47

## 2025-05-13 RX ADMIN — CLOPIDOGREL BISULFATE 75 MILLIGRAM(S): 75 TABLET, FILM COATED ORAL at 09:49

## 2025-05-13 RX ADMIN — CARVEDILOL 25 MILLIGRAM(S): 3.12 TABLET, FILM COATED ORAL at 09:51

## 2025-05-13 RX ADMIN — APIXABAN 5 MILLIGRAM(S): 2.5 TABLET, FILM COATED ORAL at 22:13

## 2025-05-13 RX ADMIN — Medication 25 MILLIGRAM(S): at 09:50

## 2025-05-13 RX ADMIN — Medication 10 MILLIEQUIVALENT(S): at 09:55

## 2025-05-13 RX ADMIN — IPRATROPIUM BROMIDE AND ALBUTEROL SULFATE 3 MILLILITER(S): .5; 2.5 SOLUTION RESPIRATORY (INHALATION) at 17:05

## 2025-05-13 RX ADMIN — Medication 1 DOSE(S): at 11:53

## 2025-05-13 RX ADMIN — DEXTROMETHORPHAN HBR, GUAIFENESIN 200 MILLIGRAM(S): 200 LIQUID ORAL at 00:22

## 2025-05-13 RX ADMIN — IPRATROPIUM BROMIDE AND ALBUTEROL SULFATE 3 MILLILITER(S): .5; 2.5 SOLUTION RESPIRATORY (INHALATION) at 20:19

## 2025-05-13 RX ADMIN — Medication 40 MILLIGRAM(S): at 09:50

## 2025-05-13 NOTE — PROGRESS NOTE ADULT - ASSESSMENT
85 year old male with evidence of interstitial lung diease.  There is evidence of subpleural reticulation and worsening right sided disease but there is evidence of bronchocentricity and GGO that speaks to alternative diagnosis. While the patient has not progressed significantly since 2024, from 2023 there appears to be subtle progression. I suspect most of his debility is cardiac related but since this is unlikelly to IPF can trial steroids to assess symptomatic improvement.     Ordered for prednisone 40 mg daily  If patient is ready for discharge can see him as an outpatient to perform ILD work up.  Based on CT scan pattern suspect perhaps autoimmune disease  Depending on duration of prednisone, patient may ultimately need bactrim ppx.

## 2025-05-13 NOTE — PROGRESS NOTE ADULT - ASSESSMENT
86 y/o M w/ PMH of HTN, dyslipidemia, aortic aneurysm, a-fib s/p ablation on 5/8 at [facility] w/ Dr. Odonnell) on Eliquis, CAD s/ PCI, chronic systolic CHF, AICD, p/w SOB    *Acute on chronic systolic CHF   -CXR: Mild to moderate CHF versus bilateral infiltrates  -IV lasix bid  -Last Echo 2/27/25  -I/Os + Daily weights   -On BB / ARB   -Cardio consult  -Tele monitoring  pulmo consult  duonebs  CT chest    *Elevated troponins s/p cardiac ablation for a-fib   *H/o CAD   -As per Dr. Henriquez, she discussed case w/ cardio, who stated that patient did not need heparin drip, as he is on Eliquis. Discussed w/ ICU KELLEY Cleary regarding admitting patient to CCU for significantly elevated troponins. Madiha stated that she discussed case w/ on call cardiologist Dr Hodges, and states that he is okay to go to Tele floor.   -EKG: LBBB (Old)   -TTE    *H/o HTN / dyslipidemia / aortic aneurysm   -C/w home meds and f/u outpatient for further management     *DVT ppx  -On Eliquis     DISPO: CT chest  pulmo consult   84 y/o M w/ PMH of HTN, dyslipidemia, aortic aneurysm, a-fib s/p ablation on 5/8 at [facility] w/ Dr. Odonnell) on Eliquis, CAD s/ PCI, chronic systolic CHF, AICD, p/w SOB    *Acute on chronic systolic CHF   -CXR: Mild to moderate CHF versus bilateral infiltrates  -IV lasix bid  -Last Echo 2/27/25  -I/Os + Daily weights   -On BB / ARB   -Cardio consult  -Tele monitoring  pulmo consult  duonebs  CT chest    *Elevated troponins s/p cardiac ablation for a-fib   *H/o CAD   -As per Dr. Henriquez, she discussed case w/ cardio, who stated that patient did not need heparin drip, as he is on Eliquis. Discussed w/ ICU KELLEY Cleary regarding admitting patient to CCU for significantly elevated troponins. Madiha stated that she discussed case w/ on call cardiologist Dr Hodges, and states that he is okay to go to Tele floor.   -EKG: LBBB (Old)   -TTE    * NSVT: 27 beats 0n 5/12  has AICD  EP cx appreciated  cont current mx    *H/o HTN / dyslipidemia / aortic aneurysm   -C/w home meds and f/u outpatient for further management     *DVT ppx  -On Eliquis     DISPO: CT chest  pulmo consult

## 2025-05-13 NOTE — PROGRESS NOTE ADULT - SUBJECTIVE AND OBJECTIVE BOX
5/10: no complaints  no cp/sob    5/11: feeling better today    5/12: c/o some cough and sob last night  lasix increased to bid    5/13: feeling better  decreasing cough  27 beats NSVT 5/12      PHYSICAL EXAM:    Daily     Daily     Vital Signs Last 24 Hrs  T(C): 36.3 (13 May 2025 12:35), Max: 36.7 (13 May 2025 09:09)  T(F): 97.4 (13 May 2025 12:35), Max: 98 (13 May 2025 09:09)  HR: 72 (13 May 2025 12:35) (61 - 78)  BP: 98/65 (13 May 2025 12:35) (98/65 - 112/73)  BP(mean): --  RR: 18 (13 May 2025 12:35) (18 - 19)  SpO2: 93% (13 May 2025 12:35) (93% - 98%)    Constitutional: Weak and ill appearing  HEENT: Atraumatic, EMIL,   Respiratory: Breath Sounds normal, no rhonchi/wheeze  Cardiovascular: N S1S2;   Gastrointestinal: Abdomen soft, non tender, Bowel Sounds present  Extremities: No edema, peripheral pulses present  Neurological: AAO x 3, no gross focal motor deficits  Skin: Non cellulitic, no rash, ulcers  Lymph Nodes: No lymphadenopathy noted  Back: No CVA tenderness   Musculoskeletal: non tender  Breasts: Deferred  Genitourinary: deferred  Rectal: Deferred    All Labs/EKG/Radiology/Meds reviewed by me            05-13    136  |  103  |  38[H]  ----------------------------<  176[H]  4.6   |  29  |  1.17    Ca    10.1      13 May 2025 09:26  Mg     2.1     05-13                      Urinalysis Basic - ( 13 May 2025 09:26 )    Color: x / Appearance: x / SG: x / pH: x  Gluc: 176 mg/dL / Ketone: x  / Bili: x / Urobili: x   Blood: x / Protein: x / Nitrite: x   Leuk Esterase: x / RBC: x / WBC x   Sq Epi: x / Non Sq Epi: x / Bacteria: x            MEDICATIONS  (STANDING):  albuterol/ipratropium for Nebulization 3 milliLiter(s) Nebulizer four times a day  apixaban 5 milliGRAM(s) Oral two times a day  carvedilol 25 milliGRAM(s) Oral every 12 hours  clopidogrel Tablet 75 milliGRAM(s) Oral daily  fluticasone propionate/ salmeterol 250-50 MICROgram(s) Diskus 1 Dose(s) Inhalation two times a day  furosemide   Injectable 40 milliGRAM(s) IV Push two times a day  magnesium oxide 400 milliGRAM(s) Oral daily  potassium chloride    Tablet ER 10 milliEquivalent(s) Oral daily  spironolactone 25 milliGRAM(s) Oral daily  valsartan 40 milliGRAM(s) Oral daily    MEDICATIONS  (PRN):  albuterol    90 MICROgram(s) HFA Inhaler 2 Puff(s) Inhalation every 6 hours PRN for shortness of breath and/or wheezing  guaiFENesin Oral Liquid (Sugar-Free) 200 milliGRAM(s) Oral every 6 hours PRN Cough

## 2025-05-13 NOTE — CONSULT NOTE ADULT - SUBJECTIVE AND OBJECTIVE BOX
HPI:  86 y/o M w/ PMH of HTN, dyslipidemia, aortic aneyrysm, PAF s/p afib ablation with PVI and posterior wall isolation (on 5/8/25 at [facility] w/ Dr. Odonnell) Eliquis, CAD s/ PCI, chronic systolic CHF, AICD, p/w SOB. Patient states that he had a cardiac ablation 1 day prior to arrival at [facility] and was discharged from [facility] around 1pm yesterday. States that he came home and around 7pm he developed SOB. Patient came to ED, and was treated acute CHF and placed on bipap. Currently patient is off of bipap, and patient states he feels much better. Denies CP, palpitatins, nausea, vomiting, cough, runny nose, sore throat.       PAST MEDICAL & SURGICAL HISTORY:  GERD (gastroesophageal reflux disease)      CAD (coronary artery disease)      Stented coronary artery  PTCA x 1 stent -2013      HTN (hypertension)      Obesity      Hypercholesterolemia      Osteoarthritis  hip      AF (atrial fibrillation)      Aortic aneurysm, thoracic      NICM (nonischemic cardiomyopathy)      History of PTCA 1  2013 x 1 stent      S/P tonsillectomy  11 y/o      H/O bilateral hip replacements  right 1999,  2003 left  revision right THR - 2010 and 2013      H/o total knee replacement, bilateral  right 1997, left 1999      S/P cataract surgery  b/l      H/O endoscopy  2017      H/O colonoscopy  2014          FAMILY HISTORY:  Family history of myocardial infarction (Mother)        SOCIAL HISTORY:        REVIEW OF SYSTEMS:  See HPI. Otherwise, 10 point ROS done and otherwise negative.    PHYSICAL EXAM:  Appearance: Normal	  HEENT:   Normal oral mucosa, PERRL, EOMI	  Lymphatic: No lymphadenopathy  Cardiovascular: Normal S1 S2, No JVD, No murmurs, No edema  Respiratory:   Psychiatry: A & O x 3, Mood & affect appropriate  Gastrointestinal:  Soft, Non-tender, + BS	  Skin: No rashes, No ecchymoses, No cyanosis	  Neurologic: Non-focal  Extremities: Normal range of motion, No clubbing, cyanosis or edema  Vascular: Peripheral pulses palpable 2+ bilaterally      T(C): 36.7 (05-13-25 @ 09:09), Max: 36.7 (05-13-25 @ 09:09)  HR: 78 (05-13-25 @ 09:09) (66 - 78)  BP: 112/73 (05-13-25 @ 09:09) (98/69 - 112/73)  RR: 18 (05-13-25 @ 09:09) (18 - 19)  SpO2: 97% (05-13-25 @ 09:09) (93% - 98%)  Wt(kg): --  I&O's Summary    12 May 2025 07:01  -  13 May 2025 07:00  --------------------------------------------------------  IN: 480 mL / OUT: 750 mL / NET: -270 mL        LABS:	 	      05-13    136  |  103  |  38[H]  ----------------------------<  176[H]  4.6   |  29  |  1.17  05-12    139  |  107  |  41[H]  ----------------------------<  109[H]  4.3   |  26  |  1.15    Ca    10.1      13 May 2025 09:26  Ca    10.2[H]      12 May 2025 07:34  Mg     2.1     05-13  Mg     1.9     05-12    Home Medications:  Albuterol (Eqv-Proventil HFA) 90 mcg/inh inhalation aerosol: 2 puff(s) inhaled every 6 hours as needed for  shortness of breath and/or wheezing (09 May 2025 22:49)  carvedilol 25 mg oral tablet: 1 tab(s) orally every 12 hours (09 May 2025 22:49)  clopidogrel 75 mg oral tablet: 1 tab(s) orally once a day (09 May 2025 22:49)  Eliquis 5 mg oral tablet: 1 tab(s) orally 2 times a day (09 May 2025 22:49)  Potassium Chloride (Eqv-Klor-Con 10) 10 mEq oral tablet, extended release: 1 tab(s) orally once a day (09 May 2025 22:49)  spironolactone 25 mg oral tablet: 1 tab(s) orally once a day (09 May 2025 22:49)  torsemide 10 mg oral tablet: 1 tab(s) orally once a day (09 May 2025 22:49)  valsartan 40 mg oral tablet: 1 tab(s) orally once a day (09 May 2025 22:49)      MEDICATIONS  (STANDING):  albuterol/ipratropium for Nebulization 3 milliLiter(s) Nebulizer four times a day  apixaban 5 milliGRAM(s) Oral two times a day  carvedilol 25 milliGRAM(s) Oral every 12 hours  clopidogrel Tablet 75 milliGRAM(s) Oral daily  fluticasone propionate/ salmeterol 250-50 MICROgram(s) Diskus 1 Dose(s) Inhalation two times a day  furosemide   Injectable 40 milliGRAM(s) IV Push two times a day  magnesium oxide 400 milliGRAM(s) Oral daily  potassium chloride    Tablet ER 10 milliEquivalent(s) Oral daily  spironolactone 25 milliGRAM(s) Oral daily  valsartan 40 milliGRAM(s) Oral daily

## 2025-05-13 NOTE — PROGRESS NOTE ADULT - SUBJECTIVE AND OBJECTIVE BOX
84 y/o M w/ history of HTN, , dyslipidemia, aortic aneyrysm, a-fib s/p ablation yesterday at [facility] w/ Dr. Odonnell on Eliquis, CAD s/ PCI, chronic systolic CHF, AICD, p/w SOB which started soon after discharge. Treated here for CHF exacerbation for which is recovering from.    He has a history of interstitial lung disease for many years, diagnosed by Dr. Blount-told not much can be done and related to smoking.  He feels like for a long time he has been experiencing exertional dyspnea.  Reports some mild joint pains in the hands in the AM with some stiffness  No Raynauds  No dry eyes  No muscular weakness  Currently works as a  which he has worked as most of his life.    Former smoker, 2 PPD x 30 years, quit 40 years ago  Has been in the same home for many years, no mold, no water leaks/damage  Two cats, no birds.    PSH: PCI, tonsillectomy, B/L hip replacement, B/L TKR, cataract surgery     Social Hx: Tobacco - quit 45 years ago, etoh - occasionally once per week, drugs - denies     Family Hx:  Denies pertinent hx     PHYSICAL EXAM:  General: Awake, alert, oriented, not in respiratory distress.   HEENT: Atraumatic, normocephalic, mallampati IV  Neck: No JVD. Trachea midline  Respiratory: Crackles at the bases bilaterally  Cardiovascular: S1 S2 normal. No murmurs, rubs or gallops appreciated  Abdomen: Soft, non-tender,    Extremities: Warm to touch.     PAST MEDICAL & SURGICAL HISTORY:  GERD (gastroesophageal reflux disease)    CAD (coronary artery disease)    Stented coronary artery  PTCA x 1 stent -2013      HTN (hypertension)      Obesity      Hypercholesterolemia      Osteoarthritis  hip      AF (atrial fibrillation)      Aortic aneurysm, thoracic      NICM (nonischemic cardiomyopathy)      History of PTCA 1  2013 x 1 stent      S/P tonsillectomy  11 y/o      H/O bilateral hip replacements  right 1999,  2003 left  revision right THR - 2010 and 2013      H/o total knee replacement, bilateral  right 1997, left 1999      S/P cataract surgery  b/l      H/O endoscopy  2017      H/O colonoscopy  2014          FAMILY HISTORY:  Family history of myocardial infarction (Mother)        SOCIAL HISTORY:  Smoking: Per HPI  Exposures: denies  Recent Travel: denies  Allergies    latex (Rash)  morphine (Vomiting)  neoprene - rash (Other)    Intolerances        OBJECTIVE:  ICU Vital Signs Last 24 Hrs  T(C): 36.3 (13 May 2025 12:35), Max: 36.7 (13 May 2025 09:09)  T(F): 97.4 (13 May 2025 12:35), Max: 98 (13 May 2025 09:09)  HR: 79 (13 May 2025 17:11) (61 - 80)  BP: 98/65 (13 May 2025 12:35) (98/65 - 112/73)  BP(mean): --  ABP: --  ABP(mean): --  RR: 18 (13 May 2025 12:35) (18 - 19)  SpO2: 92% (13 May 2025 17:11) (92% - 98%)    O2 Parameters below as of 13 May 2025 17:11  Patient On (Oxygen Delivery Method): room air            05-12-25 @ 07:01  -  05-13-25 @ 07:00  --------------------------------------------------------  IN: 480 mL / OUT: 750 mL / NET: -270 mL        LABS:    05-13    136  |  103  |  38[H]  ----------------------------<  176[H]  4.6   |  29  |  1.17    Ca    10.1      13 May 2025 09:26  Mg     2.1     05-13                RADIOLOGY:  Images Reviewed and interpreted myself

## 2025-05-13 NOTE — CONSULT NOTE ADULT - NS ATTEND AMEND GEN_ALL_CORE FT
Agreed with continuation of his current guidelines directed medical therapy for CHF. NSVT(likely focal mechanism) is noted. No anti-arrhythmic medication at this time.

## 2025-05-13 NOTE — PROGRESS NOTE ADULT - SUBJECTIVE AND OBJECTIVE BOX
Patient is a 85y old  Male who presents with a chief complaint of SOB (12 May 2025 14:45)      5/10/2025-  I saw Benito today for cardiac assessments as he's in [facility] now with heart failure. He's a pleasant 85-year-old with hypertension, dyslipidemia, known aortic aneurysm, atrial fibrillation ablation a few days ago at [facility], history of ischemic heart disease and cardiomyopathy with prior PCI, systolic heart failure, and ICD, on Eliquis, admitted with shortness of breath and found to have heart failure. He was placed on BiPAP, given IV Lasix, and is diuresing well without issues.    He's seen resting comfortably in bed, no immediate distress, no current chest pain, shortness of breath, palpitations, syncope, or edema. He had significant troponin elevation, likely on the basis of demand ischemia and recent AFib ablation and not from a coronary ischemic event. Although ECG is uninterpretable, showing left bundle branch block, he does have evidence of brief bursts of non-sustained VT on telemetry, which is asymptomatic.      PSH: PCI, tonsillectomy, B/L hip replacement, B/L TKR, cataract surgery     Social Hx: Tobacco - quit 45 years ago, etoh - occasionally once per week, drugs - denies     Family Hx:  Denies pertinent hx     ALLERGIES:  Allergies    latex (Rash)  morphine (Vomiting)  neoprene - rash (Other)        Followup HPI:    5/11-Benito is seen today resting comfortably in the chair in no immediate distress but had some shortness of breath last evening requiring some albuterol. He feels a bit lethargic today but is diuresing well and denies current chest pains, palpitations, syncope, or edema.  5/12- Coughing up green sputum. Feels breathless and is wheezing intermittently. States he feels no better than on admission. Denies  chest pain.  5/13-Less wheeze, cough and shortness of breath. 27 beats of asymptomatic VT on 5/12 at 18:29 hours.    PAST MEDICAL & SURGICAL HISTORY:  GERD (gastroesophageal reflux disease)      CAD (coronary artery disease)      Stented coronary artery  PTCA x 1 stent -2013      HTN (hypertension)      Obesity      Hypercholesterolemia      Osteoarthritis  hip      AF (atrial fibrillation)      Aortic aneurysm, thoracic      NICM (nonischemic cardiomyopathy)      History of PTCA 1  2013 x 1 stent      S/P tonsillectomy  13 y/o      H/O bilateral hip replacements  right 1999,  2003 left  revision right THR - 2010 and 2013      H/o total knee replacement, bilateral  right 1997, left 1999      S/P cataract surgery  b/l      H/O endoscopy  2017      H/O colonoscopy  2014          Review of symptoms:  Negative except for as noted in today's HPI.      MEDICATIONS  (STANDING):  albuterol/ipratropium for Nebulization 3 milliLiter(s) Nebulizer four times a day  apixaban 5 milliGRAM(s) Oral two times a day  carvedilol 25 milliGRAM(s) Oral every 12 hours  clopidogrel Tablet 75 milliGRAM(s) Oral daily  furosemide   Injectable 40 milliGRAM(s) IV Push two times a day  potassium chloride    Tablet ER 10 milliEquivalent(s) Oral daily  spironolactone 25 milliGRAM(s) Oral daily  valsartan 40 milliGRAM(s) Oral daily    MEDICATIONS  (PRN):  albuterol    90 MICROgram(s) HFA Inhaler 2 Puff(s) Inhalation every 6 hours PRN for shortness of breath and/or wheezing  guaiFENesin Oral Liquid (Sugar-Free) 200 milliGRAM(s) Oral every 6 hours PRN Cough          Vital Signs Last 24 Hrs  T(C): 36.3 (13 May 2025 05:33), Max: 36.6 (12 May 2025 12:40)  T(F): 97.4 (13 May 2025 05:33), Max: 97.8 (12 May 2025 12:40)  HR: 66 (13 May 2025 05:33) (66 - 76)  BP: 98/69 (13 May 2025 05:33) (94/48 - 99/78)  BP(mean): --  RR: 18 (13 May 2025 05:33) (18 - 19)  SpO2: 98% (13 May 2025 05:33) (93% - 98%)    Parameters below as of 13 May 2025 05:33  Patient On (Oxygen Delivery Method): room air        I&O's Summary    12 May 2025 07:01  -  13 May 2025 07:00  --------------------------------------------------------  IN: 480 mL / OUT: 750 mL / NET: -270 mL        PHYSICAL EXAM  General Appearance: comfortable  HEENT:   Neck:   Lungs: scant bibasilar crackles  Heart: no murmur or gallop  Abdomen:   Extremities: no edema  Neurologic:       INTERPRETATION OF TELEMETRY: RSR, occasional V paced. 27 beats of VT on 5/12.    ECG:    LABS:      05-12    139  |  107  |  41[H]  ----------------------------<  109[H]  4.3   |  26  |  1.15    Ca    10.2[H]      12 May 2025 07:34  Mg     1.9     05-12          Lipid Panel  186  46  --  99        Urinalysis Basic - ( 12 May 2025 07:34 )    Color: x / Appearance: x / SG: x / pH: x  Gluc: 109 mg/dL / Ketone: x  / Bili: x / Urobili: x   Blood: x / Protein: x / Nitrite: x   Leuk Esterase: x / RBC: x / WBC x   Sq Epi: x / Non Sq Epi: x / Bacteria: x            RADIOLOGY & ADDITIONAL STUDIES:    IMPRESSION:    1. Severe nonischemic cardiomyopathy with CHF. Symptomatically improved.   2.Interstitial lung disease and COPD appear to be causing some of his symptoms.  3.S/P AF ablation 5/8/2025.  4.ICD in situ since 5/9/2024. 27 beats of VT without symptoms on 5/12. Mg level low normal   5. CAD VNIOD RCA 2013. Troponin elevation due to AF ablation and oxygen demand supply mismatch. No angina or ACS.  6.Thoracic ascending aortic aneurysm 4.9 cm by CT 1/29/2025. Stable.     PLAN:  Will consult EP regarding VT.  Continue  furosemide 40 mg IV bid. Add Mg oxide to keep Mg over 2.0.  Continue with  spironolactone, valsartan, carvedilol, Plavix, Eliquis, KCL. Suggest consider pulmonary consultation. He may benefit from adding additional meds for bronchospasm or pulmonary infection.

## 2025-05-13 NOTE — CONSULT NOTE ADULT - ASSESSMENT
84yo male with PMH of HTN, dyslipidemia, aortic aneyrysm, CAD s/p PCI, ischemic cardiomyopathy with ICD implant,  PAF s/p afib ablation on 5/8/25 at [facility], presented a day later with sob and admitted with CHF exacerbation.   Tele noted with episode of 27beat of NSVT at rate approximately 170bpm, pt was awake and asymptomatic, pt on coreg 25mg bid    ICD interrogation did not reveal any episodes of sustained tachyarrhythmias, NSVT or treated episodes, and this episode likely did not meet rate criteria for detection.   Would continue current medical regiment.   CHF treatment per primary cardiology team.

## 2025-05-14 LAB
ANION GAP SERPL CALC-SCNC: 5 MMOL/L — SIGNIFICANT CHANGE UP (ref 5–17)
BUN SERPL-MCNC: 55 MG/DL — HIGH (ref 7–23)
CALCIUM SERPL-MCNC: 10 MG/DL — SIGNIFICANT CHANGE UP (ref 8.5–10.1)
CHLORIDE SERPL-SCNC: 102 MMOL/L — SIGNIFICANT CHANGE UP (ref 96–108)
CO2 SERPL-SCNC: 27 MMOL/L — SIGNIFICANT CHANGE UP (ref 22–31)
CREAT SERPL-MCNC: 1.31 MG/DL — HIGH (ref 0.5–1.3)
EGFR: 53 ML/MIN/1.73M2 — LOW
EGFR: 53 ML/MIN/1.73M2 — LOW
GLUCOSE SERPL-MCNC: 110 MG/DL — HIGH (ref 70–99)
HCT VFR BLD CALC: 41.7 % — SIGNIFICANT CHANGE UP (ref 39–50)
HGB BLD-MCNC: 13.8 G/DL — SIGNIFICANT CHANGE UP (ref 13–17)
MAGNESIUM SERPL-MCNC: 2.2 MG/DL — SIGNIFICANT CHANGE UP (ref 1.6–2.6)
MCHC RBC-ENTMCNC: 32.8 PG — SIGNIFICANT CHANGE UP (ref 27–34)
MCHC RBC-ENTMCNC: 33.1 G/DL — SIGNIFICANT CHANGE UP (ref 32–36)
MCV RBC AUTO: 99 FL — SIGNIFICANT CHANGE UP (ref 80–100)
NRBC # BLD AUTO: 0 K/UL — SIGNIFICANT CHANGE UP (ref 0–0)
NRBC # FLD: 0 K/UL — SIGNIFICANT CHANGE UP (ref 0–0)
NRBC BLD AUTO-RTO: 0 /100 WBCS — SIGNIFICANT CHANGE UP (ref 0–0)
PHOSPHATE SERPL-MCNC: 3.5 MG/DL — SIGNIFICANT CHANGE UP (ref 2.5–4.5)
PLATELET # BLD AUTO: 175 K/UL — SIGNIFICANT CHANGE UP (ref 150–400)
PMV BLD: 10.1 FL — SIGNIFICANT CHANGE UP (ref 7–13)
POTASSIUM SERPL-MCNC: 4.2 MMOL/L — SIGNIFICANT CHANGE UP (ref 3.5–5.3)
POTASSIUM SERPL-SCNC: 4.2 MMOL/L — SIGNIFICANT CHANGE UP (ref 3.5–5.3)
RBC # BLD: 4.21 M/UL — SIGNIFICANT CHANGE UP (ref 4.2–5.8)
RBC # FLD: 13.5 % — SIGNIFICANT CHANGE UP (ref 10.3–14.5)
SODIUM SERPL-SCNC: 134 MMOL/L — LOW (ref 135–145)
WBC # BLD: 13.7 K/UL — HIGH (ref 3.8–10.5)
WBC # FLD AUTO: 13.7 K/UL — HIGH (ref 3.8–10.5)

## 2025-05-14 PROCEDURE — 99232 SBSQ HOSP IP/OBS MODERATE 35: CPT

## 2025-05-14 RX ORDER — PREDNISONE 20 MG/1
40 TABLET ORAL DAILY
Refills: 0 | Status: DISCONTINUED | OUTPATIENT
Start: 2025-05-14 | End: 2025-05-16

## 2025-05-14 RX ORDER — FUROSEMIDE 10 MG/ML
20 INJECTION INTRAMUSCULAR; INTRAVENOUS
Refills: 0 | Status: DISCONTINUED | OUTPATIENT
Start: 2025-05-14 | End: 2025-05-16

## 2025-05-14 RX ORDER — SPIRONOLACTONE 25 MG
25 TABLET ORAL DAILY
Refills: 0 | Status: DISCONTINUED | OUTPATIENT
Start: 2025-05-14 | End: 2025-05-16

## 2025-05-14 RX ORDER — ALBUMIN (HUMAN) 12.5 G/50ML
50 INJECTION, SOLUTION INTRAVENOUS EVERY 8 HOURS
Refills: 0 | Status: DISCONTINUED | OUTPATIENT
Start: 2025-05-14 | End: 2025-05-14

## 2025-05-14 RX ORDER — MIDODRINE HYDROCHLORIDE 5 MG/1
10 TABLET ORAL ONCE
Refills: 0 | Status: COMPLETED | OUTPATIENT
Start: 2025-05-14 | End: 2025-05-14

## 2025-05-14 RX ADMIN — Medication 40 MILLIGRAM(S): at 21:49

## 2025-05-14 RX ADMIN — Medication 1 DOSE(S): at 20:10

## 2025-05-14 RX ADMIN — IPRATROPIUM BROMIDE AND ALBUTEROL SULFATE 3 MILLILITER(S): .5; 2.5 SOLUTION RESPIRATORY (INHALATION) at 07:57

## 2025-05-14 RX ADMIN — IPRATROPIUM BROMIDE AND ALBUTEROL SULFATE 3 MILLILITER(S): .5; 2.5 SOLUTION RESPIRATORY (INHALATION) at 16:38

## 2025-05-14 RX ADMIN — IPRATROPIUM BROMIDE AND ALBUTEROL SULFATE 3 MILLILITER(S): .5; 2.5 SOLUTION RESPIRATORY (INHALATION) at 20:10

## 2025-05-14 RX ADMIN — ALBUMIN (HUMAN) 50 MILLILITER(S): 12.5 INJECTION, SOLUTION INTRAVENOUS at 07:39

## 2025-05-14 RX ADMIN — Medication 10 MILLIEQUIVALENT(S): at 09:31

## 2025-05-14 RX ADMIN — APIXABAN 5 MILLIGRAM(S): 2.5 TABLET, FILM COATED ORAL at 09:32

## 2025-05-14 RX ADMIN — DEXTROMETHORPHAN HBR, GUAIFENESIN 200 MILLIGRAM(S): 200 LIQUID ORAL at 18:26

## 2025-05-14 RX ADMIN — APIXABAN 5 MILLIGRAM(S): 2.5 TABLET, FILM COATED ORAL at 21:48

## 2025-05-14 RX ADMIN — MIDODRINE HYDROCHLORIDE 10 MILLIGRAM(S): 5 TABLET ORAL at 07:01

## 2025-05-14 RX ADMIN — IPRATROPIUM BROMIDE AND ALBUTEROL SULFATE 3 MILLILITER(S): .5; 2.5 SOLUTION RESPIRATORY (INHALATION) at 11:17

## 2025-05-14 RX ADMIN — Medication 1 DOSE(S): at 07:57

## 2025-05-14 RX ADMIN — CLOPIDOGREL BISULFATE 75 MILLIGRAM(S): 75 TABLET, FILM COATED ORAL at 09:31

## 2025-05-14 RX ADMIN — Medication 400 MILLIGRAM(S): at 09:32

## 2025-05-14 NOTE — PROGRESS NOTE ADULT - SUBJECTIVE AND OBJECTIVE BOX
5/10: no complaints  no cp/sob    5/11: feeling better today    5/12: c/o some cough and sob last night  lasix increased to bid    5/13: feeling better  decreasing cough  27 beats NSVT 5/12 5/14: hypotensive overnight  got iv albumin and midodrine overnight  feels weak  BP still low in morning today  both BUN and Cr increased    PHYSICAL EXAM:    Vital Signs Last 24 Hrs  T(C): 36.4 (14 May 2025 15:26), Max: 36.9 (13 May 2025 20:49)  T(F): 97.5 (14 May 2025 15:26), Max: 98.4 (13 May 2025 20:49)  HR: 79 (14 May 2025 15:26) (65 - 85)  BP: 105/63 (14 May 2025 15:26) (86/54 - 130/42)  BP(mean): --  RR: 18 (14 May 2025 15:26) (17 - 18)  SpO2: 94% (14 May 2025 15:26) (92% - 98%)    Parameters below as of 14 May 2025 15:26  Patient On (Oxygen Delivery Method): room air        Constitutional: Weak and ill appearing  HEENT: Atraumatic, EMIL,   Respiratory: Breath Sounds normal, no rhonchi/wheeze  Cardiovascular: N S1S2;   Gastrointestinal: Abdomen soft, non tender, Bowel Sounds present  Extremities: No edema, peripheral pulses present  Neurological: AAO x 3, no gross focal motor deficits  Skin: Non cellulitic, no rash, ulcers  Lymph Nodes: No lymphadenopathy noted  Back: No CVA tenderness   Musculoskeletal: non tender  Breasts: Deferred  Genitourinary: deferred  Rectal: Deferred    All Labs/EKG/Radiology/Meds reviewed by me.     Lab Results:  CBC  CBC Full  -  ( 14 May 2025 08:08 )  WBC Count : 13.70 K/uL  RBC Count : 4.21 M/uL  Hemoglobin : 13.8 g/dL  Hematocrit : 41.7 %  Platelet Count - Automated : 175 K/uL  Mean Cell Volume : 99.0 fl  Mean Cell Hemoglobin : 32.8 pg  Mean Cell Hemoglobin Concentration : 33.1 g/dL  Auto Neutrophil # : x  Auto Lymphocyte # : x  Auto Monocyte # : x  Auto Eosinophil # : x  Auto Basophil # : x  Auto Neutrophil % : x  Auto Lymphocyte % : x  Auto Monocyte % : x  Auto Eosinophil % : x  Auto Basophil % : x    .		Differential:	[] Automated		[] Manual  Chemistry                        13.8   13.70 )-----------( 175      ( 14 May 2025 08:08 )             41.7     05-14    134[L]  |  102  |  55[H]  ----------------------------<  110[H]  4.2   |  27  |  1.31[H]    Ca    10.0      14 May 2025 08:08  Phos  3.5     05-14  Mg     2.2     05-14          Urinalysis Basic - ( 14 May 2025 08:08 )    Color: x / Appearance: x / SG: x / pH: x  Gluc: 110 mg/dL / Ketone: x  / Bili: x / Urobili: x   Blood: x / Protein: x / Nitrite: x   Leuk Esterase: x / RBC: x / WBC x   Sq Epi: x / Non Sq Epi: x / Bacteria: x              MICROBIOLOGY/CULTURES:  Culture Results:   No growth at 4 days (05-09 @ 20:33)  Culture Results:   No growth at 4 days (05-09 @ 19:53)      RADIOLOGY RESULTS:    < from: CT Chest No Cont (05.13.25 @ 10:03) >  IMPRESSION:  Redemonstration of architectural distortion of lung parenchyma,   subpleural reticulations and fibrotic changes, groundglass opacities, and   peribronchial wall thickening, likely suggestive of fibrotic lung disease.  There is no evidence of focal consolidation  Severe calcification of coronary arteries  Ascending thoracic aorta ectasia    < end of copied text >      MEDICATIONS  (STANDING):  albuterol/ipratropium for Nebulization 3 milliLiter(s) Nebulizer four times a day  apixaban 5 milliGRAM(s) Oral two times a day  carvedilol 25 milliGRAM(s) Oral every 12 hours  clopidogrel Tablet 75 milliGRAM(s) Oral daily  fluticasone propionate/ salmeterol 250-50 MICROgram(s) Diskus 1 Dose(s) Inhalation two times a day  furosemide    Tablet 20 milliGRAM(s) Oral two times a day  magnesium oxide 400 milliGRAM(s) Oral daily  potassium chloride    Tablet ER 10 milliEquivalent(s) Oral daily  spironolactone 25 milliGRAM(s) Oral daily  valsartan 40 milliGRAM(s) Oral daily    MEDICATIONS  (PRN):  albuterol    90 MICROgram(s) HFA Inhaler 2 Puff(s) Inhalation every 6 hours PRN for shortness of breath and/or wheezing  guaiFENesin Oral Liquid (Sugar-Free) 200 milliGRAM(s) Oral every 6 hours PRN Cough

## 2025-05-14 NOTE — PROGRESS NOTE ADULT - SUBJECTIVE AND OBJECTIVE BOX
Overnight had coughing fit which was difficult to break. Happens when he is moved around.   No change in his shortness of breath.       PHYSICAL EXAM:  General: Awake, alert, oriented, not in respiratory distress.   HEENT: Atraumatic, normocephalic, mallampati IV  Neck: No JVD. Trachea midline  Respiratory: Crackles at the bases bilaterally  Cardiovascular: S1 S2 normal. No murmurs, rubs or gallops appreciated  Abdomen: Soft, non-tender,    Extremities: Warm to touch.     Labs for connective tissue disease pending.

## 2025-05-14 NOTE — PROGRESS NOTE ADULT - ASSESSMENT
85 year old male with evidence of interstitial lung diease.  There is evidence of subpleural reticulation and worsening right sided disease but there is evidence of bronchocentricity and GGO that speaks to alternative diagnosis. While the patient has not progressed significantly since 2024, from 2023 there appears to be subtle progression. I suspect most of his debility is cardiac related but since this is unlikelly to IPF can trial steroids to assess symptomatic improvement.     Ordered for prednisone 40 mg daily  If patient is ready for discharge can see him as an outpatient to perform ILD work up- labs ordered  Based on CT scan pattern suspect perhaps autoimmune disease  Depending on duration of prednisone, patient may ultimately need bactrim ppx.  Given cough at night time especially with movement addition of PPI made at bedtime.

## 2025-05-14 NOTE — PROGRESS NOTE ADULT - ASSESSMENT
86 y/o M w/ PMH of HTN, dyslipidemia, aortic aneurysm, a-fib s/p ablation on 5/8 at [facility] w/ Dr. Odonnell) on Eliquis, CAD s/ PCI, chronic systolic CHF, AICD, p/w SOB    *Acute on chronic systolic CHF   -CXR: Mild to moderate CHF versus bilateral infiltrates  -IV lasix bid  ECHO: severely decreased LV function  -I/Os + Daily weights   -On BB / ARB with holding parameters  -Cardio consult  -Tele monitoring  pulmo consult  duonebs  CT chest : no PNA  5/15 switch to po lasix 20 mg bid ( at home pt was on torsemide 10 mg daily) for hypotension and increasing BUN/Cr; 55/1.31    *Elevated troponins s/p cardiac ablation for a-fib   *H/o CAD   -As per Dr. Henriquez, she discussed case w/ cardio, who stated that patient did not need heparin drip, as he is on Eliquis. Discussed w/ ICU PA Madiha regarding admitting patient to CCU for significantly elevated troponins. Madiha stated that she discussed case w/ on call cardiologist Dr Hodges, and states that he is okay to go to Tele floor.   -EKG: LBBB (Old)   -TTE    * Hypotension: decreased lasix to 20 mg po bid  other BP meds with holding parameters  orthostatic vitals in am    * Pulmonary Fibrosis: out pt work up  r/o auto immune disease    * Increasing BUN/Cr:  changed to po lasix 20 mg  bid  daily BMP      * NSVT: 27 beats 0n 5/12  has AICD  EP cx appreciated  cont current mx    *H/o HTN / dyslipidemia / aortic aneurysm   -C/w home meds and f/u outpatient for further management     *DVT ppx  -On Eliquis     DISPO: monitor BP; BUN/Cr  oorthostatics

## 2025-05-15 LAB
ANION GAP SERPL CALC-SCNC: 5 MMOL/L — SIGNIFICANT CHANGE UP (ref 5–17)
ANTI-RIBONUCLEAR PROTEIN: <0.2 AI — SIGNIFICANT CHANGE UP
BUN SERPL-MCNC: 39 MG/DL — HIGH (ref 7–23)
CALCIUM SERPL-MCNC: 10.4 MG/DL — HIGH (ref 8.5–10.1)
CCP IGG SERPL-ACNC: <8 U/ML — SIGNIFICANT CHANGE UP
CHLORIDE SERPL-SCNC: 105 MMOL/L — SIGNIFICANT CHANGE UP (ref 96–108)
CO2 SERPL-SCNC: 27 MMOL/L — SIGNIFICANT CHANGE UP (ref 22–31)
CREAT SERPL-MCNC: 1.06 MG/DL — SIGNIFICANT CHANGE UP (ref 0.5–1.3)
CULTURE RESULTS: SIGNIFICANT CHANGE UP
CULTURE RESULTS: SIGNIFICANT CHANGE UP
DSDNA AB FLD-ACNC: <0.2 AI — SIGNIFICANT CHANGE UP
EGFR: 69 ML/MIN/1.73M2 — SIGNIFICANT CHANGE UP
EGFR: 69 ML/MIN/1.73M2 — SIGNIFICANT CHANGE UP
ENA SM AB FLD QL: <0.2 AI — SIGNIFICANT CHANGE UP
ENA SS-A AB FLD IA-ACNC: <0.2 AI — SIGNIFICANT CHANGE UP
GLUCOSE SERPL-MCNC: 118 MG/DL — HIGH (ref 70–99)
POTASSIUM SERPL-MCNC: 4.4 MMOL/L — SIGNIFICANT CHANGE UP (ref 3.5–5.3)
POTASSIUM SERPL-SCNC: 4.4 MMOL/L — SIGNIFICANT CHANGE UP (ref 3.5–5.3)
RF+CCP IGG SER-IMP: NEGATIVE — SIGNIFICANT CHANGE UP
RHEUMATOID FACT SERPL-ACNC: <10 IU/ML — SIGNIFICANT CHANGE UP (ref 0–13)
SODIUM SERPL-SCNC: 137 MMOL/L — SIGNIFICANT CHANGE UP (ref 135–145)
SPECIMEN SOURCE: SIGNIFICANT CHANGE UP
SPECIMEN SOURCE: SIGNIFICANT CHANGE UP

## 2025-05-15 PROCEDURE — 99233 SBSQ HOSP IP/OBS HIGH 50: CPT

## 2025-05-15 RX ORDER — DEXTROMETHORPHAN HBR, GUAIFENESIN 200 MG/10ML
1200 LIQUID ORAL EVERY 12 HOURS
Refills: 0 | Status: DISCONTINUED | OUTPATIENT
Start: 2025-05-15 | End: 2025-05-16

## 2025-05-15 RX ORDER — METOPROLOL SUCCINATE 50 MG/1
25 TABLET, EXTENDED RELEASE ORAL AT BEDTIME
Refills: 0 | Status: DISCONTINUED | OUTPATIENT
Start: 2025-05-15 | End: 2025-05-16

## 2025-05-15 RX ADMIN — FUROSEMIDE 20 MILLIGRAM(S): 10 INJECTION INTRAMUSCULAR; INTRAVENOUS at 05:29

## 2025-05-15 RX ADMIN — DEXTROMETHORPHAN HBR, GUAIFENESIN 1200 MILLIGRAM(S): 200 LIQUID ORAL at 21:02

## 2025-05-15 RX ADMIN — DEXTROMETHORPHAN HBR, GUAIFENESIN 200 MILLIGRAM(S): 200 LIQUID ORAL at 05:34

## 2025-05-15 RX ADMIN — IPRATROPIUM BROMIDE AND ALBUTEROL SULFATE 3 MILLILITER(S): .5; 2.5 SOLUTION RESPIRATORY (INHALATION) at 07:48

## 2025-05-15 RX ADMIN — IPRATROPIUM BROMIDE AND ALBUTEROL SULFATE 3 MILLILITER(S): .5; 2.5 SOLUTION RESPIRATORY (INHALATION) at 20:29

## 2025-05-15 RX ADMIN — CLOPIDOGREL BISULFATE 75 MILLIGRAM(S): 75 TABLET, FILM COATED ORAL at 09:36

## 2025-05-15 RX ADMIN — DEXTROMETHORPHAN HBR, GUAIFENESIN 200 MILLIGRAM(S): 200 LIQUID ORAL at 21:34

## 2025-05-15 RX ADMIN — CARVEDILOL 25 MILLIGRAM(S): 3.12 TABLET, FILM COATED ORAL at 09:34

## 2025-05-15 RX ADMIN — DEXTROMETHORPHAN HBR, GUAIFENESIN 1200 MILLIGRAM(S): 200 LIQUID ORAL at 13:15

## 2025-05-15 RX ADMIN — FUROSEMIDE 20 MILLIGRAM(S): 10 INJECTION INTRAMUSCULAR; INTRAVENOUS at 13:15

## 2025-05-15 RX ADMIN — Medication 400 MILLIGRAM(S): at 09:36

## 2025-05-15 RX ADMIN — PREDNISONE 40 MILLIGRAM(S): 20 TABLET ORAL at 09:37

## 2025-05-15 RX ADMIN — IPRATROPIUM BROMIDE AND ALBUTEROL SULFATE 3 MILLILITER(S): .5; 2.5 SOLUTION RESPIRATORY (INHALATION) at 16:30

## 2025-05-15 RX ADMIN — IPRATROPIUM BROMIDE AND ALBUTEROL SULFATE 3 MILLILITER(S): .5; 2.5 SOLUTION RESPIRATORY (INHALATION) at 12:19

## 2025-05-15 RX ADMIN — Medication 1 DOSE(S): at 20:29

## 2025-05-15 RX ADMIN — APIXABAN 5 MILLIGRAM(S): 2.5 TABLET, FILM COATED ORAL at 21:02

## 2025-05-15 RX ADMIN — METOPROLOL SUCCINATE 25 MILLIGRAM(S): 50 TABLET, EXTENDED RELEASE ORAL at 21:02

## 2025-05-15 RX ADMIN — APIXABAN 5 MILLIGRAM(S): 2.5 TABLET, FILM COATED ORAL at 09:34

## 2025-05-15 RX ADMIN — Medication 40 MILLIGRAM(S): at 21:02

## 2025-05-15 RX ADMIN — Medication 1 DOSE(S): at 07:49

## 2025-05-15 NOTE — PROGRESS NOTE ADULT - SUBJECTIVE AND OBJECTIVE BOX
Patient is a 85y old  Male who presents with a chief complaint of SOB (14 May 2025 20:57)      5/10/2025-  I saw Benito today for cardiac assessments as he's in [facility] now with heart failure. He's a pleasant 85-year-old with hypertension, dyslipidemia, known aortic aneurysm, atrial fibrillation ablation a few days ago at [facility], history of ischemic heart disease and cardiomyopathy with prior PCI, systolic heart failure, and ICD, on Eliquis, admitted with shortness of breath and found to have heart failure. He was placed on BiPAP, given IV Lasix, and is diuresing well without issues.    He's seen resting comfortably in bed, no immediate distress, no current chest pain, shortness of breath, palpitations, syncope, or edema. He had significant troponin elevation, likely on the basis of demand ischemia and recent AFib ablation and not from a coronary ischemic event. Although ECG is uninterpretable, showing left bundle branch block, he does have evidence of brief bursts of non-sustained VT on telemetry, which is asymptomatic.      PSH: PCI, tonsillectomy, B/L hip replacement, B/L TKR, cataract surgery     Social Hx: Tobacco - quit 45 years ago, etoh - occasionally once per week, drugs - denies     Family Hx:  Denies pertinent hx     ALLERGIES:  Allergies    latex (Rash)  morphine (Vomiting)  neoprene - rash (Other)      Followup HPI:      5/11-Benito is seen today resting comfortably in the chair in no immediate distress but had some shortness of breath last evening requiring some albuterol. He feels a bit lethargic today but is diuresing well and denies current chest pains, palpitations, syncope, or edema.  5/12- Coughing up green sputum. Feels breathless and is wheezing intermittently. States he feels no better than on admission. Denies  chest pain.  5/13-Less wheeze, cough and shortness of breath. 27 beats of asymptomatic VT on 5/12 at 18:29 hours.  5/15- Still coughing sporadically. Not requiring supplemental oxygen. No chest pain. EP note and ICD interrogation noted. No change in medical or device  therapy advised.         PAST MEDICAL & SURGICAL HISTORY:  GERD (gastroesophageal reflux disease)      CAD (coronary artery disease)      Stented coronary artery  PTCA x 1 stent -2013      HTN (hypertension)      Obesity      Hypercholesterolemia      Osteoarthritis  hip      AF (atrial fibrillation)      Aortic aneurysm, thoracic      NICM (nonischemic cardiomyopathy)      History of PTCA 1  2013 x 1 stent      S/P tonsillectomy  11 y/o      H/O bilateral hip replacements  right 1999,  2003 left  revision right THR - 2010 and 2013      H/o total knee replacement, bilateral  right 1997, left 1999      S/P cataract surgery  b/l      H/O endoscopy  2017      H/O colonoscopy  2014          Review of symptoms:  Negative except for as noted in today's HPI.      MEDICATIONS  (STANDING):  albuterol/ipratropium for Nebulization 3 milliLiter(s) Nebulizer four times a day  apixaban 5 milliGRAM(s) Oral two times a day  carvedilol 25 milliGRAM(s) Oral every 12 hours  clopidogrel Tablet 75 milliGRAM(s) Oral daily  fluticasone propionate/ salmeterol 250-50 MICROgram(s) Diskus 1 Dose(s) Inhalation two times a day  furosemide    Tablet 20 milliGRAM(s) Oral two times a day  magnesium oxide 400 milliGRAM(s) Oral daily  pantoprazole    Tablet 40 milliGRAM(s) Oral at bedtime  potassium chloride    Tablet ER 10 milliEquivalent(s) Oral daily  predniSONE   Tablet 40 milliGRAM(s) Oral daily  spironolactone 25 milliGRAM(s) Oral daily  valsartan 40 milliGRAM(s) Oral daily    MEDICATIONS  (PRN):  albuterol    90 MICROgram(s) HFA Inhaler 2 Puff(s) Inhalation every 6 hours PRN for shortness of breath and/or wheezing  guaiFENesin Oral Liquid (Sugar-Free) 200 milliGRAM(s) Oral every 6 hours PRN Cough          Vital Signs Last 24 Hrs  T(C): 36.3 (15 May 2025 05:28), Max: 36.9 (14 May 2025 08:40)  T(F): 97.4 (15 May 2025 05:28), Max: 98.4 (14 May 2025 08:40)  HR: 85 (15 May 2025 05:28) (65 - 85)  BP: 99/76 (15 May 2025 05:28) (86/57 - 109/73)  BP(mean): --  RR: 18 (15 May 2025 05:28) (17 - 18)  SpO2: 97% (15 May 2025 05:28) (94% - 98%)    Parameters below as of 15 May 2025 05:28  Patient On (Oxygen Delivery Method): room air        I&O's Summary      PHYSICAL EXAM  General Appearance: comfortable  HEENT:   Neck:   Lungs: scant bibasilar crackles  Heart: no murmur or gallop  Abdomen:   Extremities: no edema  Neurologic:       INTERPRETATION OF TELEMETRY: RSR, 1 ventriclar couplet    ECG:    LABS:                          13.8   13.70 )-----------( 175      ( 14 May 2025 08:08 )             41.7     05-14    134[L]  |  102  |  55[H]  ----------------------------<  110[H]  4.2   |  27  |  1.31[H]    Ca    10.0      14 May 2025 08:08  Phos  3.5     05-14  Mg     2.2     05-14                Urinalysis Basic - ( 14 May 2025 08:08 )    Color: x / Appearance: x / SG: x / pH: x  Gluc: 110 mg/dL / Ketone: x  / Bili: x / Urobili: x   Blood: x / Protein: x / Nitrite: x   Leuk Esterase: x / RBC: x / WBC x   Sq Epi: x / Non Sq Epi: x / Bacteria: x            RADIOLOGY & ADDITIONAL STUDIES:      ACC: [id number] EXAM:  CT CHEST   ORDERED BY: KARLIE CLANCY     PROCEDURE DATE:  05/13/2025        IMPRESSION:  Redemonstration of architectural distortion of lung parenchyma,   subpleural reticulations and fibrotic changes, groundglass opacities, and   peribronchial wall thickening, likely suggestive of fibrotic lung disease.  There is no evidence of focal consolidation  Severe calcification of coronary arteries  Ascending thoracic aorta ectasia      --- End of Report ---    LOC HART DO; Resident Radiologist  This document has been electronically signed.  CHRIS OLMSTEAD MD; Attending Radiologist  This document has been electronically signed. May 13 2025  3:54PM      IMPRESSION:    1. Severe nonischemic cardiomyopathy with CHF. Symptomatically improved and now euvolemic to mildly hypovolemic based on increase in renal parameters and relatively low BP. Agree with de-escalating diuretic therapy with furosemide 20 mg po bid.   2.Interstitial lung disease and COPD appear to be causing some of his symptoms. Now on prednisone, albuterol ipratropium, salmeterol fluticasone.  3.S/P AF ablation 5/8/2025. Stable. In RSR.   4.ICD in situ since 5/9/2024. 27 beats of VT without symptoms on 5/12. Mg level 2.2 is acceptable. Normal ICD function.   5. CAD VINOD RCA 2013. Troponin elevation due to AF ablation and oxygen demand supply mismatch. No angina or ACS.  6.Thoracic ascending aortic aneurysm 4.9 cm by CT 1/29/2025. Stable.   PLAN:  Continue present cardiac medication. Consider transition  to out patient care.

## 2025-05-15 NOTE — PROGRESS NOTE ADULT - SUBJECTIVE AND OBJECTIVE BOX
HOSPITALIST PROGRESS NOTE    5/10: no complaints  no cp/sob    5/11: feeling better today    5/12: c/o some cough and sob last night  lasix increased to bid    5/13: feeling better  decreasing cough  27 beats NSVT 5/12 5/14: hypotensive overnight  got iv albumin and midodrine overnight  feels weak  BP still low in morning today  both BUN and Cr increased    5/15: Patient seen and examined at bedside. Feels better today. Orthostatics improving. Continue to adjust medications.     ROS: All 10 systems reviewed and found to be negative with the exception of what has been described above.     VITAL SIGNS:  Vital Signs Last 24 Hrs  T(C): 36.6 (15 May 2025 15:41), Max: 36.7 (15 May 2025 09:03)  T(F): 97.8 (15 May 2025 15:41), Max: 98.1 (15 May 2025 09:03)  HR: 91 (15 May 2025 15:41) (75 - 91)  BP: 115/79 (15 May 2025 15:41) (99/76 - 115/79)  BP(mean): --  RR: 18 (15 May 2025 15:41) (18 - 18)  SpO2: 97% (15 May 2025 15:41) (90% - 97%)    Parameters below as of 15 May 2025 15:41  Patient On (Oxygen Delivery Method): room air      PHYSICAL EXAM:  General: No acute distress  HEENT: NC/AT; PERRL, anicteric sclera; MMM  Neck: Supple  Cardiovascular: +S1/S2, RRR, no murmurs, rubs, gallops  Respiratory: CTA B/L; no W/R/R  Gastrointestinal: soft, NT/ND; +BSx4  Extremities: WWP; no edema, clubbing or cyanosis  Vascular: 2+ radial, DP/PT pulses B/L  Neurological: AAOx3; no focal deficits    MEDICATIONS:  MEDICATIONS  (STANDING):  albuterol/ipratropium for Nebulization 3 milliLiter(s) Nebulizer four times a day  apixaban 5 milliGRAM(s) Oral two times a day  clopidogrel Tablet 75 milliGRAM(s) Oral daily  fluticasone propionate/ salmeterol 250-50 MICROgram(s) Diskus 1 Dose(s) Inhalation two times a day  furosemide    Tablet 20 milliGRAM(s) Oral two times a day  guaiFENesin ER 1200 milliGRAM(s) Oral every 12 hours  magnesium oxide 400 milliGRAM(s) Oral daily  metoprolol succinate ER 25 milliGRAM(s) Oral at bedtime  pantoprazole    Tablet 40 milliGRAM(s) Oral at bedtime  potassium chloride    Tablet ER 10 milliEquivalent(s) Oral daily  predniSONE   Tablet 40 milliGRAM(s) Oral daily  spironolactone 25 milliGRAM(s) Oral daily  valsartan 40 milliGRAM(s) Oral daily    MEDICATIONS  (PRN):  albuterol    90 MICROgram(s) HFA Inhaler 2 Puff(s) Inhalation every 6 hours PRN for shortness of breath and/or wheezing  guaiFENesin Oral Liquid (Sugar-Free) 200 milliGRAM(s) Oral every 6 hours PRN Cough      ALLERGIES:  Allergies    latex (Rash)  morphine (Vomiting)  neoprene - rash (Other)    Intolerances        LABS:                        13.8   13.70 )-----------( 175      ( 14 May 2025 08:08 )             41.7     05-15    137  |  105  |  39[H]  ----------------------------<  118[H]  4.4   |  27  |  1.06    Ca    10.4[H]      15 May 2025 07:09  Phos  3.5     05-14  Mg     2.2     05-14        Urinalysis Basic - ( 15 May 2025 07:09 )    Color: x / Appearance: x / SG: x / pH: x  Gluc: 118 mg/dL / Ketone: x  / Bili: x / Urobili: x   Blood: x / Protein: x / Nitrite: x   Leuk Esterase: x / RBC: x / WBC x   Sq Epi: x / Non Sq Epi: x / Bacteria: x      CAPILLARY BLOOD GLUCOSE            RADIOLOGY & ADDITIONAL TESTS: Reviewed.

## 2025-05-15 NOTE — PROGRESS NOTE ADULT - ASSESSMENT
86 y/o M w/ PMH of HTN, dyslipidemia, aortic aneurysm, a-fib s/p ablation on 5/8 at [facility] w/ Dr. Odonnell) on Eliquis, CAD s/ PCI, chronic systolic CHF, AICD, p/w SOB    *Acute on chronic systolic CHF   -CXR: Mild to moderate CHF versus bilateral infiltrates  -IV lasix bid  ECHO: severely decreased LV function  -I/Os + Daily weights   -On BB / ARB with holding parameters  -Cardio consult  -Tele monitoring  pulmo consult  duonebs  CT chest : no PNA  5/15 switch to po lasix 20 mg bid ( at home pt was on torsemide 10 mg daily) for hypotension and increasing BUN/Cr; 55/1.31  5/16 switch from coreg to Toprol to further optimize BP/prevent hypotension     *Elevated troponins s/p cardiac ablation for a-fib   *H/o CAD   -As per Dr. Henriquez, she discussed case w/ cardio, who stated that patient did not need heparin drip, as he is on Eliquis. Discussed w/ ICU KELLEY Cleary regarding admitting patient to CCU for significantly elevated troponins. Madiha stated that she discussed case w/ on call cardiologist Dr Hodges, and states that he is okay to go to Tele floor.   -EKG: LBBB (Old)   -TTE    * Hypotension: decreased lasix to 20 mg po bid  other BP meds with holding parameters  switch from Coreg to Toprol as above   orthostatic vitals in am IMPROVING     * Pulmonary Fibrosis: out pt work up  r/o auto immune disease    * Increasing BUN/Cr:  changed to po lasix 20 mg  bid  daily BMP      * NSVT: 27 beats 0n 5/12  has AICD  EP cx appreciated  cont current mx    *H/o HTN / dyslipidemia / aortic aneurysm   -C/w home meds and f/u outpatient for further management     *DVT ppx  -On Eliquis     DISPO: Discharge tomorrow if BP stable. Further medication adjustments made today

## 2025-05-16 ENCOUNTER — TRANSCRIPTION ENCOUNTER (OUTPATIENT)
Age: 85
End: 2025-05-16

## 2025-05-16 VITALS — OXYGEN SATURATION: 96 %

## 2025-05-16 LAB
ANA TITR SER: NEGATIVE — SIGNIFICANT CHANGE UP
ANION GAP SERPL CALC-SCNC: 6 MMOL/L — SIGNIFICANT CHANGE UP (ref 5–17)
BUN SERPL-MCNC: 38 MG/DL — HIGH (ref 7–23)
CALCIUM SERPL-MCNC: 10.5 MG/DL — HIGH (ref 8.5–10.1)
CHLORIDE SERPL-SCNC: 104 MMOL/L — SIGNIFICANT CHANGE UP (ref 96–108)
CO2 SERPL-SCNC: 26 MMOL/L — SIGNIFICANT CHANGE UP (ref 22–31)
CREAT SERPL-MCNC: 1.14 MG/DL — SIGNIFICANT CHANGE UP (ref 0.5–1.3)
EGFR: 63 ML/MIN/1.73M2 — SIGNIFICANT CHANGE UP
EGFR: 63 ML/MIN/1.73M2 — SIGNIFICANT CHANGE UP
GLUCOSE SERPL-MCNC: 110 MG/DL — HIGH (ref 70–99)
HCT VFR BLD CALC: 43 % — SIGNIFICANT CHANGE UP (ref 39–50)
HGB BLD-MCNC: 14.5 G/DL — SIGNIFICANT CHANGE UP (ref 13–17)
MAGNESIUM SERPL-MCNC: 2.1 MG/DL — SIGNIFICANT CHANGE UP (ref 1.6–2.6)
MCHC RBC-ENTMCNC: 32.7 PG — SIGNIFICANT CHANGE UP (ref 27–34)
MCHC RBC-ENTMCNC: 33.7 G/DL — SIGNIFICANT CHANGE UP (ref 32–36)
MCV RBC AUTO: 97.1 FL — SIGNIFICANT CHANGE UP (ref 80–100)
NRBC # BLD AUTO: 0 K/UL — SIGNIFICANT CHANGE UP (ref 0–0)
NRBC # FLD: 0 K/UL — SIGNIFICANT CHANGE UP (ref 0–0)
NRBC BLD AUTO-RTO: 0 /100 WBCS — SIGNIFICANT CHANGE UP (ref 0–0)
PLATELET # BLD AUTO: 180 K/UL — SIGNIFICANT CHANGE UP (ref 150–400)
PMV BLD: 9.9 FL — SIGNIFICANT CHANGE UP (ref 7–13)
POTASSIUM SERPL-MCNC: 4.3 MMOL/L — SIGNIFICANT CHANGE UP (ref 3.5–5.3)
POTASSIUM SERPL-SCNC: 4.3 MMOL/L — SIGNIFICANT CHANGE UP (ref 3.5–5.3)
RBC # BLD: 4.43 M/UL — SIGNIFICANT CHANGE UP (ref 4.2–5.8)
RBC # FLD: 13.1 % — SIGNIFICANT CHANGE UP (ref 10.3–14.5)
SODIUM SERPL-SCNC: 136 MMOL/L — SIGNIFICANT CHANGE UP (ref 135–145)
WBC # BLD: 12.98 K/UL — HIGH (ref 3.8–10.5)
WBC # FLD AUTO: 12.98 K/UL — HIGH (ref 3.8–10.5)

## 2025-05-16 PROCEDURE — 99239 HOSP IP/OBS DSCHRG MGMT >30: CPT

## 2025-05-16 RX ORDER — APIXABAN 2.5 MG/1
1 TABLET, FILM COATED ORAL
Refills: 0 | DISCHARGE

## 2025-05-16 RX ORDER — FUROSEMIDE 10 MG/ML
1 INJECTION INTRAMUSCULAR; INTRAVENOUS
Qty: 60 | Refills: 0
Start: 2025-05-16 | End: 2025-06-14

## 2025-05-16 RX ORDER — MAGNESIUM OXIDE 400 MG
1 TABLET ORAL
Qty: 30 | Refills: 0
Start: 2025-05-16 | End: 2025-06-14

## 2025-05-16 RX ORDER — DEXTROMETHORPHAN HBR, GUAIFENESIN 200 MG/10ML
1 LIQUID ORAL
Qty: 28 | Refills: 0
Start: 2025-05-16 | End: 2025-05-29

## 2025-05-16 RX ORDER — APIXABAN 2.5 MG/1
1 TABLET, FILM COATED ORAL
Qty: 0 | Refills: 0 | DISCHARGE
Start: 2025-05-16

## 2025-05-16 RX ORDER — METOPROLOL SUCCINATE 50 MG/1
1 TABLET, EXTENDED RELEASE ORAL
Qty: 30 | Refills: 0
Start: 2025-05-16 | End: 2025-06-14

## 2025-05-16 RX ORDER — TORSEMIDE 20 MG/1
1 TABLET ORAL
Refills: 0 | DISCHARGE

## 2025-05-16 RX ORDER — DEXTROMETHORPHAN HBR, GUAIFENESIN 200 MG/10ML
1 LIQUID ORAL
Qty: 28 | Refills: 0 | DISCHARGE
Start: 2025-05-16 | End: 2025-05-29

## 2025-05-16 RX ADMIN — CLOPIDOGREL BISULFATE 75 MILLIGRAM(S): 75 TABLET, FILM COATED ORAL at 09:37

## 2025-05-16 RX ADMIN — IPRATROPIUM BROMIDE AND ALBUTEROL SULFATE 3 MILLILITER(S): .5; 2.5 SOLUTION RESPIRATORY (INHALATION) at 12:39

## 2025-05-16 RX ADMIN — Medication 400 MILLIGRAM(S): at 09:37

## 2025-05-16 RX ADMIN — DEXTROMETHORPHAN HBR, GUAIFENESIN 1200 MILLIGRAM(S): 200 LIQUID ORAL at 09:37

## 2025-05-16 RX ADMIN — FUROSEMIDE 20 MILLIGRAM(S): 10 INJECTION INTRAMUSCULAR; INTRAVENOUS at 05:42

## 2025-05-16 RX ADMIN — Medication 40 MILLIGRAM(S): at 09:37

## 2025-05-16 RX ADMIN — APIXABAN 5 MILLIGRAM(S): 2.5 TABLET, FILM COATED ORAL at 09:36

## 2025-05-16 RX ADMIN — Medication 10 MILLIEQUIVALENT(S): at 09:37

## 2025-05-16 RX ADMIN — DEXTROMETHORPHAN HBR, GUAIFENESIN 200 MILLIGRAM(S): 200 LIQUID ORAL at 12:33

## 2025-05-16 RX ADMIN — IPRATROPIUM BROMIDE AND ALBUTEROL SULFATE 3 MILLILITER(S): .5; 2.5 SOLUTION RESPIRATORY (INHALATION) at 09:13

## 2025-05-16 RX ADMIN — PREDNISONE 40 MILLIGRAM(S): 20 TABLET ORAL at 09:40

## 2025-05-16 RX ADMIN — Medication 25 MILLIGRAM(S): at 09:37

## 2025-05-16 RX ADMIN — FUROSEMIDE 20 MILLIGRAM(S): 10 INJECTION INTRAMUSCULAR; INTRAVENOUS at 14:46

## 2025-05-16 RX ADMIN — Medication 1 DOSE(S): at 09:27

## 2025-05-16 RX ADMIN — DEXTROMETHORPHAN HBR, GUAIFENESIN 200 MILLIGRAM(S): 200 LIQUID ORAL at 05:42

## 2025-05-16 NOTE — DISCHARGE NOTE NURSING/CASE MANAGEMENT/SOCIAL WORK - FINANCIAL ASSISTANCE
[facility] provides services at a reduced cost to those who are determined to be eligible through [facility]’s financial assistance program. Information regarding [facility]’s financial assistance program can be found by going to [email or web address] or by calling [phone].

## 2025-05-16 NOTE — DISCHARGE NOTE PROVIDER - CARE PROVIDER_API CALL
Lei Cassidy  Cardiovascular Disease  [address] [place] [address]  Phone: [phone]  Fax: [phone]  Follow Up Time: 1 week    Florence Muller  Pulmonary Disease  [address], [address]  [place] [address]  Phone: [phone]  Fax: [phone]  Follow Up Time: 1 week

## 2025-05-16 NOTE — DISCHARGE NOTE PROVIDER - HOSPITAL COURSE
HPI from Admission: 86 y/o M w/ PMH of HTN, dyslipidemia, aortic aneyrysm, a-fib s/p ablation yesterday at [facility] w/ Dr. Odonnell) on Eliquis, CAD s/ PCI, chronic systolic CHF, AICD, p/w SOB. Patient states that he had a cardiac ablation 1 day prior to arrival at [facility] and was discharged from [facility] around 1pm yesterday. States that he came home and around 7pm he developed SOB. Patient came to ED, and was treated acute CHF and placed on bipap. Currently patient is off of bipap, and patient states he feels much better. Denies CP, palpitations, nausea, vomiting, cough, runny nose, sore throat.     Brief Course: Admitted for treatment of CHF exacerbation. IV Lasix now transitioned to PO. Course complicated by hypotension. Improved with adjustments to BP medications. Patient feeling better. Pulmonary also following while inpatient for concern of ILD -- started patient on Prednisone which helped with cough/symptoms. Discussed plan with Dr. Muller -- will discharge on steroid taper and she will arrange her office to make a follow up appointment in 1 week. Stable for discharge home with close follow up with Dr. Cassidy. Cardiology recommendations appreciated.     Hospital course (by problem):   *Acute on chronic systolic CHF   -CXR: Mild to moderate CHF versus bilateral infiltrates  ECHO: severely decreased LV function  -I/Os + Daily weights   -On BB / Entresto  -Cardio consult appreciated  CT chest : no PNA  5/14: switch to po lasix 20 mg bid ( at home pt was on torsemide 10 mg daily) for hypotension and increasing BUN/Cr; 55/1.31  5/15: switch from coreg to Toprol to further optimize BP/prevent hypotension   5/16: BP stable. Will go home with close outpatient follow up    *Pulmonary Fibrosis  Pulmonary following while inpatient for concern of ILD -- started patient on Prednisone which helped with cough/symptoms.  Continue further outpatient workup   Discussed plan with Dr. Muller -- discharge on Prednisone 40 x3 daysk, 30 x3 days, 20 x3 days. She will arrange a follow up in 1 week to determine further steroid cousrse     *Elevated troponins s/p cardiac ablation for a-fib   *H/o CAD   -EKG: LBBB (Old)   -TTE severely decreased LV function  -Per Cardiology, troponin elevation due to AF ablation and demand     * Hypotension: decreased lasix to 20 mg po bid  switch from Coreg to Toprol as above. BP improved   orthostatic vitals in am IMPROVING     * Increasing BUN/Cr:  changed to po lasix 20 mg  bid  Improving     * NSVT: 27 beats 0n 5/12,   27 beats of VT without symptoms on 5/12. 16 beats of AIVR this AM without symptoms. AICD in place.  EP cx appreciated  cont current mx    *H/o HTN / dyslipidemia / aortic aneurysm   -C/w home meds and f/u outpatient for further management     *DVT ppx  -On Eliquis     Patient was discharged to: Home with home care    Physical exam at the time of discharge:  LOS: 7d    VITALS:   T(C): 36.6 (05-16-25 @ 12:14), Max: 36.7 (05-15-25 @ 20:25)  HR: 77 (05-16-25 @ 12:14) (75 - 91)  BP: 103/66 (05-16-25 @ 12:14) (99/63 - 115/79)  RR: 18 (05-16-25 @ 12:14) (18 - 18)  SpO2: 96% (05-16-25 @ 12:14) (92% - 97%)    PHYSICAL EXAM:  GENERAL: NAD  HEAD:  Atraumatic, Normocephalic  EYES: EOMI, PERRLA, conjunctiva and sclera clear  ENMT: No tonsillar erythema, exudates, or enlargement; Moist mucous membranes  NECK: Supple, No JVD, Normal thyroid  HEART: Regular rate and rhythm; No murmurs, rubs, or gallops  RESPIRATORY: Unlabored respirations. CTA B/L, No W/R/R  ABDOMEN: Soft, Nontender, Nondistended; Bowel sounds present  NEUROLOGY: A&Ox3, nonfocal, moving all extremities  EXTREMITIES:  2+ Peripheral Pulses, No clubbing, cyanosis, or edema  SKIN: warm, dry, normal color, no rash or abnormal lesions         HPI from Admission: 86 y/o M w/ PMH of HTN, dyslipidemia, aortic aneyrysm, a-fib s/p ablation yesterday at [facility] w/ Dr. Odonnell) on Eliquis, CAD s/ PCI, chronic systolic CHF, AICD, p/w SOB. Patient states that he had a cardiac ablation 1 day prior to arrival at [facility] and was discharged from [facility] around 1pm yesterday. States that he came home and around 7pm he developed SOB. Patient came to ED, and was treated acute CHF and placed on bipap. Currently patient is off of bipap, and patient states he feels much better. Denies CP, palpitations, nausea, vomiting, cough, runny nose, sore throat.     Brief Course: Admitted for treatment of CHF exacerbation. IV Lasix now transitioned to PO. Course complicated by hypotension. Improved with adjustments to BP medications. Patient feeling better. Pulmonary also following while inpatient for concern of ILD -- started patient on Prednisone which helped with cough/symptoms. Discussed plan with Dr. Muller -- will discharge on steroid taper and she will arrange her office to make a follow up appointment in 1 week. Stable for discharge home with close follow up with Dr. Cassidy. Cardiology recommendations appreciated.     Hospital course (by problem):   *Acute on chronic systolic CHF   -CXR: Mild to moderate CHF versus bilateral infiltrates  ECHO: severely decreased LV function  -I/Os + Daily weights   -On BB / Entresto  -Cardio consult appreciated  CT chest : no PNA  5/14: switch to po lasix 20 mg bid ( at home pt was on torsemide 10 mg daily) for hypotension and increasing BUN/Cr; 55/1.31  5/15: switch from coreg to Toprol to further optimize BP/prevent hypotension   5/16: BP stable. Will go home with close outpatient follow up    *Pulmonary Fibrosis  Pulmonary following while inpatient for concern of ILD -- started patient on Prednisone which helped with cough/symptoms.  Continue further outpatient workup   Discussed plan with Dr. Muller -- discharge on Prednisone 40 x3 daysk, 30 x3 days, 20 x3 days. She will arrange a follow up in 1 week to determine further steroid cousrse     *Elevated troponins s/p cardiac ablation for a-fib   *H/o CAD   -EKG: LBBB (Old)   -TTE severely decreased LV function  -Per Cardiology, troponin elevation due to AF ablation and demand     *Mild leukocytosis  Likely from steroids. No clinical signs of infection. Monitor outpatient     *Hypotension: decreased lasix to 20 mg po bid  switch from Coreg to Toprol as above. BP improved   orthostatic vitals in am IMPROVING     * Increasing BUN/Cr:  changed to po lasix 20 mg  bid  Improving     * NSVT: 27 beats 0n 5/12,   27 beats of VT without symptoms on 5/12. 16 beats of AIVR this AM without symptoms. AICD in place.  EP cx appreciated  cont current mx    *H/o HTN / dyslipidemia / aortic aneurysm   -C/w home meds and f/u outpatient for further management     *DVT ppx  -On Eliquis     Patient was discharged to: Home with home care    Physical exam at the time of discharge:  LOS: 7d    VITALS:   T(C): 36.6 (05-16-25 @ 12:14), Max: 36.7 (05-15-25 @ 20:25)  HR: 77 (05-16-25 @ 12:14) (75 - 91)  BP: 103/66 (05-16-25 @ 12:14) (99/63 - 115/79)  RR: 18 (05-16-25 @ 12:14) (18 - 18)  SpO2: 96% (05-16-25 @ 12:14) (92% - 97%)    PHYSICAL EXAM:  GENERAL: NAD  HEAD:  Atraumatic, Normocephalic  EYES: EOMI, PERRLA, conjunctiva and sclera clear  ENMT: No tonsillar erythema, exudates, or enlargement; Moist mucous membranes  NECK: Supple, No JVD, Normal thyroid  HEART: Regular rate and rhythm; No murmurs, rubs, or gallops  RESPIRATORY: Unlabored respirations. CTA B/L, No W/R/R  ABDOMEN: Soft, Nontender, Nondistended; Bowel sounds present  NEUROLOGY: A&Ox3, nonfocal, moving all extremities  EXTREMITIES:  2+ Peripheral Pulses, No clubbing, cyanosis, or edema  SKIN: warm, dry, normal color, no rash or abnormal lesions

## 2025-05-16 NOTE — DISCHARGE NOTE PROVIDER - NSDCMRMEDTOKEN_GEN_ALL_CORE_FT
Albuterol (Eqv-Proventil HFA) 90 mcg/inh inhalation aerosol: 2 puff(s) inhaled every 6 hours as needed for  shortness of breath and/or wheezing  apixaban 5 mg oral tablet: 1 tab(s) orally 2 times a day  clopidogrel 75 mg oral tablet: 1 tab(s) orally once a day  furosemide 20 mg oral tablet: 1 tab(s) orally 2 times a day  guaiFENesin 1200 mg oral tablet, extended release: 1 tab(s) orally every 12 hours  magnesium oxide 400 mg oral tablet: 1 tab(s) orally once a day  metoprolol succinate 25 mg oral tablet, extended release: 1 tab(s) orally once a day (at bedtime)  pantoprazole 40 mg oral delayed release tablet: 1 tab(s) orally once a day (at bedtime)  Potassium Chloride (Eqv-Klor-Con 10) 10 mEq oral tablet, extended release: 1 tab(s) orally once a day  predniSONE 10 mg oral tablet: 1 tab(s) orally once a day Prednisone 40 mg x 3 days 5/17-5/19  Prednisone 30 mg x 3 days 5/20-5/22  Prednisone 20 mg x 3 days  5/23-5/25  spironolactone 25 mg oral tablet: 1 tab(s) orally once a day  valsartan 40 mg oral tablet: 1 tab(s) orally once a day

## 2025-05-16 NOTE — DISCHARGE NOTE PROVIDER - NSDCCAREPROVSEEN_GEN_ALL_CORE_FT
Yohana, Florence Roman, Erasto Herrera, Tamera Smith, Luz Maria Hodges, Eber Romero, Jia Mendoza, Sherif Nguyen, Mesha Tamez, Yary Diaz, Luis

## 2025-05-16 NOTE — DISCHARGE NOTE PROVIDER - CARE PROVIDERS DIRECT ADDRESSES
[email or web address]

## 2025-05-16 NOTE — DISCHARGE NOTE NURSING/CASE MANAGEMENT/SOCIAL WORK - PATIENT PORTAL LINK FT
You can access the FollowMyHealth Patient Portal offered by [facility] by registering at the following website: [email or web address]. By joining [facility]’s FollowMyHealth portal, you will also be able to view your health information using other applications (apps) compatible with our system.

## 2025-05-16 NOTE — DISCHARGE NOTE PROVIDER - PROVIDER TOKENS
PROVIDER:[TOKEN:[[id number]],FOLLOWUP:[1 week]],PROVIDER:[TOKEN:[[id number]],FOLLOWUP:[1 week]]

## 2025-05-16 NOTE — DISCHARGE NOTE PROVIDER - NSDCCPCAREPLAN_GEN_ALL_CORE_FT
PRINCIPAL DISCHARGE DIAGNOSIS  Diagnosis: Acute on chronic systolic congestive heart failure  Assessment and Plan of Treatment: WHAT IS HEART FAILURE? Heart failure (HF) is a condition in which the heart cannot pump enough blood to meet the body’s needs. The weakening of the heart’s pumping ability results in the buildup of fluid in the feet, ankles and legs resulting in edema, tiredness, and shortness of breath.  THINGS TO DO: (1) Weigh yourself daily – keep a log for you cardiologist (2) Maintain a heart healthy diet and limit dietary sodium (3) Drink liquids as directed – you may need to limit the amount of liquid you drink to prevent fluid buildup (4) Maintain a healthy weight (5) Stay active with exercise  MONITOR THESE SIGNS AND SYMPTOMS: (1) Worsening shortness of breath at rest or at night (2) worsening leg swelling (3) Abdominal pain or swelling (4) Chest pain or palpitations (5) Weight gain of 5lbs or more in 1 week or 2-3lbs in 24hours. If you experience any of these, DO alert your Cardiologist.  Medication changes:  --We STOPPED your Torsemide and switched you to Lasix 20 mg twice daily  --We STOPPED your Coreg and switched you to Toprol 25 mg once daily to improve your blood pressure. During your follow up with Dr. Cassidy, your Toprol dose may be increased depending on how much your blood pressure can tolerate it.   Resume other home medications as prescribed. Follow up with Dr. Cassidy on your scheduled appointment in 1 week.      SECONDARY DISCHARGE DIAGNOSES  Diagnosis: Lung disease, interstitial  Assessment and Plan of Treatment: You were seen by our Lung Doctors while in the hospital. We started you on Prednisone (steroid) to help with your symptoms.  Continue the following steroid taper:  Prednisone 40 mg x 3 days 5/17-5/19  Prednisone 30 mg x 3 days 5/20-5/22  Prednisone 20 mg x 3 days  5/23-5/25  Dr. Muller will have her office call you to arrange a follow up appointment within 1 week. She will determine your steroid course during your follow up visit. Continue inhalers as prescribed.

## 2025-05-16 NOTE — PROGRESS NOTE ADULT - SUBJECTIVE AND OBJECTIVE BOX
Patient is a 85y old  Male who presents with a chief complaint of SOB (15 May 2025 15:52)    5/10/2025-  I saw Benito today for cardiac assessments as he's in [facility] now with heart failure. He's a pleasant 85-year-old with hypertension, dyslipidemia, known aortic aneurysm, atrial fibrillation ablation a few days ago at [facility], history of ischemic heart disease and cardiomyopathy with prior PCI, systolic heart failure, and ICD, on Eliquis, admitted with shortness of breath and found to have heart failure. He was placed on BiPAP, given IV Lasix, and is diuresing well without issues.    He's seen resting comfortably in bed, no immediate distress, no current chest pain, shortness of breath, palpitations, syncope, or edema. He had significant troponin elevation, likely on the basis of demand ischemia and recent AFib ablation and not from a coronary ischemic event. Although ECG is uninterpretable, showing left bundle branch block, he does have evidence of brief bursts of non-sustained VT on telemetry, which is asymptomatic.      PSH: PCI, tonsillectomy, B/L hip replacement, B/L TKR, cataract surgery     Social Hx: Tobacco - quit 45 years ago, etoh - occasionally once per week, drugs - denies     Family Hx:  Denies pertinent hx     ALLERGIES:  Allergies    latex (Rash)  morphine (Vomiting)  neoprene - rash (Other)    Followup HPI:    5/11-Benito is seen today resting comfortably in the chair in no immediate distress but had some shortness of breath last evening requiring some albuterol. He feels a bit lethargic today but is diuresing well and denies current chest pains, palpitations, syncope, or edema.  5/12- Coughing up green sputum. Feels breathless and is wheezing intermittently. States he feels no better than on admission. Denies  chest pain.  5/13-Less wheeze, cough and shortness of breath. 27 beats of asymptomatic VT on 5/12 at 18:29 hours.  5/15- Still coughing sporadically. Not requiring supplemental oxygen. No chest pain. EP note and ICD interrogation noted. No change in medical or device  therapy advised.  5/16- Feels much better. Denies shortness of breath, wheeze, chest pain, palpitations.  At 3:48 hours had 16 beats of WCT at 110 BPM.     PAST MEDICAL & SURGICAL HISTORY:  GERD (gastroesophageal reflux disease)      CAD (coronary artery disease)      Stented coronary artery  PTCA x 1 stent -2013      HTN (hypertension)      Obesity      Hypercholesterolemia      Osteoarthritis  hip      AF (atrial fibrillation)      Aortic aneurysm, thoracic      NICM (nonischemic cardiomyopathy)      History of PTCA 1  2013 x 1 stent      S/P tonsillectomy  13 y/o      H/O bilateral hip replacements  right 1999,  2003 left  revision right THR - 2010 and 2013      H/o total knee replacement, bilateral  right 1997, left 1999      S/P cataract surgery  b/l      H/O endoscopy  2017      H/O colonoscopy  2014          Review of symptoms:  Negative except for as noted in today's HPI.      MEDICATIONS  (STANDING):  albuterol/ipratropium for Nebulization 3 milliLiter(s) Nebulizer four times a day  apixaban 5 milliGRAM(s) Oral two times a day  clopidogrel Tablet 75 milliGRAM(s) Oral daily  fluticasone propionate/ salmeterol 250-50 MICROgram(s) Diskus 1 Dose(s) Inhalation two times a day  furosemide    Tablet 20 milliGRAM(s) Oral two times a day  guaiFENesin ER 1200 milliGRAM(s) Oral every 12 hours  magnesium oxide 400 milliGRAM(s) Oral daily  metoprolol succinate ER 25 milliGRAM(s) Oral at bedtime  pantoprazole    Tablet 40 milliGRAM(s) Oral at bedtime  potassium chloride    Tablet ER 10 milliEquivalent(s) Oral daily  predniSONE   Tablet 40 milliGRAM(s) Oral daily  spironolactone 25 milliGRAM(s) Oral daily  valsartan 40 milliGRAM(s) Oral daily    MEDICATIONS  (PRN):  albuterol    90 MICROgram(s) HFA Inhaler 2 Puff(s) Inhalation every 6 hours PRN for shortness of breath and/or wheezing  guaiFENesin Oral Liquid (Sugar-Free) 200 milliGRAM(s) Oral every 6 hours PRN Cough          Vital Signs Last 24 Hrs  T(C): 36.3 (16 May 2025 05:51), Max: 36.7 (15 May 2025 09:03)  T(F): 97.4 (16 May 2025 05:51), Max: 98.1 (15 May 2025 09:03)  HR: 81 (16 May 2025 05:51) (75 - 91)  BP: 107/71 (16 May 2025 05:51) (99/63 - 115/79)  BP(mean): --  RR: 18 (16 May 2025 05:51) (18 - 18)  SpO2: 94% (16 May 2025 05:51) (90% - 97%)    Parameters below as of 16 May 2025 05:51  Patient On (Oxygen Delivery Method): room air        I&O's Summary    15 May 2025 07:01  -  16 May 2025 07:00  --------------------------------------------------------  IN: 1175 mL / OUT: 1350 mL / NET: -175 mL        PHYSICAL EXAM  General Appearance: comfortable    HEENT:   Neck:   Lungs: scant bibasilar crackles  Heart: no murmur  Abdomen:   Extremities: no edema  Neurologic:       INTERPRETATION OF TELEMETRY: RSR, first degree AV block 16 beat of AIVR(slow VT) this AM without symptoms.    ECG:    LABS:                          14.5   12.98 )-----------( 180      ( 16 May 2025 05:37 )             43.0     05-16    136  |  104  |  38[H]  ----------------------------<  110[H]  4.3   |  26  |  1.14    Ca    10.5[H]      16 May 2025 05:37  Phos  3.5     05-14  Mg     2.1     05-16                Urinalysis Basic - ( 16 May 2025 05:37 )    Color: x / Appearance: x / SG: x / pH: x  Gluc: 110 mg/dL / Ketone: x  / Bili: x / Urobili: x   Blood: x / Protein: x / Nitrite: x   Leuk Esterase: x / RBC: x / WBC x   Sq Epi: x / Non Sq Epi: x / Bacteria: x            RADIOLOGY & ADDITIONAL STUDIES:    IMPRESSION:      1. Severe nonischemic cardiomyopathy with CHF. Symptomatically improved and now euvolemic.  2.Interstitial lung disease and COPD with improvement on prednisone.  Continue  prednisone, albuterol ipratropium, salmeterol fluticasone.  3.S/P AF ablation 5/8/2025. Stable. In RSR.   4.ICD in situ since 5/9/2024. 27 beats of VT without symptoms on 5/12. 16 beats of AIVR this AM without symptoms. AICD in place. Continue with current medical management with HF medications. Not on Entresto due to cost.   5. CAD VINOD RCA 2013. Troponin elevation due to AF ablation and oxygen demand supply mismatch. No angina or ACS.  6.Thoracic ascending aortic aneurysm 4.9 cm by CT 1/29/2025. Stable.   PLAN:  Continue present cardiac medication. Will continue Plavix for 1 month then discontinue it and continue Eliquis alone to reduce the long term risk of bleeding. Consider transition  to out patient care .I asked patient to see me in the out patient clinic in 1 week. Out patient EP follow up with DR. Raphael Anton and pulmonary follow up with Dr.Vlassi Berger.

## 2025-05-16 NOTE — PROGRESS NOTE ADULT - PROVIDER SPECIALTY LIST ADULT
Cardiology
Pulmonology
Hospitalist
Pulmonology
Cardiology
Hospitalist

## 2025-05-16 NOTE — DISCHARGE NOTE NURSING/CASE MANAGEMENT/SOCIAL WORK - NSDCPNINST_GEN_ALL_CORE
Thank you for choosing [facility]. It has been our pleasure taking care of you. Please let us know if you have any questions, concerns or needs. For a complete copy of medical records please visit : [email or web address]

## 2025-05-16 NOTE — DISCHARGE NOTE PROVIDER - NSDCCPTREATMENT_GEN_ALL_CORE_FT
PRINCIPAL PROCEDURE  Procedure: CT chest wo contrast  Findings and Treatment: FINDINGS:  LUNGS AND LARGE AIRWAYS: Patent central airways. There is a   redemonstration of architectural distortion of lung parenchyma,   subpleural reticulations /fibrotic changes, and peribronchial wall   thickening, most prominent in lung bases, relatively unchanged and   suggestive of interstitial lung disease. There is no significant   bronchiectasis, honeycombing, or cystic lung disease. There is no focal   consolidation.  PLEURA: No pleural effusion.  VESSELS: Mid ascending thoracic aorta is dilated measuring up to 4.3 cm   in diameter. There are moderate calcified atheromas in thoracic aorta.   Severe calcification of coronary arteries.  HEART: Cardiomegaly. No pericardial effusion.  MEDIASTINUM AND LUIS ALBERTO: There are few prominent mediastinal lymph nodes   measuring up to 1.1 cmin right paratracheal region..  CHEST WALL AND LOWER NECK: A single lead AICD present within the left   chest wall lead terminating in the right ventricle..  VISUALIZED UPPER ABDOMEN: Partially visualized colonic diverticuli are   noted.  BONES: Degenerative changes.  IMPRESSION:  Redemonstration of architectural distortion of lung parenchyma,   subpleural reticulations and fibrotic changes, groundglass opacities, and   peribronchial wall thickening, likely suggestive of fibrotic lung disease.  There is no evidence of focal consolidation  Severe calcification of coronary arteries  Ascending thoracic aorta ectasia

## 2025-05-16 NOTE — DISCHARGE NOTE PROVIDER - NSDCDCMDCOMP_GEN_ALL_CORE
H/O varicose veins of lower extremity  right .2015
This document is complete and the patient is ready for discharge.

## 2025-05-16 NOTE — PROVIDER CONTACT NOTE (OTHER) - ASSESSMENT
The cataracts are mild and have minimal impact on vision at this time.
Pt is asymptomatic. VSS.
No SOB or discomfort at this time.

## 2025-05-17 LAB
SSA-52 (RO52) (ENA) ANTIBODY, IGG: 5 AU/ML — SIGNIFICANT CHANGE UP (ref 0–40)
SSA-60 (RO60) (ENA) ANTIBODY, IGG: 0 AU/ML — SIGNIFICANT CHANGE UP (ref 0–40)

## 2025-05-17 RX ORDER — PREDNISONE 20 MG/1
1 TABLET ORAL
Qty: 27 | Refills: 0
Start: 2025-05-17

## 2025-05-20 DIAGNOSIS — I42.8 OTHER CARDIOMYOPATHIES: ICD-10-CM

## 2025-05-20 DIAGNOSIS — Z95.5 PRESENCE OF CORONARY ANGIOPLASTY IMPLANT AND GRAFT: ICD-10-CM

## 2025-05-20 DIAGNOSIS — Z79.02 LONG TERM (CURRENT) USE OF ANTITHROMBOTICS/ANTIPLATELETS: ICD-10-CM

## 2025-05-20 DIAGNOSIS — Z91.040 LATEX ALLERGY STATUS: ICD-10-CM

## 2025-05-20 DIAGNOSIS — I48.0 PAROXYSMAL ATRIAL FIBRILLATION: ICD-10-CM

## 2025-05-20 DIAGNOSIS — I95.9 HYPOTENSION, UNSPECIFIED: ICD-10-CM

## 2025-05-20 DIAGNOSIS — J84.10 PULMONARY FIBROSIS, UNSPECIFIED: ICD-10-CM

## 2025-05-20 DIAGNOSIS — Z66 DO NOT RESUSCITATE: ICD-10-CM

## 2025-05-20 DIAGNOSIS — Z79.01 LONG TERM (CURRENT) USE OF ANTICOAGULANTS: ICD-10-CM

## 2025-05-20 DIAGNOSIS — E66.9 OBESITY, UNSPECIFIED: ICD-10-CM

## 2025-05-20 DIAGNOSIS — I25.10 ATHEROSCLEROTIC HEART DISEASE OF NATIVE CORONARY ARTERY WITHOUT ANGINA PECTORIS: ICD-10-CM

## 2025-05-20 DIAGNOSIS — E78.00 PURE HYPERCHOLESTEROLEMIA, UNSPECIFIED: ICD-10-CM

## 2025-05-20 DIAGNOSIS — Z96.643 PRESENCE OF ARTIFICIAL HIP JOINT, BILATERAL: ICD-10-CM

## 2025-05-20 DIAGNOSIS — I47.20 VENTRICULAR TACHYCARDIA, UNSPECIFIED: ICD-10-CM

## 2025-05-20 DIAGNOSIS — Z88.8 ALLERGY STATUS TO OTHER DRUGS, MEDICAMENTS AND BIOLOGICAL SUBSTANCES: ICD-10-CM

## 2025-05-20 DIAGNOSIS — R79.89 OTHER SPECIFIED ABNORMAL FINDINGS OF BLOOD CHEMISTRY: ICD-10-CM

## 2025-05-20 DIAGNOSIS — Z88.5 ALLERGY STATUS TO NARCOTIC AGENT: ICD-10-CM

## 2025-05-20 DIAGNOSIS — Z96.653 PRESENCE OF ARTIFICIAL KNEE JOINT, BILATERAL: ICD-10-CM

## 2025-05-20 DIAGNOSIS — K21.9 GASTRO-ESOPHAGEAL REFLUX DISEASE WITHOUT ESOPHAGITIS: ICD-10-CM

## 2025-05-20 DIAGNOSIS — I44.7 LEFT BUNDLE-BRANCH BLOCK, UNSPECIFIED: ICD-10-CM

## 2025-05-20 DIAGNOSIS — I11.0 HYPERTENSIVE HEART DISEASE WITH HEART FAILURE: ICD-10-CM

## 2025-05-20 DIAGNOSIS — Z95.810 PRESENCE OF AUTOMATIC (IMPLANTABLE) CARDIAC DEFIBRILLATOR: ICD-10-CM

## 2025-05-20 DIAGNOSIS — I50.23 ACUTE ON CHRONIC SYSTOLIC (CONGESTIVE) HEART FAILURE: ICD-10-CM

## 2025-05-20 DIAGNOSIS — Z87.891 PERSONAL HISTORY OF NICOTINE DEPENDENCE: ICD-10-CM

## 2025-05-20 LAB
ANA PAT SER IF-IMP: SIGNIFICANT CHANGE UP
ANA TITR SER HEP2 SUBST: NEGATIVE — SIGNIFICANT CHANGE UP
ANTI-RA33 IGA CONCENTRATION (ELFA): 2.7 U/ML — SIGNIFICANT CHANGE UP
ANTI-RA33 IGG CONCENTRATION (ELFA): 3.7 U/ML — SIGNIFICANT CHANGE UP
ANTI-RA33 IGM CONCENTRATION (ELFA): <23 U/ML — SIGNIFICANT CHANGE UP
AVISE LUPUS INDEX: -3 — SIGNIFICANT CHANGE UP
AVISE LUPUS RESULT: NEGATIVE — SIGNIFICANT CHANGE UP
B-LYMPHOCYTE-BOUND C4D (BC4D) LEVEL (FC): 10 — SIGNIFICANT CHANGE UP
B2 GLYCOPROT1 IGG SERPL IA-ACNC: 1.4 U/ML — SIGNIFICANT CHANGE UP
B2 GLYCOPROT1 IGM SERPL IA-ACNC: <2.4 U/ML — SIGNIFICANT CHANGE UP
CARDIOLIPIN IGG SER IA-ACNC: 0.9 GPL — SIGNIFICANT CHANGE UP
CARDIOLIPIN IGM SER IA-ACNC: <0.9 MPL — SIGNIFICANT CHANGE UP
CCP IGG SERPL-ACNC: 0.8 U/ML — SIGNIFICANT CHANGE UP
CENTROMERE B IGG SER QL LINE BLOT: <0.4 U/ML — SIGNIFICANT CHANGE UP
CYTOPLASMIC AB PATTERN SER IF-IMP: SIGNIFICANT CHANGE UP
DSDNA IGG SERPL IA-ACNC: 17.68 IU/ML — SIGNIFICANT CHANGE UP
ENA JO1 AB SER QL IA: 0.3 U/ML — SIGNIFICANT CHANGE UP
ENA SCL70 IGG SER IA-ACNC: <0.6 U/ML — SIGNIFICANT CHANGE UP
ENA SM IGG SER IA-ACNC: <0.7 U/ML — SIGNIFICANT CHANGE UP
ENA SS-A 60KD AB SER-ACNC: <0.4 U/ML — SIGNIFICANT CHANGE UP
ENA SS-A IGG SER QL: <0.3 U/ML — SIGNIFICANT CHANGE UP
ENA SS-B IGG SER IA-ACNC: <0.4 U/ML — SIGNIFICANT CHANGE UP
ERYTHROCYTE-BOUND C4D (EC4D) LEVEL (FC): 3 — SIGNIFICANT CHANGE UP
NUCLEAR IGG SER QL IA: 8.73 UNITS — SIGNIFICANT CHANGE UP
RF IGA SER-ACNC: 19 IU/ML — SIGNIFICANT CHANGE UP
RF IGM SER IA-ACNC: 0.7 IU/ML — SIGNIFICANT CHANGE UP
RNAP III IGG SERPL IA-ACNC: <0.7 U/ML — SIGNIFICANT CHANGE UP
TC4D (FC): <179 — SIGNIFICANT CHANGE UP
THYROGLOB IGG SERPL IA-ACNC: <12 IU/ML — SIGNIFICANT CHANGE UP
THYROPEROXIDASE IGG SERPL IA-ACNC: <4 IU/ML — SIGNIFICANT CHANGE UP
TIGG (FC): <138 — SIGNIFICANT CHANGE UP
TIGM (FC): <187 — SIGNIFICANT CHANGE UP
U1 SNRNP IGG SER IA-ACNC: 1.7 U/ML — SIGNIFICANT CHANGE UP
U1 SNRNP70 IGG SER IA-ACNC: <0.3 U/ML — SIGNIFICANT CHANGE UP

## 2025-05-26 LAB
CENP-A: <11 SI — SIGNIFICANT CHANGE UP
CENP-B: <11 SI — SIGNIFICANT CHANGE UP
FIBRILLARIN: <11 SI — SIGNIFICANT CHANGE UP
PM/SCL-100: <11 SI — SIGNIFICANT CHANGE UP
PM/SCL-75: <11 SI — SIGNIFICANT CHANGE UP
RP11: <11 SI — SIGNIFICANT CHANGE UP
RP155: <11 SI — SIGNIFICANT CHANGE UP
SCL-70: <11 SI — SIGNIFICANT CHANGE UP
TH/TO: <11 SI — SIGNIFICANT CHANGE UP
U1-SNRNP RNP A: <11 SI — SIGNIFICANT CHANGE UP
U1-SNRNP RNP C: <11 SI — SIGNIFICANT CHANGE UP
U1-SNRNP RNP-70KD: <11 SI — SIGNIFICANT CHANGE UP

## 2025-05-31 LAB
EJ: NEGATIVE — SIGNIFICANT CHANGE UP
ENA JO1 AB SER IA-ACNC: <20 UNITS — SIGNIFICANT CHANGE UP
ENA PM/SCL AB SER-ACNC: 22 UNITS — HIGH
ENA SM+RNP AB SER IA-ACNC: <20 UNITS — SIGNIFICANT CHANGE UP
ENA SS-A IGG SER QL: <20 UNITS — SIGNIFICANT CHANGE UP
FIBRILLARIN AB SER QL: NEGATIVE — SIGNIFICANT CHANGE UP
KU AB SER QL: NEGATIVE — SIGNIFICANT CHANGE UP
MDA-5 (P140)(CADM-140): <20 UNITS — SIGNIFICANT CHANGE UP
MI2 AB SER QL: NEGATIVE — SIGNIFICANT CHANGE UP
NXP-2 (P140): <20 UNITS — SIGNIFICANT CHANGE UP
OJ AB SER QL: NEGATIVE — SIGNIFICANT CHANGE UP
PL12 AB SER QL: NEGATIVE — SIGNIFICANT CHANGE UP
PL7 AB SER QL: NEGATIVE — SIGNIFICANT CHANGE UP
SRP AB SERPL QL: NEGATIVE — SIGNIFICANT CHANGE UP
TIF GAMMA (P155/140): <20 UNITS — SIGNIFICANT CHANGE UP
U2 SNRNP AB SER QL: NEGATIVE — SIGNIFICANT CHANGE UP

## 2025-07-01 ENCOUNTER — RESULT REVIEW (OUTPATIENT)
Age: 85
End: 2025-07-01

## 2025-07-01 ENCOUNTER — TRANSCRIPTION ENCOUNTER (OUTPATIENT)
Age: 85
End: 2025-07-01

## 2025-07-01 ENCOUNTER — INPATIENT (INPATIENT)
Facility: HOSPITAL | Age: 85
LOS: 1 days | Discharge: HOME | End: 2025-07-03
Attending: STUDENT IN AN ORGANIZED HEALTH CARE EDUCATION/TRAINING PROGRAM | Admitting: STUDENT IN AN ORGANIZED HEALTH CARE EDUCATION/TRAINING PROGRAM
Payer: MEDICARE

## 2025-07-01 VITALS
RESPIRATION RATE: 20 BRPM | SYSTOLIC BLOOD PRESSURE: 116 MMHG | WEIGHT: 222.01 LBS | HEIGHT: 70 IN | HEART RATE: 74 BPM | DIASTOLIC BLOOD PRESSURE: 68 MMHG | TEMPERATURE: 98 F

## 2025-07-01 DIAGNOSIS — Z90.89 ACQUIRED ABSENCE OF OTHER ORGANS: Chronic | ICD-10-CM

## 2025-07-01 DIAGNOSIS — Z98.61 CORONARY ANGIOPLASTY STATUS: Chronic | ICD-10-CM

## 2025-07-01 DIAGNOSIS — Z96.653 PRESENCE OF ARTIFICIAL KNEE JOINT, BILATERAL: Chronic | ICD-10-CM

## 2025-07-01 DIAGNOSIS — Z95.810 PRESENCE OF AUTOMATIC (IMPLANTABLE) CARDIAC DEFIBRILLATOR: Chronic | ICD-10-CM

## 2025-07-01 DIAGNOSIS — Z98.890 OTHER SPECIFIED POSTPROCEDURAL STATES: Chronic | ICD-10-CM

## 2025-07-01 DIAGNOSIS — Z98.49 CATARACT EXTRACTION STATUS, UNSPECIFIED EYE: Chronic | ICD-10-CM

## 2025-07-01 DIAGNOSIS — I50.9 HEART FAILURE, UNSPECIFIED: ICD-10-CM

## 2025-07-01 DIAGNOSIS — Z96.643 PRESENCE OF ARTIFICIAL HIP JOINT, BILATERAL: Chronic | ICD-10-CM

## 2025-07-01 LAB
A1C WITH ESTIMATED AVERAGE GLUCOSE RESULT: 5.8 % — HIGH (ref 4–5.6)
ADD ON TEST-SPECIMEN IN LAB: SIGNIFICANT CHANGE UP
ALBUMIN SERPL ELPH-MCNC: 3.7 G/DL — SIGNIFICANT CHANGE UP (ref 3.3–5)
ALP SERPL-CCNC: 52 U/L — SIGNIFICANT CHANGE UP (ref 40–120)
ALT FLD-CCNC: 11 U/L — SIGNIFICANT CHANGE UP (ref 4–41)
ANION GAP SERPL CALC-SCNC: 13 MMOL/L — SIGNIFICANT CHANGE UP (ref 7–14)
ANION GAP SERPL CALC-SCNC: 15 MMOL/L — HIGH (ref 7–14)
APTT BLD: 28.7 SEC — SIGNIFICANT CHANGE UP (ref 26.1–36.8)
AST SERPL-CCNC: 20 U/L — SIGNIFICANT CHANGE UP (ref 4–40)
BASOPHILS # BLD AUTO: 0.07 K/UL — SIGNIFICANT CHANGE UP (ref 0–0.2)
BASOPHILS # BLD MANUAL: 0 K/UL — SIGNIFICANT CHANGE UP (ref 0–0.2)
BASOPHILS NFR BLD AUTO: 0.7 % — SIGNIFICANT CHANGE UP (ref 0–2)
BASOPHILS NFR BLD MANUAL: 0 % — SIGNIFICANT CHANGE UP (ref 0–2)
BILIRUB SERPL-MCNC: 0.5 MG/DL — SIGNIFICANT CHANGE UP (ref 0.2–1.2)
BLD GP AB SCN SERPL QL: NEGATIVE — SIGNIFICANT CHANGE UP
BUN SERPL-MCNC: 16 MG/DL — SIGNIFICANT CHANGE UP (ref 7–23)
BUN SERPL-MCNC: 17 MG/DL — SIGNIFICANT CHANGE UP (ref 7–23)
BURR CELLS BLD QL SMEAR: SLIGHT — SIGNIFICANT CHANGE UP
CALCIUM SERPL-MCNC: 9.6 MG/DL — SIGNIFICANT CHANGE UP (ref 8.4–10.5)
CALCIUM SERPL-MCNC: 9.6 MG/DL — SIGNIFICANT CHANGE UP (ref 8.4–10.5)
CHLORIDE SERPL-SCNC: 104 MMOL/L — SIGNIFICANT CHANGE UP (ref 98–107)
CHLORIDE SERPL-SCNC: 104 MMOL/L — SIGNIFICANT CHANGE UP (ref 98–107)
CHOLEST SERPL-MCNC: 152 MG/DL — SIGNIFICANT CHANGE UP
CK MB BLD-MCNC: 4.7 % — HIGH (ref 0–2.5)
CK MB CFR SERPL CALC: 2.2 NG/ML — SIGNIFICANT CHANGE UP
CK SERPL-CCNC: 47 U/L — SIGNIFICANT CHANGE UP (ref 30–200)
CO2 SERPL-SCNC: 21 MMOL/L — LOW (ref 22–31)
CO2 SERPL-SCNC: 21 MMOL/L — LOW (ref 22–31)
CREAT SERPL-MCNC: 1.06 MG/DL — SIGNIFICANT CHANGE UP (ref 0.5–1.3)
CREAT SERPL-MCNC: 1.07 MG/DL — SIGNIFICANT CHANGE UP (ref 0.5–1.3)
EGFR: 68 ML/MIN/1.73M2 — SIGNIFICANT CHANGE UP
EGFR: 68 ML/MIN/1.73M2 — SIGNIFICANT CHANGE UP
EGFR: 69 ML/MIN/1.73M2 — SIGNIFICANT CHANGE UP
EGFR: 69 ML/MIN/1.73M2 — SIGNIFICANT CHANGE UP
EOSINOPHIL # BLD AUTO: 3.21 K/UL — HIGH (ref 0–0.5)
EOSINOPHIL # BLD MANUAL: 2.54 K/UL — HIGH (ref 0–0.5)
EOSINOPHIL NFR BLD AUTO: 30.4 % — HIGH (ref 0–6)
EOSINOPHIL NFR BLD MANUAL: 24 % — HIGH (ref 0–6)
ESTIMATED AVERAGE GLUCOSE: 120 — SIGNIFICANT CHANGE UP
GLUCOSE BLDC GLUCOMTR-MCNC: 100 MG/DL — HIGH (ref 70–99)
GLUCOSE SERPL-MCNC: 100 MG/DL — HIGH (ref 70–99)
GLUCOSE SERPL-MCNC: 101 MG/DL — HIGH (ref 70–99)
HCT VFR BLD CALC: 42.6 % — SIGNIFICANT CHANGE UP (ref 39–50)
HCT VFR BLD CALC: 44.1 % — SIGNIFICANT CHANGE UP (ref 39–50)
HCT VFR BLDA CALC: 43 % — SIGNIFICANT CHANGE UP
HDLC SERPL-MCNC: 48 MG/DL — SIGNIFICANT CHANGE UP
HGB BLD CALC-MCNC: 14.3 G/DL — SIGNIFICANT CHANGE UP (ref 12.6–17.4)
HGB BLD-MCNC: 14.3 G/DL — SIGNIFICANT CHANGE UP (ref 13–17)
HGB BLD-MCNC: 14.7 G/DL — SIGNIFICANT CHANGE UP (ref 13–17)
IMM GRANULOCYTES # BLD AUTO: 0.05 K/UL — SIGNIFICANT CHANGE UP (ref 0–0.07)
IMM GRANULOCYTES NFR BLD AUTO: 0.5 % — SIGNIFICANT CHANGE UP (ref 0–0.9)
INR BLD: 1.02 RATIO — SIGNIFICANT CHANGE UP (ref 0.85–1.16)
LDLC SERPL-MCNC: 89 MG/DL — SIGNIFICANT CHANGE UP
LIPID PNL WITH DIRECT LDL SERPL: 89 MG/DL — SIGNIFICANT CHANGE UP
LYMPHOCYTES # BLD AUTO: 1.26 K/UL — SIGNIFICANT CHANGE UP (ref 1–3.3)
LYMPHOCYTES # BLD MANUAL: 1.06 K/UL — SIGNIFICANT CHANGE UP (ref 1–3.3)
LYMPHOCYTES # SPEC AUTO: 0 % — SIGNIFICANT CHANGE UP (ref 0–0)
LYMPHOCYTES NFR BLD AUTO: 11.9 % — LOW (ref 13–44)
LYMPHOCYTES NFR BLD MANUAL: 10 % — LOW (ref 13–44)
MAGNESIUM SERPL-MCNC: 1.8 MG/DL — SIGNIFICANT CHANGE UP (ref 1.6–2.6)
MANUAL OTHER LYMPHOCYTES #: 0 K/UL — SIGNIFICANT CHANGE UP (ref 0–0)
MANUAL REACTIVE LYMPHOCYTES #: 0.21 K/UL — SIGNIFICANT CHANGE UP (ref 0–0.63)
MANUAL SMEAR VERIFICATION: SIGNIFICANT CHANGE UP
MCHC RBC-ENTMCNC: 32.9 PG — SIGNIFICANT CHANGE UP (ref 27–34)
MCHC RBC-ENTMCNC: 33.2 PG — SIGNIFICANT CHANGE UP (ref 27–34)
MCHC RBC-ENTMCNC: 33.3 G/DL — SIGNIFICANT CHANGE UP (ref 32–36)
MCHC RBC-ENTMCNC: 33.6 G/DL — SIGNIFICANT CHANGE UP (ref 32–36)
MCV RBC AUTO: 98.2 FL — SIGNIFICANT CHANGE UP (ref 80–100)
MCV RBC AUTO: 99.5 FL — SIGNIFICANT CHANGE UP (ref 80–100)
MONOCYTES # BLD AUTO: 0.75 K/UL — SIGNIFICANT CHANGE UP (ref 0–0.9)
MONOCYTES # BLD MANUAL: 0.95 K/UL — HIGH (ref 0–0.9)
MONOCYTES NFR BLD AUTO: 7.1 % — SIGNIFICANT CHANGE UP (ref 2–14)
MONOCYTES NFR BLD MANUAL: 9 % — SIGNIFICANT CHANGE UP (ref 2–14)
NEUTROPHILS # BLD AUTO: 5.23 K/UL — SIGNIFICANT CHANGE UP (ref 1.8–7.4)
NEUTROPHILS # BLD MANUAL: 5.81 K/UL — SIGNIFICANT CHANGE UP (ref 1.8–7.4)
NEUTROPHILS NFR BLD AUTO: 49.4 % — SIGNIFICANT CHANGE UP (ref 43–77)
NEUTROPHILS NFR BLD MANUAL: 55 % — SIGNIFICANT CHANGE UP (ref 43–77)
NONHDLC SERPL-MCNC: 104 MG/DL — SIGNIFICANT CHANGE UP
NRBC # BLD AUTO: 0 K/UL — SIGNIFICANT CHANGE UP (ref 0–0)
NRBC # BLD AUTO: 0 K/UL — SIGNIFICANT CHANGE UP (ref 0–0)
NRBC # FLD: 0 K/UL — SIGNIFICANT CHANGE UP (ref 0–0)
NRBC # FLD: 0 K/UL — SIGNIFICANT CHANGE UP (ref 0–0)
NRBC BLD AUTO-RTO: 0 /100 WBCS — SIGNIFICANT CHANGE UP (ref 0–0)
NRBC BLD AUTO-RTO: 0 /100 WBCS — SIGNIFICANT CHANGE UP (ref 0–0)
PHOSPHATE SERPL-MCNC: 2.9 MG/DL — SIGNIFICANT CHANGE UP (ref 2.5–4.5)
PLAT MORPH BLD: ABNORMAL
PLATELET # BLD AUTO: 212 K/UL — SIGNIFICANT CHANGE UP (ref 150–400)
PLATELET # BLD AUTO: 234 K/UL — SIGNIFICANT CHANGE UP (ref 150–400)
PLATELET CLUMP BLD QL SMEAR: SLIGHT
PMV BLD: 9.5 FL — SIGNIFICANT CHANGE UP (ref 7–13)
PMV BLD: 9.6 FL — SIGNIFICANT CHANGE UP (ref 7–13)
POTASSIUM SERPL-MCNC: 4.2 MMOL/L — SIGNIFICANT CHANGE UP (ref 3.5–5.3)
POTASSIUM SERPL-MCNC: 4.3 MMOL/L — SIGNIFICANT CHANGE UP (ref 3.5–5.3)
POTASSIUM SERPL-SCNC: 4.2 MMOL/L — SIGNIFICANT CHANGE UP (ref 3.5–5.3)
POTASSIUM SERPL-SCNC: 4.3 MMOL/L — SIGNIFICANT CHANGE UP (ref 3.5–5.3)
PROT SERPL-MCNC: 6.2 G/DL — SIGNIFICANT CHANGE UP (ref 6–8.3)
PROTHROM AB SERPL-ACNC: 12.1 SEC — SIGNIFICANT CHANGE UP (ref 9.9–13.4)
RBC # BLD: 4.34 M/UL — SIGNIFICANT CHANGE UP (ref 4.2–5.8)
RBC # BLD: 4.43 M/UL — SIGNIFICANT CHANGE UP (ref 4.2–5.8)
RBC # FLD: 13 % — SIGNIFICANT CHANGE UP (ref 10.3–14.5)
RBC # FLD: 13.1 % — SIGNIFICANT CHANGE UP (ref 10.3–14.5)
RBC BLD AUTO: NORMAL — SIGNIFICANT CHANGE UP
RH IG SCN BLD-IMP: NEGATIVE — SIGNIFICANT CHANGE UP
SAO2 % BLDV: 67 % — SIGNIFICANT CHANGE UP (ref 67–88)
SODIUM SERPL-SCNC: 138 MMOL/L — SIGNIFICANT CHANGE UP (ref 135–145)
SODIUM SERPL-SCNC: 140 MMOL/L — SIGNIFICANT CHANGE UP (ref 135–145)
TRIGL SERPL-MCNC: 81 MG/DL — SIGNIFICANT CHANGE UP
TROPONIN T, HIGH SENSITIVITY RESULT: 33 NG/L — SIGNIFICANT CHANGE UP
VARIANT LYMPHS # BLD: 2 % — SIGNIFICANT CHANGE UP (ref 0–6)
VARIANT LYMPHS NFR BLD MANUAL: 2 % — SIGNIFICANT CHANGE UP (ref 0–6)
WBC # BLD: 10.45 K/UL — SIGNIFICANT CHANGE UP (ref 3.8–10.5)
WBC # BLD: 10.57 K/UL — HIGH (ref 3.8–10.5)
WBC # FLD AUTO: 10.45 K/UL — SIGNIFICANT CHANGE UP (ref 3.8–10.5)
WBC # FLD AUTO: 10.57 K/UL — HIGH (ref 3.8–10.5)

## 2025-07-01 PROCEDURE — 93308 TTE F-UP OR LMTD: CPT | Mod: 26,GC

## 2025-07-01 PROCEDURE — 99223 1ST HOSP IP/OBS HIGH 75: CPT

## 2025-07-01 PROCEDURE — 70450 CT HEAD/BRAIN W/O DYE: CPT | Mod: 26,XU

## 2025-07-01 PROCEDURE — 93010 ELECTROCARDIOGRAM REPORT: CPT

## 2025-07-01 PROCEDURE — 0042T: CPT

## 2025-07-01 PROCEDURE — 70498 CT ANGIOGRAPHY NECK: CPT | Mod: 26

## 2025-07-01 PROCEDURE — 70496 CT ANGIOGRAPHY HEAD: CPT | Mod: 26

## 2025-07-01 PROCEDURE — 99291 CRITICAL CARE FIRST HOUR: CPT | Mod: GC

## 2025-07-01 RX ORDER — TENECTEPLASE 50 MG
25 KIT INTRAVENOUS ONCE
Refills: 0 | Status: COMPLETED | OUTPATIENT
Start: 2025-07-01 | End: 2025-07-01

## 2025-07-01 RX ORDER — ACETAMINOPHEN 500 MG/5ML
1000 LIQUID (ML) ORAL ONCE
Refills: 0 | Status: COMPLETED | OUTPATIENT
Start: 2025-07-01 | End: 2025-07-01

## 2025-07-01 RX ORDER — IPRATROPIUM BROMIDE AND ALBUTEROL SULFATE .5; 2.5 MG/3ML; MG/3ML
3 SOLUTION RESPIRATORY (INHALATION) EVERY 6 HOURS
Refills: 0 | Status: DISCONTINUED | OUTPATIENT
Start: 2025-07-01 | End: 2025-07-03

## 2025-07-01 RX ORDER — ATORVASTATIN CALCIUM 80 MG/1
80 TABLET, FILM COATED ORAL AT BEDTIME
Refills: 0 | Status: DISCONTINUED | OUTPATIENT
Start: 2025-07-01 | End: 2025-07-03

## 2025-07-01 RX ORDER — ALBUTEROL SULFATE 2.5 MG/3ML
2 VIAL, NEBULIZER (ML) INHALATION
Refills: 0 | DISCHARGE

## 2025-07-01 RX ORDER — B1/B2/B3/B5/B6/B12/VIT C/FOLIC 500-0.5 MG
1 TABLET ORAL
Refills: 0 | DISCHARGE

## 2025-07-01 RX ORDER — CLOPIDOGREL BISULFATE 75 MG/1
1 TABLET, FILM COATED ORAL
Refills: 0 | DISCHARGE

## 2025-07-01 RX ADMIN — TENECTEPLASE 3600 MILLIGRAM(S): KIT at 10:40

## 2025-07-01 RX ADMIN — Medication 10 MILLILITER(S): at 10:40

## 2025-07-01 RX ADMIN — ATORVASTATIN CALCIUM 80 MILLIGRAM(S): 80 TABLET, FILM COATED ORAL at 22:36

## 2025-07-01 RX ADMIN — Medication 1000 MILLIGRAM(S): at 13:04

## 2025-07-01 RX ADMIN — Medication 400 MILLIGRAM(S): at 12:34

## 2025-07-01 RX ADMIN — Medication 1 DOSE(S): at 22:36

## 2025-07-01 RX ADMIN — Medication 1 DOSE(S): at 07:34

## 2025-07-01 NOTE — CHART NOTE - NSCHARTNOTEFT_GEN_A_CORE
The patient is s/p RHC and MEMs implant.   As per Dr. Mejia, hold Eliquis tonight, restart on 7/2/25 in AM. Continue Plavix.

## 2025-07-01 NOTE — OCCUPATIONAL THERAPY INITIAL EVALUATION ADULT - PREDICTED DURATION OF THERAPY (DAYS/WKS), OT EVAL
Pt tripped ons side walk at 3pm yesterday, landed on right knee. No head trauma, no LOC. Pt is on coumadin 7.5mg for hx of mitral valave replacement.  INR 3.54, + ecchymosis to right kne. Anterior, posterior drawer test and Vulgus/Varus test are negative. No bony deformities, +knee flexion and extension intact, cap refill < 2 secs, pedal pulses intact.  Pt is able to ambulate. cane provided for extra stability. ~2 weeks INR 3.54, + ecchymosis to right kne. Anterior, posterior drawer test and Vulgus/Varus test are negative. No bony deformities, +knee flexion and extension intact, cap refill < 2 secs, pedal pulses intact.  Pt is able to ambulate. Pt is well appearing walking with normal gait, stable for discharge and follow up with medical doctor. Pt educated on care and need for follow up. Discussed anticipatory guidance and return precautions. Questions answered. I had a detailed discussion with the patient and/or guardian regarding the historical points, exam findings, and any diagnostic results supporting the discharge diagnosis.. Contact for ortho f/u provided. Pt will contact her cardiologist concerning any Coumadin adjustment.

## 2025-07-01 NOTE — PHYSICAL THERAPY INITIAL EVALUATION ADULT - LEVEL OF INDEPENDENCE: SIT/SUPINE, REHAB EVAL
functional mobility held secondary to pt on active bedrest; received TNK at 10:40AM; will assess when medically appropriate

## 2025-07-01 NOTE — OCCUPATIONAL THERAPY INITIAL EVALUATION ADULT - GENERAL OBSERVATIONS, REHAB EVAL
Pt received supine in bed in NAD +IVs +BP cuff +SpO2 monitor in NAD, all lines intact. Pt OK to be seen for OT per ANNE Sandy. SpO2 99% on RA, HR 76 BPM.

## 2025-07-01 NOTE — PHYSICAL THERAPY INITIAL EVALUATION ADULT - ADDITIONAL COMMENTS
Pt lives in a private house, +2 steps to enter, was independent with all functional mobility and ADL performance using a Single Axis Cane occasionally, otherwise does not use an assistive device.     Pt left semi-supine in bed, all lines intact, all needs in reach, bed alarm set, in NAD. ANNE Errol aware. Heart rate 74 beats per minute.

## 2025-07-01 NOTE — PHARMACOTHERAPY INTERVENTION NOTE - COMMENTS
Tenecteplase was prepared by me, verified with  ____. Weight 101 kG --> 0.25 mG/kG --> 25 mG Tenecteplase (max dose for stroke). 5 mL drawn up for administration.      Codi Vera, Pharm.D.  Clinical Pharmacy Specialist  Spectra: [phone] Tenecteplase was prepared by me, verified with Dr. Campos. Weight 101 kG --> 0.25 mG/kG --> 25 mG Tenecteplase (max dose for stroke). 5 mL drawn up for administration.      Codi Vera, Pharm.D.  Clinical Pharmacy Specialist  Spectra: [phone]

## 2025-07-01 NOTE — ASU PATIENT PROFILE, ADULT - NS PRO AD PATIENT TYPE
tal Vela/Health Care Proxy (HCP)/Do Not Resuscitate (DNR)/Medical Orders for Life-Sustaining Treatment (MOLST)

## 2025-07-01 NOTE — OCCUPATIONAL THERAPY INITIAL EVALUATION ADULT - LIVES WITH, PROFILE
Pt resides in a private home with his son, with 2 steps to enter, no steps to negotiate inside, bathroom is handicap accessible with a walk-in shower (+) shower chair (+) grab bars, a raised toilet seat with grab bars next to toilet. Pt owns a rolling walker and cane./children

## 2025-07-01 NOTE — CONSULT NOTE ADULT - ASSESSMENT
RICHAR 8:40 AM  NIHSS 5  preMRS 2      CTH  CTA  CTP    Impression: Acute onset R sensorimotor syndrome, concern for acute ischemic stroke. LKW 8:40 AM on 7/1/2025  NIHSS 5  preMRS 2  Pt received IV tenecteplase at 10:40 AM on 7/1/2025  Pt not candidate for thrombectomy because no LVO      CTH - no hemorrhage  CTA - no LVO or flow limiting stenosis  CTP - no core infarct. Tmax greater than 6 seconds = 32 mL and involves the bilateral posterior temporal and parietal lobes. The distribution is somewhat watershed in nature. Findings may be related to the presence of cardiac disease.    Impression: Acute onset R sensorimotor syndrome, localizes to L Brain dysfunction, possibly subcortical/thalamocapsular localization given absence of cortical signs. Given acute onset, concern for acute ischemic stroke. Competing stroke mechanisms between cardioembolic i/s/o AFib vs periprocedural. LKW 8:40 AM on 7/1/2025  NIHSS 5  preMRS 2  Pt received IV tenecteplase at 10:40 AM on 7/1/2025  Pt not candidate for thrombectomy because no LVO    CTH - no hemorrhage  CTA - no LVO or flow limiting stenosis  CTP - no core infarct. Tmax greater than 6 seconds = 32 mL and involves the bilateral posterior temporal and parietal lobes. The distribution is somewhat watershed in nature. Findings may be related to the presence of cardiac disease.    Impression: Acute onset R sensorimotor syndrome. Localizes to L Brain dysfunction, possibly subcortical/thalamocapsular localization given absence of cortical signs. Given acute onset, high suspicion for acute ischemic stroke, for which patient received tenecteplase. Competing stroke mechanisms between cardioembolic i/s/o AFib vs periprocedural.    Recommendations:  [] MICU for further monitoring post Tenecteplase  [] MRI brain w/o contrast to evaluate for infarction  [] Repeat CTH 24 hours after tenecteplase administration. If change in neuro exam, STAT CT Head noncontrast  [] Given known AFib on Eliquis, TTE unlikely to change stroke management.  [] BP goal 180/105 per tenecteplase protocol.  [] Ok to resume clopidogrel in 24 hours if repeat scans negative for hemorrhage  [] Ok for pharmacologic DVT ppx  in 24 hours if repeat scans negative for hemorrhage  [] Atorvastatin 80mg (titrate to LDL < 70)  [] PT/OT  [] S/S evaluation  [] Dysphagia screen. If fails, speech/swallow eval  [] aspiration, fall precautions  [] Glucose control   [] Stroke education and counseling    Important things after tenecteplase administration:  >Neurochecks: Every 15 mins x two hours, then every 30 mins x6 hours, then every hour x 16 hours.   >No mendez removal or placement for 24 hours   >No venous/arterial puncture at non-compressible site   >No anticoagulation or anti-platelet agents for 24 hours   >Cardene drip as needed to keep BP < 180/105    Case discussed with stroke fellow Dr. Gonsalez, under supervision of stroke attending Dr. Libman     LKW 8:40 AM on 7/1/2025  NIHSS 5  preMRS 2  Pt received IV tenecteplase at 10:40 AM on 7/1/2025  Pt not candidate for thrombectomy because no LVO    CTH - no hemorrhage  CTA - no LVO or flow limiting stenosis  CTP - no core infarct. Tmax greater than 6 seconds = 32 mL and involves the bilateral posterior temporal and parietal lobes. The distribution is somewhat watershed in nature. Findings may be related to the presence of cardiac disease.    Impression: Acute onset R sensorimotor syndrome. Localizes to L Brain dysfunction, possibly subcortical/thalamocapsular localization given absence of cortical signs. Given acute onset, high suspicion for acute ischemic stroke, for which patient received tenecteplase. Competing stroke mechanisms between cardioembolic i/s/o AFib vs periprocedural.    Recommendations:  [] MICU for further monitoring post Tenecteplase  [] MRI brain w/o contrast to evaluate for infarction  [] Repeat CTH 24 hours after tenecteplase administration. If change in neuro exam, STAT CT Head noncontrast  [] Given known AFib on Eliquis, TTE unlikely to change stroke management.  [] BP goal 180/105 per tenecteplase protocol.  [] Ok to resume clopidogrel (given for CAD/stent indication) in 24 hours if repeat scans negative for hemorrhage  [] Ok for pharmacologic DVT ppx  in 24 hours if repeat scans negative for hemorrhage  [] Will discuss when safe to resume patient's Eliquis (for indication of AFib)  [] Atorvastatin 80mg (titrate to LDL < 70)  [] a1c, lipid panel  [] PT/OT  [] S/S evaluation  [] Dysphagia screen. If fails, speech/swallow eval  [] aspiration, fall precautions  [] Glucose control   [] Stroke education and counseling    Important things after tenecteplase administration:  >Neurochecks: Every 15 mins x two hours, then every 30 mins x6 hours, then every hour x 16 hours.   >No mendez removal or placement for 24 hours   >No venous/arterial puncture at non-compressible site   >No anticoagulation or anti-platelet agents for 24 hours   >Cardene drip as needed to keep BP < 180/105    Case discussed with stroke fellow Dr. Gonsalez, under supervision of stroke attending Dr. Libman     LKW 8:40 AM on 7/1/2025  NIHSS 5 (+1 for RUE drift, +2 for RLE drift, +2 for severe R sided sensory loss)  preMRS 2  Pt received IV tenecteplase at 10:40 AM on 7/1/2025  Pt not candidate for thrombectomy because no LVO    CTH - no hemorrhage  CTA - no LVO or flow limiting stenosis  CTP - no core infarct. Tmax greater than 6 seconds = 32 mL and involves the bilateral posterior temporal and parietal lobes. The distribution is somewhat watershed in nature. Findings may be related to the presence of cardiac disease.    Impression: Acute onset R sensorimotor syndrome. Localizes to L Brain dysfunction, possibly subcortical/thalamocapsular localization given absence of cortical signs. Given acute onset, high suspicion for acute ischemic stroke, for which patient received tenecteplase. Competing stroke mechanisms between cardioembolic i/s/o AFib vs periprocedural.    Recommendations:  [] MICU for further monitoring post Tenecteplase  [] MRI brain w/o contrast to evaluate for infarction  [] Repeat CTH 24 hours after tenecteplase administration. If change in neuro exam, STAT CT Head noncontrast  [] Given known AFib on Eliquis, TTE unlikely to change stroke management.  [] BP goal 180/105 per tenecteplase protocol.  [] Ok to resume clopidogrel (given for CAD/stent indication) in 24 hours if repeat scans negative for hemorrhage  [] Ok for pharmacologic DVT ppx  in 24 hours if repeat scans negative for hemorrhage  [] Will discuss when safe to resume patient's Eliquis (for indication of AFib)  [] Atorvastatin 80mg (titrate to LDL < 70)  [] a1c, lipid panel  [] PT/OT  [] S/S evaluation  [] Dysphagia screen. If fails, speech/swallow eval  [] aspiration, fall precautions  [] Glucose control   [] Stroke education and counseling    Important things after tenecteplase administration:  >Neurochecks: Every 15 mins x two hours, then every 30 mins x6 hours, then every hour x 16 hours.   >No mendez removal or placement for 24 hours   >No venous/arterial puncture at non-compressible site   >No anticoagulation or anti-platelet agents for 24 hours   >Cardene drip as needed to keep BP < 180/105    Case discussed with stroke fellow Dr. Gonsalez, under supervision of stroke attending Dr. Libman     85 RH M pmhx AFib (on Eliquis, last dose on 6/28/25), CAD with stents (on Plavix), Nonischemic cardiomyopathy with AICD, COPD, HTN, HLD, GERD, who presented to hospital for Right Heart Catheterization with CardioMEMS placement (R femoral vein puncture site) on 7/1/2025. After procedure (LKW 8:40 AM on 7/1/2025), pt developed R sided weakness and numbness (NIHSS 5). CTH/A/P unrevealing. Due to high suspicion for a disabling acute ischemic stroke, pt received Tenecteplase at 10:40 AM on 7/1/2025. Patient to be admitted for post tenecteplase monitoring and undergo further stroke workup.    LKW 8:40 AM on 7/1/2025  NIHSS 5 (+1 for RUE drift, +2 for RLE drift, +2 for severe R sided sensory loss)  preMRS 2  Pt received IV tenecteplase at 10:40 AM on 7/1/2025  Pt not candidate for thrombectomy because no LVO    CTH - no hemorrhage  CTA - no LVO or flow limiting stenosis  CTP - no core infarct. Tmax greater than 6 seconds = 32 mL and involves the bilateral posterior temporal and parietal lobes. The distribution is somewhat watershed in nature. Findings may be related to the presence of cardiac disease.    Impression: Acute onset R sensorimotor syndrome. Localizes to L Brain dysfunction, possibly subcortical/thalamocapsular localization given absence of cortical signs. Given acute onset, high suspicion for acute ischemic stroke, for which patient received tenecteplase. Competing stroke mechanisms between cardioembolic i/s/o AFib vs periprocedural.    Recommendations:  [] MICU for further monitoring post Tenecteplase  [] MRI brain w/o contrast to evaluate for infarction  [] Repeat CTH 24 hours after tenecteplase administration. If change in neuro exam, STAT CT Head noncontrast  [] Given known AFib on Eliquis, TTE unlikely to change stroke management.  [] BP goal 180/105 per tenecteplase protocol.  [] Ok to resume clopidogrel (given for CAD/stent indication) in 24 hours if repeat scans negative for hemorrhage  [] Ok for pharmacologic DVT ppx  in 24 hours if repeat scans negative for hemorrhage  [] Will discuss when safe to resume patient's Eliquis (for indication of AFib)  [] Atorvastatin 80mg (titrate to LDL < 70)  [] a1c, lipid panel  [] PT/OT  [] S/S evaluation  [] Dysphagia screen. If fails, speech/swallow eval  [] aspiration, fall precautions  [] Glucose control   [] Stroke education and counseling    Important things after tenecteplase administration:  >Neurochecks: Every 15 mins x two hours, then every 30 mins x6 hours, then every hour x 16 hours.   >No mendez removal or placement for 24 hours   >No venous/arterial puncture at non-compressible site   >No anticoagulation or anti-platelet agents for 24 hours   >Cardene drip as needed to keep BP < 180/105    Case discussed with stroke fellow Dr. Gonsalez, under supervision of stroke attending Dr. Libman

## 2025-07-01 NOTE — OCCUPATIONAL THERAPY INITIAL EVALUATION ADULT - LEVEL OF INDEPENDENCE: DRESS LOWER BODY, OT EVAL
Due to bedrest orders. Per RN Patient to lay flat in bed s/p procedure at this time, unable to complete lower body dressing/dependent (less than 25% patients effort)/unable to perform

## 2025-07-01 NOTE — PHYSICAL THERAPY INITIAL EVALUATION ADULT - MANUAL MUSCLE TESTING RESULTS, REHAB EVAL
Right UE grossly 3/5, Left UE grossly 5/5; Right hip 1/5, Right knee unable to be assessed due to tightness and patient complaining of pain. Right ankle grossly 3/5 throughout; Left LE grossly 5/5 throughout

## 2025-07-01 NOTE — ASU DISCHARGE PLAN (ADULT/PEDIATRIC) - NS MD DC FALL RISK RISK
For information on Fall & Injury Prevention, visit: [email or web address] OR  [email or web address] OR  https://www.cdc.gov/steadi/patient.html

## 2025-07-01 NOTE — PHYSICAL THERAPY INITIAL EVALUATION ADULT - PERTINENT HX OF CURRENT PROBLEM, REHAB EVAL
85 year old Male with past medical history presents today for elective Right heart catheterization and CardioMEM implant, complaining of Right side numbness, unable to move Right LE and decreased strength in Right arm 15 -20 min upon arrival to [facility] post procedure.

## 2025-07-01 NOTE — CONSULT NOTE ADULT - TIME BILLING
85-year-old ? handed gentleman evaluated at [facility] on 7/1/2025 with right hemiparesis.  History and exam as above.  ROS otherwise negative.  CT head (7/1/2025) to my eye was unremarkable, with only extremely vague and subtle possible hypodensity in the left thalamocapsular region, probably artifactual.  CTA neck (7/1/2025) to my eye showed calcified plaque at both carotid bifurcations, probably without significant stenosis.  CTA head (7/1/2025) to my eye showed multifocal calcified plaque, most likely all without significant stenosis: Bilateral vertebral arteries, left more so than right; bilateral carotid siphons.    Impression.  History of atrial fibrillation on apixaban, held for several days.  Today, he underwent cardiac cath; shortly thereafter he noted right hemiparesis, leg >arm >face, with right hemisensory loss, a sensory-motor syndrome.  His presentation is consistent with left hemispheric dysfunction, perhaps in the parasagittal region, most likely due to acute ischemic stroke.  He was treated with IV tenecteplase.  No role for extensive neurovascular workup.  Suggest.  After 24-hour CT head, depending on imaging findings, may be able to restart apixaban immediately, or may start aspirin and chemical DVT prophylaxis (decision after CT); careful observation for development of groin hematoma; atorvastatin-titrate as per cardiology medications; PT/OT.

## 2025-07-01 NOTE — PHYSICAL THERAPY INITIAL EVALUATION ADULT - GROSSLY INTACT, SENSORY
pt reports diminished sensation from Right hip down to Right ankle; otherwise grossly intact throughout

## 2025-07-01 NOTE — CONSULT NOTE ADULT - SUBJECTIVE AND OBJECTIVE BOX
Neurology - Consult Note    -  Spectra: [phone] ([facility]), [phone] ([facility])  -    HPI: 85 RH M pmhx AFib (on Eliquis, last dose on 6/28/25), CAD with stents (on Plavix), Nonischemic cardiomyopathy with AICD, COPD, HTN, HLD, GERD, who presented to hospital for Right Heart Catheterization with CardioMEMS placement (R femoral vein puncture site) on 7/1/2025 . Patient underwent procedure at approximately 7:30 AM. Procedure successful, patient came to interventional recovery unit afterwards, and had no neurologic symptoms. LKW 8:40 AM, confirmed by interventional recovery nurse. At approximately 9:10 AM, pt noted to have R sided weakness and numbness, for which RRT was called and subsequently upgraded to code stroke.        Allergies:  latex (Rash)  neoprene - rash (Other)  morphine (Vomiting)      PMHx/PSHx/Family Hx: As above, otherwise see below   GERD (gastroesophageal reflux disease)    CAD (coronary artery disease)    Stented coronary artery    HTN (hypertension)    Obesity    Hypercholesterolemia    Osteoarthritis    AAA (abdominal aortic aneurysm)    AF (atrial fibrillation)    Aortic aneurysm, thoracic    NICM (nonischemic cardiomyopathy)    Chronic obstructive pulmonary disease, unspecified COPD type    AICD (automatic cardioverter/defibrillator) present        Social Hx:  No current use of tobacco, alcohol, or illicit drugs    Medications:  MEDICATIONS  (STANDING):  sodium chloride 0.9% lock flush 3 milliLiter(s) IV Push every 8 hours    MEDICATIONS  (PRN):      Vitals:  T(C): 37 (07-01-25 @ 08:40), Max: 37 (07-01-25 @ 08:40)  HR: 72 (07-01-25 @ 09:40) (70 - 76)  BP: 111/81 (07-01-25 @ 10:55) (110/70 - 135/80)  RR: 18 (07-01-25 @ 10:55) (15 - 20)  SpO2: 100% (07-01-25 @ 10:55) (96% - 100%)    Physical Examination:  General - NAD    Neurologic Exam:  Mental status - Awake, Alert, Oriented to person, place, and time. Speech fluent, repetition and naming intact. Follows simple and complex commands.    Cranial nerves - PERRL, VFF, EOMI, face sensation (V1-V3) intact b/l, facial strength intact without asymmetry b/l, hearing intact b/l, palate with symmetric elevation, trapezius 5/5 strength b/l, tongue midline on protrusion with full lateral movement    Motor - Normal Bulk and tone. +RUE mild drift. +RLE drift to bed. On manual motor exam LUE and LLE 5/5. RUE 4/5, RLE 3/5.    Sensation - Significantly decreased to LT throughout RUE and RLE     DTR's - Deferred due to focused neuro exam in acute situation    Coordination - no dysmetria b/l FTN or LLE HTS. Unable to check RLE HTS because of weakness.    Gait and station - Deferred due to focused neuro exam in acute situation    Labs:                        14.3   10.57 )-----------( 212      ( 01 Jul 2025 10:06 )             42.6     07-01    138  |  104  |  16  ----------------------------<  101[H]  4.3   |  21[L]  |  1.07    Ca    9.6      01 Jul 2025 10:06  Phos  2.9     07-01  Mg     1.80     07-01    TPro  6.2  /  Alb  3.7  /  TBili  0.5  /  DBili  x   /  AST  20  /  ALT  11  /  AlkPhos  52  07-01    CAPILLARY BLOOD GLUCOSE      POCT Blood Glucose.: 100 mg/dL (01 Jul 2025 09:42)    LIVER FUNCTIONS - ( 01 Jul 2025 10:06 )  Alb: 3.7 g/dL / Pro: 6.2 g/dL / ALK PHOS: 52 U/L / ALT: 11 U/L / AST: 20 U/L / GGT: x             PT/INR - ( 01 Jul 2025 10:06 )   PT: 12.1 sec;   INR: 1.02 ratio         PTT - ( 01 Jul 2025 10:06 )  PTT:28.7 sec  CSF:                  Radiology:     Neurology - Consult Note    -  Spectra: [phone] ([facility]), [phone] ([facility])  -    HPI: 85 RH M pmhx AFib (on Eliquis, last dose on 6/28/25), CAD with stents (on Plavix), Nonischemic cardiomyopathy with AICD, COPD, HTN, HLD, GERD, who presented to hospital for Right Heart Catheterization with CardioMEMS placement (R femoral vein puncture site) on 7/1/2025 . Patient underwent procedure at approximately 7:30 AM. Procedure successful, patient came to interventional recovery unit afterwards, and had no neurologic symptoms. LKW 8:40 AM, confirmed by interventional recovery nurse. At approximately 9:10 AM, pt noted to have R sided weakness and numbness, for which RRT was called and subsequently upgraded to code stroke. Pt said the R sided weakness and numbness started all of a sudden and came out of nowhere. Pt has never had these symptoms before. Pt denies blurry vision, double vision, tinnitus, vertigo, LoC, involuntary movements, slurred speech, dysphagia. At baseline pt uses cane but is fully independent and still works part time. Denies prior strokes.    Pt had TTE on 5/10/25, showing severely decreased left ventricular systolic function with left ventricular aneurysm but no left ventricular thrombus.          Allergies:  latex (Rash)  neoprene - rash (Other)  morphine (Vomiting)      PMHx/PSHx/Family Hx: As above, otherwise see below   GERD (gastroesophageal reflux disease)    CAD (coronary artery disease)    Stented coronary artery    HTN (hypertension)    Obesity    Hypercholesterolemia    Osteoarthritis    AAA (abdominal aortic aneurysm)    AF (atrial fibrillation)    Aortic aneurysm, thoracic    NICM (nonischemic cardiomyopathy)    Chronic obstructive pulmonary disease, unspecified COPD type    AICD (automatic cardioverter/defibrillator) present    Medications:  MEDICATIONS  (STANDING):  sodium chloride 0.9% lock flush 3 milliLiter(s) IV Push every 8 hours    MEDICATIONS  (PRN):      Vitals:  T(C): 37 (07-01-25 @ 08:40), Max: 37 (07-01-25 @ 08:40)  HR: 72 (07-01-25 @ 09:40) (70 - 76)  BP: 111/81 (07-01-25 @ 10:55) (110/70 - 135/80)  RR: 18 (07-01-25 @ 10:55) (15 - 20)  SpO2: 100% (07-01-25 @ 10:55) (96% - 100%)    Physical Examination:  General - NAD    Neurologic Exam:  Mental status - Awake, Alert, Oriented to person, place, and time. Speech fluent, repetition and naming intact. Follows simple and complex commands.    Cranial nerves - PERRL, VFF, EOMI, face sensation (V1-V3) intact b/l, facial strength intact without asymmetry b/l, hearing intact b/l, palate with symmetric elevation, trapezius 5/5 strength b/l, tongue midline on protrusion with full lateral movement    Motor - Normal Bulk and tone. +RUE mild drift. +RLE drift to bed. On manual motor exam LUE and LLE 5/5. RUE 4/5, RLE 3/5.    Sensation - Significantly decreased to LT throughout RUE and RLE     DTR's - Deferred due to focused neuro exam in acute situation    Coordination - no dysmetria b/l FTN or LLE HTS. Unable to check RLE HTS because of weakness.    Gait and station - Deferred due to focused neuro exam in acute situation    Labs:                        14.3   10.57 )-----------( 212      ( 01 Jul 2025 10:06 )             42.6     07-01    138  |  104  |  16  ----------------------------<  101[H]  4.3   |  21[L]  |  1.07    Ca    9.6      01 Jul 2025 10:06  Phos  2.9     07-01  Mg     1.80     07-01    TPro  6.2  /  Alb  3.7  /  TBili  0.5  /  DBili  x   /  AST  20  /  ALT  11  /  AlkPhos  52  07-01    CAPILLARY BLOOD GLUCOSE      POCT Blood Glucose.: 100 mg/dL (01 Jul 2025 09:42)    LIVER FUNCTIONS - ( 01 Jul 2025 10:06 )  Alb: 3.7 g/dL / Pro: 6.2 g/dL / ALK PHOS: 52 U/L / ALT: 11 U/L / AST: 20 U/L / GGT: x             PT/INR - ( 01 Jul 2025 10:06 )   PT: 12.1 sec;   INR: 1.02 ratio         PTT - ( 01 Jul 2025 10:06 )  PTT:28.7 sec  CSF:                  Radiology:     Neurology - Consult Note    -  Spectra: [phone] ([facility]), [phone] ([facility])  -    HPI: 85 RH M pmhx AFib (on Eliquis, last dose on 6/28/25), CAD with stents (on Plavix), Nonischemic cardiomyopathy with AICD, COPD, HTN, HLD, GERD, who presented to hospital for Right Heart Catheterization with CardioMEMS placement (R femoral vein puncture site) on 7/1/2025. Patient underwent procedure at approximately 7:30 AM. Procedure successful, patient came to interventional recovery unit afterwards, and had no neurologic symptoms. LKW 8:40 AM, confirmed by interventional recovery nurse. At approximately 9:10 AM, pt noted to have R sided weakness and numbness, for which RRT was called and subsequently upgraded to code stroke. Pt said the R sided weakness and numbness started all of a sudden and came out of nowhere. Pt has never had these symptoms before. Pt denies blurry vision, double vision, tinnitus, vertigo, LoC, involuntary movements, slurred speech, dysphagia. At baseline pt uses cane but is fully independent and still works part time. Denies prior strokes.    Pt had TTE on 5/10/25, showing severely decreased left ventricular systolic function with left ventricular aneurysm but no left ventricular thrombus.          Allergies:  latex (Rash)  neoprene - rash (Other)  morphine (Vomiting)      PMHx/PSHx/Family Hx: As above, otherwise see below   GERD (gastroesophageal reflux disease)    CAD (coronary artery disease)    Stented coronary artery    HTN (hypertension)    Obesity    Hypercholesterolemia    Osteoarthritis    AAA (abdominal aortic aneurysm)    AF (atrial fibrillation)    Aortic aneurysm, thoracic    NICM (nonischemic cardiomyopathy)    Chronic obstructive pulmonary disease, unspecified COPD type    AICD (automatic cardioverter/defibrillator) present    Medications:  MEDICATIONS  (STANDING):  sodium chloride 0.9% lock flush 3 milliLiter(s) IV Push every 8 hours    MEDICATIONS  (PRN):      Vitals:  T(C): 37 (07-01-25 @ 08:40), Max: 37 (07-01-25 @ 08:40)  HR: 72 (07-01-25 @ 09:40) (70 - 76)  BP: 111/81 (07-01-25 @ 10:55) (110/70 - 135/80)  RR: 18 (07-01-25 @ 10:55) (15 - 20)  SpO2: 100% (07-01-25 @ 10:55) (96% - 100%)    Physical Examination:  General - NAD    Neurologic Exam:  Mental status - Awake, Alert, Oriented to person, place, and time. Speech fluent, repetition and naming intact. Follows simple and complex commands.    Cranial nerves - PERRL, VFF, EOMI, face sensation (V1-V3) intact b/l, facial strength intact without asymmetry b/l, hearing intact b/l, palate with symmetric elevation, trapezius 5/5 strength b/l, tongue midline on protrusion with full lateral movement    Motor - Normal Bulk and tone. +RUE mild drift. +RLE drift to bed. On manual motor exam LUE and LLE 5/5. RUE 4/5, RLE 3/5.    Sensation - Significantly decreased to LT throughout RUE and RLE     DTR's - Deferred due to focused neuro exam in acute situation    Coordination - no dysmetria b/l FTN or LLE HTS. Unable to check RLE HTS because of weakness.    Gait and station - Deferred due to focused neuro exam in acute situation    Labs:                        14.3   10.57 )-----------( 212      ( 01 Jul 2025 10:06 )             42.6     07-01    138  |  104  |  16  ----------------------------<  101[H]  4.3   |  21[L]  |  1.07    Ca    9.6      01 Jul 2025 10:06  Phos  2.9     07-01  Mg     1.80     07-01    TPro  6.2  /  Alb  3.7  /  TBili  0.5  /  DBili  x   /  AST  20  /  ALT  11  /  AlkPhos  52  07-01    CAPILLARY BLOOD GLUCOSE      POCT Blood Glucose.: 100 mg/dL (01 Jul 2025 09:42)    LIVER FUNCTIONS - ( 01 Jul 2025 10:06 )  Alb: 3.7 g/dL / Pro: 6.2 g/dL / ALK PHOS: 52 U/L / ALT: 11 U/L / AST: 20 U/L / GGT: x             PT/INR - ( 01 Jul 2025 10:06 )   PT: 12.1 sec;   INR: 1.02 ratio         PTT - ( 01 Jul 2025 10:06 )  PTT:28.7 sec  CSF:                  Radiology:

## 2025-07-01 NOTE — PHYSICAL THERAPY INITIAL EVALUATION ADULT - NSPTDISCHREC_GEN_A_CORE
Discharge recommendation pending completion of functional mobility assessment; Please follow PT notes for updates

## 2025-07-01 NOTE — CHART NOTE - NSCHARTNOTEFT_GEN_A_CORE
85 y.o. male with PMH of CAD, h/o PCI with stent, A. Fib (on Eliquis), HF, AICD,  HTN, HLD, COPD presents today for elective R heart catheterization and CardioMEM implant, c/o R side numbness, unable to move RLE and decreased strength in R arm 15 -20 min upon arrival to [facility] post procedure.   The patient is A&O x4,  VSS  Blood glucose fingerstick 100  Heart - HR - reg, + S1, S2  Lungs + mild rhonchi b/l  Abdomen -  soft, non tender  RFV access - no tenderness, no hematoma, no bleeding, covered with dressing  Neuro - no facial asymmetry, decreased R arm strength, significantly decreased RLE strength and sensation.     Medical rapid response team called, code stroke called.      CT brain:  No hydrocephalus, acute intracranial hemorrhage, mass effect, or brain   edema.    CT brain perfusion:  Mildly limited by motion.  Cerebral blood flow less than 30% = 0 mL.  Tmax greater than 6 seconds = 32 mL and involves the bilateral posterior   temporal and parietal lobes. The distribution is somewhat watershed in   nature. Findings may be related to the presence of cardiac disease.  Mismatch volume: 32 mL  Mismatch ratio: Infinite    CTA brain:  No flow-limiting stenosis or vascular aneurysm. No AVM.  Venous system is well opacified, no evidence for venous sinus or cortical   vein thrombosis.    CTA neck:  No flow-limiting stenosis, evidence for acute arterial dissection, or   vascular aneurysm.      Decision was made by neurology team to give Tenecteplase and transfer the patient to MICU. The patient was stable the time of transfer.   Dr. Mejia was called and informed over the phone.

## 2025-07-01 NOTE — H&P CARDIOLOGY - VASCULAR
72 y/o m pmhx HTN, HLD, TATYANA, CKD III and DMII sent in for angiogram given LAUREN/CP found to have symptomatic anemia, now for EGD/cscope 7/8.     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 Ulcerated and excavated with visible vessel s/p clip placement  - s/p endoscopy / colonoscopy 7/8   - started on IV Venofer  - HH 7.6  - CT A/P with diverticulosis   - IV Protonix gtt  - CT: A 3.2 x 5.0 cm ulcerating gastric cardia/fundal mass. No evidence of metastatic disease in the chest, abdomen and pelvis  - OR with Dr. Stewart today for partial gastrectomy   - Medicine: pt is medically optimized for gastric resection  - GI following  - Surgery following  - Heme / onc following    # Dyspnea on exertion  - Suspect secondary to anemia. Less likely cardiac in nature given negative nuclear stress test. EKG nonischemic. PNA unlikely given lack of cough, fever, and PE signs. VTE also unlikely given lack of LE edema, tachycardia, and risk factors.  - CT: interstitial lung disease of undetermined etiology. 1.3 cm short axis subcarinal lymph node (series 3 image 84). Recommend follow-up chest CT in 3 months to determine stability.  - On RA, in no acute distress at this time  - pulm clearance for EGD/colonoscopy appreciated    # Dizziness  - Suspect secondary to anemia. Less likely cardiac in nature given negative nuclear stress test. Orthostatics negative. Exam nonfocal making stroke less likely.   - Anemia workup as above.  - Once workup complete, revisit need for ischemic workup with cardiology.     # Abnormal chest XRAY  - CT: interstitial lung disease of undetermined etiology. 1.3 cm short axis subcarinal lymph node (series 3 image 84).   Recommend follow-up chest CT in 3 months to determine stability.  - pulmonary evaluation appreciated. will follow outpatient.    # CKD (chronic kidney disease) stage 3, GFR 30-59 ml/min  - BUN/Cr ratio suggestive of chronic process. Unknown baseline.   - Scr 1.62-> 1.52-->1.65 --> 1.72 --> 1.7 -> 1.48  - hold hydralazine in setting of worsening Cr    # HTN (hypertension)  -  Continue home medications - labetalol, amlodipine    # Type 2 diabetes mellitus  - LSSI  - consistent carb / dash diet detailed exam

## 2025-07-01 NOTE — H&P CARDIOLOGY - NSICDXPASTMEDICALHX_GEN_ALL_CORE_FT
PAST MEDICAL HISTORY:  AF (atrial fibrillation)     AICD (automatic cardioverter/defibrillator) present     Aortic aneurysm, thoracic     CAD (coronary artery disease)     Chronic obstructive pulmonary disease, unspecified COPD type     GERD (gastroesophageal reflux disease)     HTN (hypertension)     Hypercholesterolemia     NICM (nonischemic cardiomyopathy)     Obesity     Osteoarthritis hip    Stented coronary artery PTCA x 1 stent -2013

## 2025-07-01 NOTE — ASU PATIENT PROFILE, ADULT - FALL HARM RISK - HARM RISK INTERVENTIONS
Assistance with ambulation/Assistance OOB with selected safe patient handling equipment/Communicate Risk of Fall with Harm to all staff/Discuss with provider need for PT consult/Monitor gait and stability/Reinforce activity limits and safety measures with patient and family/Tailored Fall Risk Interventions/Visual Cue: Yellow wristband and red socks/Bed in lowest position, wheels locked, appropriate side rails in place/Call bell, personal items and telephone in reach/Instruct patient to call for assistance before getting out of bed or chair/Non-slip footwear when patient is out of bed/[place] to call system/Physically safe environment - no spills, clutter or unnecessary equipment/Purposeful Proactive Rounding/Room/bathroom lighting operational, light cord in reach

## 2025-07-01 NOTE — PATIENT PROFILE ADULT - FALL HARM RISK - HARM RISK INTERVENTIONS

## 2025-07-01 NOTE — RAPID RESPONSE TEAM SUMMARY - NSSITUATIONBACKGROUNDRRT_GEN_ALL_CORE
85 y.o. male with PMH of CAD, h/o PCI with stent, A. Fib (on Eliquis), AICD,  HTN, HLD, COPD presents today for elective R heart catheterization and MEMs implant.  The patient c/o SOB with exertion started 15 years ago. The patient denies chest pain, palpitations, presyncope, syncope,  headache, visual disturbances, CVA, PE, DVT, ALEXANDRO, abdominal pain, N/V/D/C, hematochezia, melena, dysuria, hematuria, fever, chills. The patient was evaluated by a cardiologist. Due to patient's symptoms and abnormal non invasive findings, the patient  was recommended to have R cardiac catheterization and MEMs implant.  The patient denies any complaints at present.

## 2025-07-01 NOTE — H&P CARDIOLOGY - COMMENTS
Pre-sedation evaluation    Dentures: upper and lower  Last PO intake: 6/30/25 18:00    Level of consciousness: 1  Obstructive sleep apnea: No  Aspiration risk: No  Mallampati score: 2  ASA Classification: 2  Prior Sedative or Anesthesia Experience: No complications  Informed consent by responsible adult: Yes  Responsible adult escort: Yes  Based on today's assessment, anesthesia consult requested: No  Last dose of Eliquis was taken on 6/28/25  The patient has DNR/MOLST form filled, brought from home. He agrees to rescind DNR for today's procedure.   The patient was refusing blood transfusion, but after thorough explanation of risks and benefits, he agreed to have blood transfusion in case of an emergency.

## 2025-07-01 NOTE — H&P CARDIOLOGY - HISTORY OF PRESENT ILLNESS
85 y.o. male with PMH of CAD, h/o PCI with stent, A. Fib (on Eliquis), AICD,  HTN, HLD, COPD presents today for elective R heart catheterization and MEMs implant.  The patient c/o SOB with exertion started 15 years ago. The patient denies chest pain, palpitations, presyncope, syncope,  headache, visual disturbances, CVA, PE, DVT, ALEXANDRO, abdominal pain, N/V/D/C, hematochezia, melena, dysuria, hematuria, fever, chills. The patient was evaluated by a cardiologist. Due to patient's symptoms and abnormal non invasive findings, the patient  was recommended to have R cardiac catheterization and MEMs implant.  The patient denies any complaints at present.      85 y.o. male with PMH of CAD, h/o PCI with stent, A. Fib (on Eliquis), AICD,  HTN, HLD, COPD presents today for elective R heart catheterization and CardioMEM implant.  The patient c/o SOB with exertion started 15 years ago. The patient denies chest pain, palpitations, presyncope, syncope,  headache, visual disturbances, CVA, PE, DVT, ALEXANDRO, abdominal pain, N/V/D/C, hematochezia, melena, dysuria, hematuria, fever, chills. The patient was evaluated by a cardiologist. Due to patient's symptoms and abnormal non invasive findings, the patient  was recommended to have R cardiac catheterization and CardioMEM implant.  The patient denies any complaints at present.

## 2025-07-01 NOTE — PROVIDER CONTACT NOTE (CRITICAL VALUE NOTIFICATION) - SITUATION
patient c/o of numbness going from his toe to his shoulder on the right side. Informed KELLEY العراقي of patient complaint. after evaluation, medical rapid response was initiated. code stroked was then called after rapid team assessed patient. at 10:10am, stroke team worked up patient and escorted patient to CT.

## 2025-07-01 NOTE — OCCUPATIONAL THERAPY INITIAL EVALUATION ADULT - PRECAUTIONS/LIMITATIONS, REHAB EVAL
Bedrest orders for 12 hours s/p tenecteplase administration and 4 hours s/p cardiac procedure. Pt is to lay flat s/p procedure./fall precautions

## 2025-07-01 NOTE — H&P CARDIOLOGY - NSICDXPASTSURGICALHX_GEN_ALL_CORE_FT
PAST SURGICAL HISTORY:  AICD (automatic cardioverter/defibrillator) present     H/O bilateral hip replacements right 1999,  2003 left  revision right THR - 2010 and 2013    H/O colonoscopy 2014    H/O endoscopy 2017    H/o total knee replacement, bilateral right 1997, left 1999    History of PTCA 1 2013 x 1 stent    S/P cataract surgery b/l    S/P tonsillectomy 13 y/o

## 2025-07-01 NOTE — OCCUPATIONAL THERAPY INITIAL EVALUATION ADULT - MD ORDER
OT evaluate and treat   Bedrest for 4 hours s/p procedure and 12 hours s/p tenecteplase administration (then out of bed with assistance)  Patient to lay flat s/p procedure

## 2025-07-01 NOTE — OCCUPATIONAL THERAPY INITIAL EVALUATION ADULT - NSOTDISCHREC_GEN_A_CORE
Unable to make formal discharge recommendation at this time due to limited functional evaluation. Pt with strict bedrest orders at this time. OT to continue to follow.

## 2025-07-01 NOTE — OCCUPATIONAL THERAPY INITIAL EVALUATION ADULT - LEVEL OF INDEPENDENCE: SUPINE/SIT, REHAB EVAL
Unable to assess; Pt on strict bedrest orders 12 hours s/p tenecteplase administration and 4 hours s/p procedure. Per RN, Pt to remain laying flat s/p procedure at this time.

## 2025-07-01 NOTE — CHART NOTE - NSCHARTNOTEFT_GEN_A_CORE
MICU AcceptNote     -----Corby Hawthorne PGY-2-----    MICU consulted for:     HPI:  85 y.o. male with PMH of CAD, h/o PCI with stent, MORA Pittman (on Eliquis), AICD,  HTN, HLD, COPD presents today for elective R heart catheterization and CardioMEM implant.  The patient c/o SOB with exertion started 15 years ago. The patient denies chest pain, palpitations, presyncope, syncope,  headache, visual disturbances, CVA, PE, DVT, ALEXANDRO, abdominal pain, N/V/D/C, hematochezia, melena, dysuria, hematuria, fever, chills. The patient was evaluated by a cardiologist. Due to patient's symptoms and abnormal non invasive findings, the patient  was recommended to have R cardiac catheterization and CardioMEM implant.  The patient denies any complaints at present.      (01 Jul 2025 06:52)    PAST MEDICAL & SURGICAL HISTORY:  GERD (gastroesophageal reflux disease)  CAD (coronary artery disease)  Stented coronary artery  PTCA x 1 stent -2013  HTN (hypertension)  Obesity  Hypercholesterolemia  Osteoarthritis  hip  AF (atrial fibrillation)  Aortic aneurysm, thoracic  NICM (nonischemic cardiomyopathy)  Chronic obstructive pulmonary disease, unspecified COPD type  AICD (automatic cardioverter/defibrillator) present  History of PTCA 1  2013 x 1 stent  S/P tonsillectomy  11 y/o  H/O bilateral hip replacements  right 1999,  2003 left  revision right THR - 2010 and 2013      H/o total knee replacement, bilateral  right 1997, left 1999      S/P cataract surgery  b/l      H/O endoscopy  2017      H/O colonoscopy  2014      AICD (automatic cardioverter/defibrillator) present        FAMILY HISTORY:  Family history of myocardial infarction (Mother)      MEDICATIONS  (STANDING):    MEDICATIONS  (PRN):      REVIEW OF SYSTEMS:    OBJECTIVE:  ICU Vital Signs Last 24 Hrs  T(C): 36 (01 Jul 2025 18:00), Max: 37 (01 Jul 2025 08:40)  T(F): 96.8 (01 Jul 2025 18:00), Max: 98.6 (01 Jul 2025 08:40)  HR: 73 (01 Jul 2025 18:00) (55 - 82)  BP: 105/66 (01 Jul 2025 18:00) (79/48 - 138/91)  BP(mean): 70 (01 Jul 2025 16:30) (59 - 98)  ABP: --  ABP(mean): --  RR: 26 (01 Jul 2025 18:00) (14 - 28)  SpO2: 94% (01 Jul 2025 18:00) (88% - 100%)    O2 Parameters below as of 01 Jul 2025 18:00  Patient On (Oxygen Delivery Method): room air      PHYSICAL EXAM:  GENERAL: Well appearing, NAD  HEAD: Normocephalic  EYES: clear sclera, appropriate eye movements  NECK: No JCD  HEART: Regular R/R  LUNGS: CLear lung fields moves air well  ABDOMEN: Nontender non distended  EXTREMITIES: No LE edema   NEURO: AO3, 4/5 LUE 5/5 RUE. 3/5LLE 5/5 RLE/    PSYCH: appropriat affect  SKIN: clear skin  LINES: NA     LABS:                        14.3   10.57 )-----------( 212      ( 01 Jul 2025 10:06 )             42.6     Hgb Trend: 14.3<--, 14.7<--  07-01    138  |  104  |  16  ----------------------------<  101[H]  4.3   |  21[L]  |  1.07    Ca    9.6      01 Jul 2025 10:06  Phos  2.9     07-01  Mg     1.80     07-01    TPro  6.2  /  Alb  3.7  /  TBili  0.5  /  DBili  x   /  AST  20  /  ALT  11  /  AlkPhos  52  07-01    Creatinine Trend: 1.07<--, 1.06<--  PT/INR - ( 01 Jul 2025 10:06 )   PT: 12.1 sec;   INR: 1.02 ratio         PTT - ( 01 Jul 2025 10:06 )  PTT:28.7 sec  Urinalysis Basic - ( 01 Jul 2025 10:06 )    Color: x / Appearance: x / SG: x / pH: x  Gluc: 101 mg/dL / Ketone: x  / Bili: x / Urobili: x   Blood: x / Protein: x / Nitrite: x   Leuk Esterase: x / RBC: x / WBC x   Sq Epi: x / Non Sq Epi: x / Bacteria: x        Venous Blood Gas:  07-01 @ 08:10  --/--/--/--/67  VBG Lactate: --      ASSESSMENT  BEHZAD HUNTER is a 85y male with PMH *** in the hospital for  transferred to the MICU for q1 neuro checks s/p tenecteplase     =====Neurologic=====  Patient is AOx   - sedation:   - Titrate to RAAS -1-0    #TIA-Stroke event     =====Cardiovascular=====  #CAD    #CHF     =====Pulmonary=====    =====GI=====    =====Renal/=====  #Electrolytes  - mendez:    =====Infectious Disease=====    - Continue with Antibiotic (Pip tazo, meropenum, ertapenum, ceftriaxone etc)   - FU blood cultures   - Continue shock treatment as above     =====Endocrine=====  No concerns presently.     =====Heme/Onc=====  - DVT PPX: Heparin 5000 units subq   /  Lovenox 40mg  / SCDs   / Holding     =====MICUGeneral=====  Lines:    Drips:    IVF   O2:     AB   Diet:    Pain:   DVT: MICU AcceptNote     -----Corby Hawthorne PGY-2-----    MICU consulted for:     HPI:  85 y.o. male with PMH of CAD, h/o PCI with stent, MORA Pittman (on Eliquis), AICD,  HTN, HLD, COPD presents today for elective R heart catheterization and CardioMEM implant.  The patient c/o SOB with exertion started 15 years ago. The patient denies chest pain, palpitations, presyncope, syncope,  headache, visual disturbances, CVA, PE, DVT, ALEXANDRO, abdominal pain, N/V/D/C, hematochezia, melena, dysuria, hematuria, fever, chills. The patient was evaluated by a cardiologist. Due to patient's symptoms and abnormal non invasive findings, the patient  was recommended to have R cardiac catheterization and CardioMEM implant.  The patient denies any complaints at present.      (01 Jul 2025 06:52)    PAST MEDICAL & SURGICAL HISTORY:  GERD (gastroesophageal reflux disease)  CAD (coronary artery disease)  Stented coronary artery  PTCA x 1 stent -2013  HTN (hypertension)  Obesity  Hypercholesterolemia  Osteoarthritis  hip  AF (atrial fibrillation)  Aortic aneurysm, thoracic  NICM (nonischemic cardiomyopathy)  Chronic obstructive pulmonary disease, unspecified COPD type  AICD (automatic cardioverter/defibrillator) present  History of PTCA 1  2013 x 1 stent  S/P tonsillectomy  13 y/o  H/O bilateral hip replacements  right 1999,  2003 left  revision right THR - 2010 and 2013      H/o total knee replacement, bilateral  right 1997, left 1999      S/P cataract surgery  b/l      H/O endoscopy  2017      H/O colonoscopy  2014      AICD (automatic cardioverter/defibrillator) present        FAMILY HISTORY:  Family history of myocardial infarction (Mother)      MEDICATIONS  (STANDING):    MEDICATIONS  (PRN):      REVIEW OF SYSTEMS:    OBJECTIVE:  ICU Vital Signs Last 24 Hrs  T(C): 36 (01 Jul 2025 18:00), Max: 37 (01 Jul 2025 08:40)  T(F): 96.8 (01 Jul 2025 18:00), Max: 98.6 (01 Jul 2025 08:40)  HR: 73 (01 Jul 2025 18:00) (55 - 82)  BP: 105/66 (01 Jul 2025 18:00) (79/48 - 138/91)  BP(mean): 70 (01 Jul 2025 16:30) (59 - 98)  ABP: --  ABP(mean): --  RR: 26 (01 Jul 2025 18:00) (14 - 28)  SpO2: 94% (01 Jul 2025 18:00) (88% - 100%)    O2 Parameters below as of 01 Jul 2025 18:00  Patient On (Oxygen Delivery Method): room air      PHYSICAL EXAM:  GENERAL: Well appearing, NAD  HEAD: Normocephalic  EYES: clear sclera, appropriate eye movements  NECK: No JCD  HEART: Regular R/R  LUNGS: CLear lung fields moves air well  ABDOMEN: Nontender non distended  EXTREMITIES: No LE edema   NEURO: AO3, 4/5 LUE 5/5 RUE. 3/5LLE 5/5 RLE/    PSYCH: appropriat affect  SKIN: clear skin  LINES: NA     LABS:                        14.3   10.57 )-----------( 212      ( 01 Jul 2025 10:06 )             42.6     Hgb Trend: 14.3<--, 14.7<--  07-01    138  |  104  |  16  ----------------------------<  101[H]  4.3   |  21[L]  |  1.07    Ca    9.6      01 Jul 2025 10:06  Phos  2.9     07-01  Mg     1.80     07-01    TPro  6.2  /  Alb  3.7  /  TBili  0.5  /  DBili  x   /  AST  20  /  ALT  11  /  AlkPhos  52  07-01    Creatinine Trend: 1.07<--, 1.06<--  PT/INR - ( 01 Jul 2025 10:06 )   PT: 12.1 sec;   INR: 1.02 ratio         PTT - ( 01 Jul 2025 10:06 )  PTT:28.7 sec  Urinalysis Basic - ( 01 Jul 2025 10:06 )    Color: x / Appearance: x / SG: x / pH: x  Gluc: 101 mg/dL / Ketone: x  / Bili: x / Urobili: x   Blood: x / Protein: x / Nitrite: x   Leuk Esterase: x / RBC: x / WBC x   Sq Epi: x / Non Sq Epi: x / Bacteria: x        Venous Blood Gas:  07-01 @ 08:10  --/--/--/--/67  VBG Lactate: --      ASSESSMENT  BEHZAD HUNTER is a 85y male with PMH AFib (on Eliquis, last dose on 6/28/25), CAD with stents (on Plavix), Nonischemic cardiomyopathy with AICD, COPD, HTN, HLD, GERD, was in hospital for right Heart Catheterization with CardioMEMS placement, had subsequent stroke with LKW at 8:40AM. Was transferred to the MICU for q1 neuro checks s/p tenecteplase .    =====Neurologic=====  Patient is AOx 3  - sedation: none      #TIA-Stroke event   PLAN  -General stroke protocol (q1 neuro checks, /105, keep normothermic, keep euglycemic, with fall precautions and aspiration precautions)  - Repeat CT head non contrast 7/2 at 9am  - Keep NPO for 12 hours, can eat again at 10pm 7/1  - Order TTE with bubble study  -F/u A1c lipids  in AM  - PT/OT order    =====Cardiovascular=====  #CAD  - Can restart antiplatelet 24 hours after tenecteplase  - continue statin once patient can eat  - Titrate LDL to 80        #CHF     =====Pulmonary=====  -f/u VBG    =====GI=====    Diet  -Can start diet again at 10:00pm    =====Renal/=====  #Electrolytes      =====Infectious Disease=====  #Leukocytosis  -Likely reactive NTD    =====Endocrine=====  No concerns presently.     =====Heme/Onc=====  - Tenecteplase 25 mg  DVT PPX: Heparin 5000 units subq   /  Lovenox 40mg  / SCDs   / Holding     =====MICUGeneral=====  Lines:    Drips:    IVF   O2:     AB   Diet:    Pain:   DVT: MICU AcceptNote     -----Corby Hawthorne PGY-2-----    MICU consulted for:     HPI:  85 y.o. male with PMH of CAD, h/o PCI with stent, MORA Pittman (on Eliquis), AICD,  HTN, HLD, COPD presents today for elective R heart catheterization and CardioMEM implant.  The patient c/o SOB with exertion started 15 years ago. The patient denies chest pain, palpitations, presyncope, syncope,  headache, visual disturbances, CVA, PE, DVT, ALEXANDRO, abdominal pain, N/V/D/C, hematochezia, melena, dysuria, hematuria, fever, chills. The patient was evaluated by a cardiologist. Due to patient's symptoms and abnormal non invasive findings, the patient  was recommended to have R cardiac catheterization and CardioMEM implant.  The patient denies any complaints at present.      (01 Jul 2025 06:52)    PAST MEDICAL & SURGICAL HISTORY:  GERD (gastroesophageal reflux disease)  CAD (coronary artery disease)  Stented coronary artery  PTCA x 1 stent -2013  HTN (hypertension)  Obesity  Hypercholesterolemia  Osteoarthritis  hip  AF (atrial fibrillation)  Aortic aneurysm, thoracic  NICM (nonischemic cardiomyopathy)  Chronic obstructive pulmonary disease, unspecified COPD type  AICD (automatic cardioverter/defibrillator) present  History of PTCA 1  2013 x 1 stent  S/P tonsillectomy  11 y/o  H/O bilateral hip replacements  right 1999,  2003 left  revision right THR - 2010 and 2013      H/o total knee replacement, bilateral  right 1997, left 1999      S/P cataract surgery  b/l      H/O endoscopy  2017      H/O colonoscopy  2014      AICD (automatic cardioverter/defibrillator) present        FAMILY HISTORY:  Family history of myocardial infarction (Mother)      MEDICATIONS  (STANDING):    MEDICATIONS  (PRN):      REVIEW OF SYSTEMS:    OBJECTIVE:  ICU Vital Signs Last 24 Hrs  T(C): 36 (01 Jul 2025 18:00), Max: 37 (01 Jul 2025 08:40)  T(F): 96.8 (01 Jul 2025 18:00), Max: 98.6 (01 Jul 2025 08:40)  HR: 73 (01 Jul 2025 18:00) (55 - 82)  BP: 105/66 (01 Jul 2025 18:00) (79/48 - 138/91)  BP(mean): 70 (01 Jul 2025 16:30) (59 - 98)  ABP: --  ABP(mean): --  RR: 26 (01 Jul 2025 18:00) (14 - 28)  SpO2: 94% (01 Jul 2025 18:00) (88% - 100%)    O2 Parameters below as of 01 Jul 2025 18:00  Patient On (Oxygen Delivery Method): room air      PHYSICAL EXAM:  GENERAL: Well appearing, NAD  HEAD: Normocephalic  EYES: clear sclera, appropriate eye movements  NECK: No JCD  HEART: Regular R/R  LUNGS: CLear lung fields moves air well  ABDOMEN: Nontender non distended  EXTREMITIES: No LE edema   NEURO: AO3, 4/5 LUE 5/5 RUE. 3/5LLE 5/5 RLE/    PSYCH: appropriat affect  SKIN: clear skin  LINES: NA     LABS:                        14.3   10.57 )-----------( 212      ( 01 Jul 2025 10:06 )             42.6     Hgb Trend: 14.3<--, 14.7<--  07-01    138  |  104  |  16  ----------------------------<  101[H]  4.3   |  21[L]  |  1.07    Ca    9.6      01 Jul 2025 10:06  Phos  2.9     07-01  Mg     1.80     07-01    TPro  6.2  /  Alb  3.7  /  TBili  0.5  /  DBili  x   /  AST  20  /  ALT  11  /  AlkPhos  52  07-01    Creatinine Trend: 1.07<--, 1.06<--  PT/INR - ( 01 Jul 2025 10:06 )   PT: 12.1 sec;   INR: 1.02 ratio         PTT - ( 01 Jul 2025 10:06 )  PTT:28.7 sec  Urinalysis Basic - ( 01 Jul 2025 10:06 )    Color: x / Appearance: x / SG: x / pH: x  Gluc: 101 mg/dL / Ketone: x  / Bili: x / Urobili: x   Blood: x / Protein: x / Nitrite: x   Leuk Esterase: x / RBC: x / WBC x   Sq Epi: x / Non Sq Epi: x / Bacteria: x        Venous Blood Gas:  07-01 @ 08:10  --/--/--/--/67  VBG Lactate: --      ASSESSMENT  BEHZAD HUNTER is a 85y male with PMH AFib (on Eliquis, last dose on 6/28/25), CAD with stents (on Plavix), Nonischemic cardiomyopathy with AICD, COPD, HTN, HLD, GERD, was in hospital for right Heart Catheterization with CardioMEMS placement, had subsequent stroke with LKW at 8:40AM. Was transferred to the MICU for q1 neuro checks s/p tenecteplase .    =====Neurologic=====  Patient is AOx 3  - sedation: none      #TIA-Stroke event   PLAN  -General stroke protocol (q1 neuro checks, /105, keep normothermic, keep euglycemic, with fall precautions and aspiration precautions)  - Repeat CT head non contrast 7/2 at 9am  - Keep NPO for 12 hours, can eat again at 10pm 7/1  - Order TTE with bubble study  -F/u A1c lipids  in AM  - PT/OT order    =====Cardiovascular=====  #CAD  - Can restart antiplatelet 24 hours after tenecteplase  - continue statin once patient can eat  - Titrate LDL to 80        #CHF     =====Pulmonary=====  -f/u VBG    =====GI=====    Diet  -Can start diet again at 10:00pm    =====Renal/=====  #Electrolytes      =====Infectious Disease=====  #Leukocytosis  -Likely reactive NTD    =====Endocrine=====  No concerns presently.     =====Heme/Onc=====  - Tenecteplase 25 mg  DVT PPX: Heparin 5000 units subq   /  Lovenox 40mg  / SCDs   / Holding     =====MICUGeneral=====  Lines:    Drips:    IVF   O2:     AB   Diet:    Pain:   DVT:      Attending Attestation    I have personally seen and examined the patient.  I fully participated in the care of this patient.  I have made amendments to the documentation where necessary, and agree with the history, physical exam, and plan as documented by the Resident.     Patient is critically ill, requiring critical care services.     Attending: I have personally and independently provided 45 minutes of critical care services.  This excludes any time spent on separate procedures or teaching.     85 y.o. male with PMH of CAD, h/o PCI with stent, A. Fib (on Eliquis), AICD,  HTN, HLD, COPD who presented for a RHC and Cardiomems placement. Patient s/p RHC, but post with right sided weakness, RLL> RUE. Code stroke s/p TNK 1040. CT scan without findings. RUE now improved but admitted to MICU for POST TNK care.     # CVA  # CHF  # COPD  - Post cath with right seded deficits s/p TNK.   - Pending repeat CTH at 24 hours, MRI ordered. and CTH PRN for clinical changes.   - TTE with bubble study  - Check A1c, Lipids in the AM  - Start high intensity statins once able to take PO  - Per Neuro if repeat CT in 24 hours is negative for bleed can start home antiplatelets.   - PT/OT  - Passed dysphagia screening.   - Permissive hypertension, Antihypertensive as needed, BP goal from neuro noted.   - Monitor for post TNK bleeding. angioedema  - Neuro check as ordered based on Stroke orderset.   - f/u with neuro recs.   - Duonebs PRN< advair.   - Monitor for bleeding especially at the post cath site  - DVT ppx- SCD  - Dispo- Is DNR/DNI per patient. MICU AcceptNote     -----Corby Hawthorne PGY-3-----    MICU consulted for: Q1 Neurochecks s/p TPA iso TIA     HPI:  85-year-old male with a history of CAD (s/p PCI with stent placement), chronic HFrEF, atrial fibrillation (on Eliquis), AICD, hypertension, hyperlipidemia, and COPD presented for elective right heart catheterization and CardioMEMS implantation c/b right sided hemiplegia c/f stroke s/p TPA.     Patient reports progressive exertional dyspnea over the past 15 years. He denies chest pain, palpitations, presyncope, syncope, headache, visual disturbances, CVA, PE, DVT, ALEXANDRO, abdominal pain, nausea, vomiting, diarrhea, constipation, hematochezia, melena, dysuria, hematuria, fever, or chills. He was evaluated by cardiology and, given persistent symptoms and abnormal non-invasive findings, was referred for invasive hemodynamic evaluation and device implantation.    Following the right heart catheterization, the patient developed acute right-sided hemiplegia, prompting a rapid response and subsequent stroke code activation. His last known normal was around 8:00 AM, and he was administered tPA at 10:30 AM. He reported that aside from difficulty moving, he did not feel any subjective changes. He mentioned a possible prior stroke diagnosis during a previous stent procedure, although details were unclear. The cardiac catheterization was performed for evaluation of cardiac function in the setting of chronic heart failure.        (01 Jul 2025 06:52)    PAST MEDICAL & SURGICAL HISTORY:  GERD (gastroesophageal reflux disease)  CAD (coronary artery disease)  Stented coronary artery  PTCA x 1 stent -2013  HTN (hypertension)  Obesity  Hypercholesterolemia  Osteoarthritis  hip  AF (atrial fibrillation)  Aortic aneurysm, thoracic  NICM (nonischemic cardiomyopathy)  Chronic obstructive pulmonary disease, unspecified COPD type  AICD (automatic cardioverter/defibrillator) present  History of PTCA 1  2013 x 1 stent  S/P tonsillectomy  11 y/o  H/O bilateral hip replacements  right 1999,  2003 left  revision right THR - 2010 and 2013  H/o total knee replacement, bilateral  right 1997, left 1999  S/P cataract surgery  b/l  H/O endoscopy  2017  H/O colonoscopy  2014  AICD (automatic cardioverter/defibrillator) present    FAMILY HISTORY:  Family history of myocardial infarction (Mother)          REVIEW OF SYSTEMS: They denied fevers/chills, shortness of breath/chest pain, abdomin pain, constipation/diarrehea, any issues with urination.      OBJECTIVE:  ICU Vital Signs Last 24 Hrs  T(C): 36 (01 Jul 2025 18:00), Max: 37 (01 Jul 2025 08:40)  T(F): 96.8 (01 Jul 2025 18:00), Max: 98.6 (01 Jul 2025 08:40)  HR: 73 (01 Jul 2025 18:00) (55 - 82)  BP: 105/66 (01 Jul 2025 18:00) (79/48 - 138/91)  BP(mean): 70 (01 Jul 2025 16:30) (59 - 98)  ABP: --  ABP(mean): --  RR: 26 (01 Jul 2025 18:00) (14 - 28)  SpO2: 94% (01 Jul 2025 18:00) (88% - 100%)    O2 Parameters below as of 01 Jul 2025 18:00  Patient On (Oxygen Delivery Method): room air      PHYSICAL EXAM:  GENERAL: Well appearing, NAD  HEAD: Normocephalic  EYES: clear sclera, appropriate eye movements  NECK: No JCD  HEART: Regular R/R  LUNGS: CLear lung fields moves air well  ABDOMEN: Nontender non distended  EXTREMITIES: No LE edema   NEURO: AO3, 4/5 LUE 5/5 RUE. 3/5LLE 5/5 RLE/    PSYCH: appropriat affect  SKIN: clear skin  LINES: NA     LABS:                        14.3   10.57 )-----------( 212      ( 01 Jul 2025 10:06 )             42.6     Hgb Trend: 14.3<--, 14.7<--  07-01    138  |  104  |  16  ----------------------------<  101[H]  4.3   |  21[L]  |  1.07    Ca    9.6      01 Jul 2025 10:06  Phos  2.9     07-01  Mg     1.80     07-01    TPro  6.2  /  Alb  3.7  /  TBili  0.5  /  DBili  x   /  AST  20  /  ALT  11  /  AlkPhos  52  07-01    Creatinine Trend: 1.07<--, 1.06<--  PT/INR - ( 01 Jul 2025 10:06 )   PT: 12.1 sec;   INR: 1.02 ratio         PTT - ( 01 Jul 2025 10:06 )  PTT:28.7 sec  Urinalysis Basic - ( 01 Jul 2025 10:06 )    Color: x / Appearance: x / SG: x / pH: x  Gluc: 101 mg/dL / Ketone: x  / Bili: x / Urobili: x   Blood: x / Protein: x / Nitrite: x   Leuk Esterase: x / RBC: x / WBC x   Sq Epi: x / Non Sq Epi: x / Bacteria: x        Venous Blood Gas:  07-01 @ 08:10  --/--/--/--/67  VBG Lactate: --      ASSESSMENT  BEHZAD HUNTER is a 85y male with PMH AFib (on Eliquis, last dose on 6/28/25), CAD with stents (on Plavix), Nonischemic cardiomyopathy with AICD, COPD, HTN, HLD, GERD, was in hospital for right Heart Catheterization with CardioMEMS placement, had subsequent stroke with LKW at 8:40AM. Was transferred to the MICU for q1 neuro checks s/p tenecteplase .    =====Neurologic=====  Patient is AOx 3    # TIA/Stroke-like Event  Patient experienced a stroke-like event on July 1st at 10:30 AM, presenting with right-sided hemiplegia (L > R) and reduced sensation in the same distribution. Received tPA. Initial CT Head (stroke protocol) showed no acute findings. Differential includes acute embolic event, cardioembolic stroke (e.g., atrial fibrillation–associated emboli), or post-catheterization embolic event.    Plan:  - General stroke protocol: q1hr neuro checks, BP goal =180/105, keep normothermic, euglycemic, fall precautions, aspiration precautions  - Repeat CT Head (non-contrast) on 7/2 at 9:00 AM  -NPO for 12 hours post-tPA; resume diet at 10:00 PM on 7/1  - Order TTE with bubble study  ---- Check HbA1c and fasting lipid panel in AM  ---- Initiate PT/OT evaluation and therapy    =====Cardiovascular=====  #CAD  Prior history of stent in 2013. Takes   - Can restart antiplatelet 24 hours after tenecteplase  - continue statin once patient can eat  - Titrate LDL to 80    # Mobitz-HB (Wenckebach)  EKG after the neurological event was noted for new Mobitz I (Wenckebach) heart block. Per prior EKGs, the patient has had a Type I AV block in addition to atrial fibrillation. Discussed with cardiology — no acute concerns at this time.  -Plan for event recorder on discharge  -Continue to monitor patient’s telemetry tracing    #AFIB  Home medication: Apixaban 5mg BID, Metoprolol   - Will FU w/neuro about when to start Eliquis  - IF pressures stable can consider starting metoprolol.   - FU TTE order for PFO (prior echos were negative)     #CHF   Admission was for right heart catheterization and CardioMEMS implantation due to worsening shortness of breath in the setting of heart failure. RHC was fairly unremarkable, showing appropriate filling pressures and preserved cardiac output.    RHC findings:  RA: 4 mmHg  RV: 32/4 mmHg  PA: 31/12 (mean 20) mmHg  PCWP: 12 mmHg  CO/CI: 4.8 L/min / 2.0 L/min/m²    TTE (May 2025): Severely reduced LVEF, ascending aortic dilation, mild aortic regurgitation  - Will follow up with cardiology regarding GDMT (spironolactone, metoprolol)  - Monitor daily weights, continue general heart failure care    =====Pulmonary=====  #COPD     =====GI=====  No acute GI concerns at this time   -Can start diet again at 10:00pm    =====Renal/=====  No acute renal concerns   - no contrast used during RHC       =====Infectious Disease=====  #Leukocytosis  -Likely reactive NTD    =====Endocrine=====  No concerns presently.     =====Heme/Onc=====  - Tenecteplase 25 mg  DVT PPX:  Holding in setting of tenecteplase       Attending Attestation    I have personally seen and examined the patient.  I fully participated in the care of this patient.  I have made amendments to the documentation where necessary, and agree with the history, physical exam, and plan as documented by the Resident.     Patient is critically ill, requiring critical care services.     Attending: I have personally and independently provided 45 minutes of critical care services.  This excludes any time spent on separate procedures or teaching.     85 y.o. male with PMH of CAD, h/o PCI with stent, A. Fib (on Eliquis), AICD,  HTN, HLD, COPD who presented for a RHC and Cardiomems placement. Patient s/p RHC, but post with right sided weakness, RLL> RUE. Code stroke s/p TNK 1040. CT scan without findings. RUE now improved but admitted to MICU for POST TNK care.     # CVA  # CHF  # COPD  - Post cath with right seded deficits s/p TNK.   - Pending repeat CTH at 24 hours, MRI ordered. and CTH PRN for clinical changes.   - TTE with bubble study  - Check A1c, Lipids in the AM  - Start high intensity statins once able to take PO  - Per Neuro if repeat CT in 24 hours is negative for bleed can start home antiplatelets.   - PT/OT  - Passed dysphagia screening.   - Permissive hypertension, Antihypertensive as needed, BP goal from neuro noted.   - Monitor for post TNK bleeding. angioedema  - Neuro check as ordered based on Stroke orderset.   - f/u with neuro recs.   - Duonebs PRN< advair.   - Monitor for bleeding especially at the post cath site  - DVT ppx- SCD  - Dispo- Is DNR/DNI per patient. MICU AcceptNote     -----Corby Hawthorne PGY-3-----    MICU consulted for: Q1 Neurochecks s/p TPA iso TIA     HPI:  85-year-old male with a history of CAD (s/p PCI with stent placement), chronic HFrEF, atrial fibrillation (on Eliquis), AICD, hypertension, hyperlipidemia, and COPD presented for elective right heart catheterization and CardioMEMS implantation c/b right sided hemiplegia c/f stroke s/p TPA.     Patient reports progressive exertional dyspnea over the past 15 years. He denies chest pain, palpitations, presyncope, syncope, headache, visual disturbances, CVA, PE, DVT, ALEXANDRO, abdominal pain, nausea, vomiting, diarrhea, constipation, hematochezia, melena, dysuria, hematuria, fever, or chills. He was evaluated by cardiology and, given persistent symptoms and abnormal non-invasive findings, was referred for invasive hemodynamic evaluation and device implantation.    Following the right heart catheterization, the patient developed acute right-sided hemiplegia, prompting a rapid response and subsequent stroke code activation. His last known normal was around 8:00 AM, and he was administered tPA at 10:30 AM. He reported that aside from difficulty moving, he did not feel any subjective changes. He mentioned a possible prior stroke diagnosis during a previous stent procedure, although details were unclear. The cardiac catheterization was performed for evaluation of cardiac function in the setting of chronic heart failure.        (01 Jul 2025 06:52)    PAST MEDICAL & SURGICAL HISTORY:  GERD (gastroesophageal reflux disease)  CAD (coronary artery disease)  Stented coronary artery  PTCA x 1 stent -2013  HTN (hypertension)  Obesity  Hypercholesterolemia  Osteoarthritis  hip  AF (atrial fibrillation)  Aortic aneurysm, thoracic  NICM (nonischemic cardiomyopathy)  Chronic obstructive pulmonary disease, unspecified COPD type  AICD (automatic cardioverter/defibrillator) present  History of PTCA 1  2013 x 1 stent  S/P tonsillectomy  11 y/o  H/O bilateral hip replacements  right 1999,  2003 left  revision right THR - 2010 and 2013  H/o total knee replacement, bilateral  right 1997, left 1999  S/P cataract surgery  b/l  H/O endoscopy  2017  H/O colonoscopy  2014  AICD (automatic cardioverter/defibrillator) present    FAMILY HISTORY:  Family history of myocardial infarction (Mother)          REVIEW OF SYSTEMS: They denied fevers/chills, shortness of breath/chest pain, abdomin pain, constipation/diarrehea, any issues with urination.      OBJECTIVE:  ICU Vital Signs Last 24 Hrs  T(C): 36 (01 Jul 2025 18:00), Max: 37 (01 Jul 2025 08:40)  T(F): 96.8 (01 Jul 2025 18:00), Max: 98.6 (01 Jul 2025 08:40)  HR: 73 (01 Jul 2025 18:00) (55 - 82)  BP: 105/66 (01 Jul 2025 18:00) (79/48 - 138/91)  BP(mean): 70 (01 Jul 2025 16:30) (59 - 98)  ABP: --  ABP(mean): --  RR: 26 (01 Jul 2025 18:00) (14 - 28)  SpO2: 94% (01 Jul 2025 18:00) (88% - 100%)    O2 Parameters below as of 01 Jul 2025 18:00  Patient On (Oxygen Delivery Method): room air      PHYSICAL EXAM:  GENERAL: Well appearing, NAD  HEAD: Normocephalic  EYES: clear sclera, appropriate eye movements  NECK: No JCD  HEART: Regular R/R  LUNGS: CLear lung fields moves air well  ABDOMEN: Nontender non distended  EXTREMITIES: No LE edema   NEURO: AO3, 4/5 LUE 5/5 RUE. 3/5LLE 5/5 RLE/    PSYCH: appropriat affect  SKIN: clear skin  LINES: NA     LABS:                        14.3   10.57 )-----------( 212      ( 01 Jul 2025 10:06 )             42.6     Hgb Trend: 14.3<--, 14.7<--  07-01    138  |  104  |  16  ----------------------------<  101[H]  4.3   |  21[L]  |  1.07    Ca    9.6      01 Jul 2025 10:06  Phos  2.9     07-01  Mg     1.80     07-01    TPro  6.2  /  Alb  3.7  /  TBili  0.5  /  DBili  x   /  AST  20  /  ALT  11  /  AlkPhos  52  07-01    Creatinine Trend: 1.07<--, 1.06<--  PT/INR - ( 01 Jul 2025 10:06 )   PT: 12.1 sec;   INR: 1.02 ratio         PTT - ( 01 Jul 2025 10:06 )  PTT:28.7 sec  Urinalysis Basic - ( 01 Jul 2025 10:06 )    Color: x / Appearance: x / SG: x / pH: x  Gluc: 101 mg/dL / Ketone: x  / Bili: x / Urobili: x   Blood: x / Protein: x / Nitrite: x   Leuk Esterase: x / RBC: x / WBC x   Sq Epi: x / Non Sq Epi: x / Bacteria: x        Venous Blood Gas:  07-01 @ 08:10  --/--/--/--/67  VBG Lactate: --      ASSESSMENT  BEHZAD HUNTER is a 85y male with PMH AFib (on Eliquis, last dose on 6/28/25), CAD with stents (on Plavix), Nonischemic cardiomyopathy with AICD, COPD, HTN, HLD, GERD, was in hospital for right Heart Catheterization with CardioMEMS placement, had subsequent stroke with LKW at 8:40AM. Was transferred to the MICU for q1 neuro checks s/p tenecteplase .    =====Neurologic=====  Patient is AOx 3    # TIA/Stroke-like Event  Patient experienced a stroke-like event on July 1st at 10:30 AM, presenting with right-sided hemiplegia (L > R) and reduced sensation in the same distribution. Received tPA. Initial CT Head (stroke protocol) showed no acute findings. Differential includes acute embolic event, cardioembolic stroke (e.g., atrial fibrillation–associated emboli), or post-catheterization embolic event.    Plan:  - General stroke protocol: q1hr neuro checks, BP goal =180/105, keep normothermic, euglycemic, fall precautions, aspiration precautions  - Repeat CT Head (non-contrast) on 7/2 at 9:00 AM  -NPO for 12 hours post-tPA; resume diet at 10:00 PM on 7/1  - Order TTE with bubble study  ---- Check HbA1c and fasting lipid panel in AM  ---- Initiate PT/OT evaluation and therapy    =====Cardiovascular=====  #CAD  Prior history of stent in 2013. Takes   - Can restart antiplatelet 24 hours after tenecteplase  - continue statin once patient can eat  - Titrate LDL to 80    # Mobitz-HB (Wenckebach)  EKG after the neurological event was noted for new Mobitz I (Wenckebach) heart block. Per prior EKGs, the patient has had a Type I AV block in addition to atrial fibrillation. Discussed with cardiology — no acute concerns at this time.  -Plan for event recorder on discharge  -Continue to monitor patient’s telemetry tracing    #AFIB  Home medication: Apixaban 5mg BID, Metoprolol   - Will FU w/neuro about when to start Eliquis  - IF pressures stable can consider starting metoprolol.   - FU TTE order for PFO (prior echos were negative)     #CHF   Admission was for right heart catheterization and CardioMEMS implantation due to worsening shortness of breath in the setting of heart failure. RHC was fairly unremarkable, showing appropriate filling pressures and preserved cardiac output.    RHC findings:  RA: 4 mmHg  RV: 32/4 mmHg  PA: 31/12 (mean 20) mmHg  PCWP: 12 mmHg  CO/CI: 4.8 L/min / 2.0 L/min/m²    TTE (May 2025): Severely reduced LVEF, ascending aortic dilation, mild aortic regurgitation  - Will follow up with cardiology regarding GDMT (spironolactone, metoprolol)  - Monitor daily weights, continue general heart failure care    =====Pulmonary=====  #COPD   Patient takes inhalers at home, has significant COPD. No signs at the moment for acute COPD exacerbation  - Advair BID + Duonebs     =====GI=====  No acute GI concerns at this time   -Can start diet again at 10:00pm    =====Renal/=====  No acute renal concerns   - no contrast used during RHC     =====Infectious Disease=====  #Leukocytosis  -Likely reactive NTD    =====Endocrine=====  No concerns presently.     =====Heme/Onc=====  - Tenecteplase 25 mg  DVT PPX:  Holding in setting of tenecteplase       Attending Attestation    I have personally seen and examined the patient.  I fully participated in the care of this patient.  I have made amendments to the documentation where necessary, and agree with the history, physical exam, and plan as documented by the Resident.     Patient is critically ill, requiring critical care services.     Attending: I have personally and independently provided 45 minutes of critical care services.  This excludes any time spent on separate procedures or teaching.     85 y.o. male with PMH of CAD, h/o PCI with stent, A. Fib (on Eliquis), AICD,  HTN, HLD, COPD who presented for a RHC and Cardiomems placement. Patient s/p RHC, but post with right sided weakness, RLL> RUE. Code stroke s/p TNK 1040. CT scan without findings. RUE now improved but admitted to MICU for POST TNK care.     # CVA  # CHF  # COPD  - Post cath with right seded deficits s/p TNK.   - Pending repeat CTH at 24 hours, MRI ordered. and CTH PRN for clinical changes.   - TTE with bubble study  - Check A1c, Lipids in the AM  - Start high intensity statins once able to take PO  - Per Neuro if repeat CT in 24 hours is negative for bleed can start home antiplatelets.   - PT/OT  - Passed dysphagia screening.   - Permissive hypertension, Antihypertensive as needed, BP goal from neuro noted.   - Monitor for post TNK bleeding. angioedema  - Neuro check as ordered based on Stroke orderset.   - f/u with neuro recs.   - Duonebs PRN< advair.   - Monitor for bleeding especially at the post cath site  - DVT ppx- SCD  - Dispo- Is DNR/DNI per patient.

## 2025-07-01 NOTE — ASU DISCHARGE PLAN (ADULT/PEDIATRIC) - CARE PROVIDER_API CALL
Lei Cassidy  Cardiovascular Disease  [address] [place] [address]  Phone: [phone]  Fax: [phone]  Follow Up Time:

## 2025-07-01 NOTE — OCCUPATIONAL THERAPY INITIAL EVALUATION ADULT - BED MOBILITY/TRANSFERS, PREVIOUS LEVEL OF FUNCTION, OT EVAL
Pt reports "sometimes" using a cane for ambulation on days he felt weak but reports "mostly" ambulating independently without a device./independent/needs device

## 2025-07-01 NOTE — OCCUPATIONAL THERAPY INITIAL EVALUATION ADULT - PERTINENT HX OF CURRENT PROBLEM, REHAB EVAL
85 year old male presented to hospital for Right Heart Catheterization with CardioMEMS placement (Right femoral vein puncture site) on 7/1/2025. After procedure (7/1/2025), Pt developed Right sided weakness and numbness CT Brain/ CT Angio Brain/ CT Angio Neck/ CT Perfusion unrevealing. Due to high suspicion for a disabling acute ischemic stroke, Pt received Tenecteplase at 10:40 AM on 7/1/2025. Patient to be admitted for post tenecteplase monitoring and undergo further stroke workup.

## 2025-07-01 NOTE — ASU DISCHARGE PLAN (ADULT/PEDIATRIC) - ASU DC SPECIAL INSTRUCTIONSFT
Your procedure was performed through the [facility],  For the next 5 days:  - Limit climbing stairs.  - Do not soak in a bathtub or pool.  - Avoid strenuous activities (pushing, pulling, straining).  - Do not lift anything more than 10 pounds.    MEDICATION:   - Take your medications as explained (see attached medication reconciliation on discharge paperwork).    ADDITIONAL INSTRUCTIONS:  - Follow a heart healthy diet recommended by your doctor.   - If you smoke, we encourage smoking cessation. You may call [phone] for center of tobacco control if you need assistance.  - Call your doctor to make appointment within 2 weeks.    ***CALL YOUR DOCTOR***  - If you experience fever, chills, body aches, or severe pain, swelling, redness, heat, or yellow discharge from your incision site.  - If you notice bleeding or significant swelling at the procedural site.  - If you experience chest pain.  - If you experience extremity numbness, tingling, or temperature change.    If you are unable to get in contact with your doctor, you may contact the [facility] at [phone].  Please reference your discharge instructions for any questions or concerns.

## 2025-07-01 NOTE — RAPID RESPONSE TEAM SUMMARY - NSADDTLFINDINGSRRT_GEN_ALL_CORE
RRT called for acute onset R arm and leg weakness. Medically stable. No evidence of neurovascular compromise and RFV access site. Palpable pulses in b/l DP. Called code stroke. See code stroke note for further details RRT called for acute onset R arm and leg weakness. Medically stable. No evidence of neurovascular compromise at RFV access site. Palpable pulses in b/l DP. Called code stroke. See code stroke note for further details

## 2025-07-01 NOTE — PATIENT PROFILE ADULT - NSPROPTRIGHTBILLOFRIGHTS_GEN_A_NUR
Patient returned from ct. Tolerated well. Family at bedside. Updated on plan of care.       Daniele Faria RN  06/25/20 1037 patient

## 2025-07-01 NOTE — OCCUPATIONAL THERAPY INITIAL EVALUATION ADULT - DIAGNOSIS, OT EVAL
Pt with impaired strength in right hand, RLE, and impaired sensation impacting ability to complete ADLs, IADLs, functional mobility/transfers.

## 2025-07-01 NOTE — ASU DISCHARGE PLAN (ADULT/PEDIATRIC) - FINANCIAL ASSISTANCE
[facility] provides services at a reduced cost to those who are determined to be eligible through [facility]’s financial assistance program. Information regarding [facility]’s financial assistance program can be found by going to [email or web address] or by calling [phone].

## 2025-07-01 NOTE — PATIENT PROFILE ADULT - NSPROPTRIGHTCAREGIVER_GEN_A_NUR
Vitamin D is lower  Patient to take vitamin D 3, 4000 units daily and consistently  Liver test is elevated  This can be from covid infection  Patient to come in for liver US to see how the liver looks  Repeat hepatic panel in 1 month  continue efforts to lose weight, avoid fried fatty foods, avoid processed foods, alcohol use  All other labs are normal   no

## 2025-07-02 LAB
A1C WITH ESTIMATED AVERAGE GLUCOSE RESULT: 5.7 % — HIGH (ref 4–5.6)
ALBUMIN SERPL ELPH-MCNC: 3.3 G/DL — SIGNIFICANT CHANGE UP (ref 3.3–5)
ALP SERPL-CCNC: 48 U/L — SIGNIFICANT CHANGE UP (ref 40–120)
ALT FLD-CCNC: 11 U/L — SIGNIFICANT CHANGE UP (ref 4–41)
ANION GAP SERPL CALC-SCNC: 12 MMOL/L — SIGNIFICANT CHANGE UP (ref 7–14)
APTT BLD: 28.1 SEC — SIGNIFICANT CHANGE UP (ref 26.1–36.8)
AST SERPL-CCNC: 17 U/L — SIGNIFICANT CHANGE UP (ref 4–40)
BILIRUB SERPL-MCNC: 0.5 MG/DL — SIGNIFICANT CHANGE UP (ref 0.2–1.2)
BLOOD GAS VENOUS COMPREHENSIVE RESULT: SIGNIFICANT CHANGE UP
BUN SERPL-MCNC: 15 MG/DL — SIGNIFICANT CHANGE UP (ref 7–23)
CALCIUM SERPL-MCNC: 9.3 MG/DL — SIGNIFICANT CHANGE UP (ref 8.4–10.5)
CHLORIDE SERPL-SCNC: 104 MMOL/L — SIGNIFICANT CHANGE UP (ref 98–107)
CHOLEST SERPL-MCNC: 148 MG/DL — SIGNIFICANT CHANGE UP
CO2 SERPL-SCNC: 23 MMOL/L — SIGNIFICANT CHANGE UP (ref 22–31)
CREAT SERPL-MCNC: 1.02 MG/DL — SIGNIFICANT CHANGE UP (ref 0.5–1.3)
EGFR: 72 ML/MIN/1.73M2 — SIGNIFICANT CHANGE UP
EGFR: 72 ML/MIN/1.73M2 — SIGNIFICANT CHANGE UP
ESTIMATED AVERAGE GLUCOSE: 117 — SIGNIFICANT CHANGE UP
GLUCOSE SERPL-MCNC: 132 MG/DL — HIGH (ref 70–99)
HCT VFR BLD CALC: 40.9 % — SIGNIFICANT CHANGE UP (ref 39–50)
HDLC SERPL-MCNC: 43 MG/DL — SIGNIFICANT CHANGE UP
HGB BLD-MCNC: 13.7 G/DL — SIGNIFICANT CHANGE UP (ref 13–17)
INR BLD: 1.05 RATIO — SIGNIFICANT CHANGE UP (ref 0.85–1.16)
LDLC SERPL-MCNC: 89 MG/DL — SIGNIFICANT CHANGE UP
LIPID PNL WITH DIRECT LDL SERPL: 89 MG/DL — SIGNIFICANT CHANGE UP
MAGNESIUM SERPL-MCNC: 1.9 MG/DL — SIGNIFICANT CHANGE UP (ref 1.6–2.6)
MCHC RBC-ENTMCNC: 33.3 PG — SIGNIFICANT CHANGE UP (ref 27–34)
MCHC RBC-ENTMCNC: 33.5 G/DL — SIGNIFICANT CHANGE UP (ref 32–36)
MCV RBC AUTO: 99.3 FL — SIGNIFICANT CHANGE UP (ref 80–100)
NONHDLC SERPL-MCNC: 105 MG/DL — SIGNIFICANT CHANGE UP
NRBC # BLD AUTO: 0 K/UL — SIGNIFICANT CHANGE UP (ref 0–0)
NRBC # FLD: 0 K/UL — SIGNIFICANT CHANGE UP (ref 0–0)
NRBC BLD AUTO-RTO: 0 /100 WBCS — SIGNIFICANT CHANGE UP (ref 0–0)
PHOSPHATE SERPL-MCNC: 3.1 MG/DL — SIGNIFICANT CHANGE UP (ref 2.5–4.5)
PLATELET # BLD AUTO: 218 K/UL — SIGNIFICANT CHANGE UP (ref 150–400)
PMV BLD: 9.6 FL — SIGNIFICANT CHANGE UP (ref 7–13)
POTASSIUM SERPL-MCNC: 3.8 MMOL/L — SIGNIFICANT CHANGE UP (ref 3.5–5.3)
POTASSIUM SERPL-SCNC: 3.8 MMOL/L — SIGNIFICANT CHANGE UP (ref 3.5–5.3)
PROT SERPL-MCNC: 5.7 G/DL — LOW (ref 6–8.3)
PROTHROM AB SERPL-ACNC: 12.5 SEC — SIGNIFICANT CHANGE UP (ref 9.9–13.4)
RBC # BLD: 4.12 M/UL — LOW (ref 4.2–5.8)
RBC # FLD: 13.2 % — SIGNIFICANT CHANGE UP (ref 10.3–14.5)
SODIUM SERPL-SCNC: 139 MMOL/L — SIGNIFICANT CHANGE UP (ref 135–145)
TRIGL SERPL-MCNC: 86 MG/DL — SIGNIFICANT CHANGE UP
WBC # BLD: 9.47 K/UL — SIGNIFICANT CHANGE UP (ref 3.8–10.5)
WBC # FLD AUTO: 9.47 K/UL — SIGNIFICANT CHANGE UP (ref 3.8–10.5)

## 2025-07-02 PROCEDURE — 70450 CT HEAD/BRAIN W/O DYE: CPT | Mod: 26

## 2025-07-02 PROCEDURE — 99233 SBSQ HOSP IP/OBS HIGH 50: CPT

## 2025-07-02 PROCEDURE — 99233 SBSQ HOSP IP/OBS HIGH 50: CPT | Mod: GC

## 2025-07-02 RX ORDER — CLOPIDOGREL BISULFATE 75 MG/1
75 TABLET, FILM COATED ORAL DAILY
Refills: 0 | Status: DISCONTINUED | OUTPATIENT
Start: 2025-07-02 | End: 2025-07-03

## 2025-07-02 RX ORDER — FUROSEMIDE 10 MG/ML
40 INJECTION INTRAMUSCULAR; INTRAVENOUS DAILY
Refills: 0 | Status: DISCONTINUED | OUTPATIENT
Start: 2025-07-02 | End: 2025-07-02

## 2025-07-02 RX ORDER — BENZONATATE 100 MG
100 CAPSULE ORAL THREE TIMES A DAY
Refills: 0 | Status: DISCONTINUED | OUTPATIENT
Start: 2025-07-02 | End: 2025-07-03

## 2025-07-02 RX ORDER — MAGNESIUM SULFATE 500 MG/ML
2 SYRINGE (ML) INJECTION ONCE
Refills: 0 | Status: COMPLETED | OUTPATIENT
Start: 2025-07-02 | End: 2025-07-02

## 2025-07-02 RX ORDER — METOPROLOL SUCCINATE 50 MG/1
25 TABLET, EXTENDED RELEASE ORAL DAILY
Refills: 0 | Status: DISCONTINUED | OUTPATIENT
Start: 2025-07-02 | End: 2025-07-02

## 2025-07-02 RX ORDER — FUROSEMIDE 10 MG/ML
40 INJECTION INTRAMUSCULAR; INTRAVENOUS DAILY
Refills: 0 | Status: DISCONTINUED | OUTPATIENT
Start: 2025-07-02 | End: 2025-07-03

## 2025-07-02 RX ORDER — APIXABAN 2.5 MG/1
5 TABLET, FILM COATED ORAL
Refills: 0 | Status: DISCONTINUED | OUTPATIENT
Start: 2025-07-02 | End: 2025-07-03

## 2025-07-02 RX ORDER — METOPROLOL SUCCINATE 50 MG/1
25 TABLET, EXTENDED RELEASE ORAL DAILY
Refills: 0 | Status: DISCONTINUED | OUTPATIENT
Start: 2025-07-02 | End: 2025-07-03

## 2025-07-02 RX ADMIN — Medication 40 MILLIEQUIVALENT(S): at 09:54

## 2025-07-02 RX ADMIN — APIXABAN 5 MILLIGRAM(S): 2.5 TABLET, FILM COATED ORAL at 17:50

## 2025-07-02 RX ADMIN — ATORVASTATIN CALCIUM 80 MILLIGRAM(S): 80 TABLET, FILM COATED ORAL at 21:39

## 2025-07-02 RX ADMIN — Medication 1 DOSE(S): at 09:30

## 2025-07-02 RX ADMIN — Medication 100 MILLIGRAM(S): at 09:54

## 2025-07-02 RX ADMIN — Medication 25 GRAM(S): at 09:55

## 2025-07-02 RX ADMIN — CLOPIDOGREL BISULFATE 75 MILLIGRAM(S): 75 TABLET, FILM COATED ORAL at 17:50

## 2025-07-02 RX ADMIN — METOPROLOL SUCCINATE 25 MILLIGRAM(S): 50 TABLET, EXTENDED RELEASE ORAL at 17:50

## 2025-07-02 RX ADMIN — Medication 100 MILLIGRAM(S): at 15:11

## 2025-07-02 RX ADMIN — Medication 1 DOSE(S): at 19:43

## 2025-07-02 RX ADMIN — FUROSEMIDE 40 MILLIGRAM(S): 10 INJECTION INTRAMUSCULAR; INTRAVENOUS at 09:54

## 2025-07-02 RX ADMIN — Medication 100 MILLIGRAM(S): at 21:39

## 2025-07-02 NOTE — CHART NOTE - NSCHARTNOTEFT_GEN_A_CORE
MICU Transfer Note     -----Corby Hawthorne PGY-2-----    Transfer from: MICU  Transfer to:  () Medicine    (  ) Telemetry    (  ) RCU    (  ) Palliative    (  ) Stroke Unit    (  ) _______________  Accepting Physician:   Bed Assignment:  Signout given to ______    MICU admitted for:     MICU Course:    Major To Dos  [ ]  [ ]     REVIEW OF SYSTEMS:  CONSTITUTIONAL:  No weakness, fevers   EYES/ENT:  No visual changes; no facial pain, no hearring difficulties   NECK/SPINE:  No pain or stiffness  RESPIRATORY:  No cough, wheezing, hemoptysis; No shortness of breath  CARDIOVASCULAR:  No chest pain or palpitations  GASTROINTESTINAL:  Tolerates food well. No abdominal or epigastric pain. No nausea/vomiting ; No diarrhea/constipation. No melena/hematochezia.  GENITOURINARY:  No dysuria, frequency or hematuria  MUSCULOSKELETAL: No concerns with ambulation, No calf tenderness, no calf swelling   NEUROLOGICAL:  No numbness/weakness  PSYCH: Mood is appropriate   SKIN:  No itching, rashes    MEDICATIONS:  albuterol/ipratropium for Nebulization 3 milliLiter(s) Nebulizer every 6 hours PRN  atorvastatin 80 milliGRAM(s) Oral at bedtime  benzonatate 100 milliGRAM(s) Oral three times a day  fluticasone propionate/ salmeterol 250-50 MICROgram(s) Diskus 1 Dose(s) Inhalation two times a day  furosemide    Tablet 40 milliGRAM(s) Oral daily      T(C): 36.2 (07-02-25 @ 08:00), Max: 36.5 (07-01-25 @ 11:15)  HR: 74 (07-02-25 @ 09:49) (42 - 82)  BP: 121/66 (07-02-25 @ 08:00) (79/48 - 138/91)  RR: 25 (07-02-25 @ 08:00) (14 - 28)  SpO2: 96% (07-02-25 @ 09:49) (86% - 100%)  Wt(kg): --Vital Signs Last 24 Hrs  T(C): 36.2 (02 Jul 2025 08:00), Max: 36.5 (01 Jul 2025 11:15)  T(F): 97.2 (02 Jul 2025 08:00), Max: 97.7 (01 Jul 2025 11:15)  HR: 74 (02 Jul 2025 09:49) (42 - 82)  BP: 121/66 (02 Jul 2025 08:00) (79/48 - 138/91)  BP(mean): 83 (02 Jul 2025 08:00) (59 - 98)  RR: 25 (02 Jul 2025 08:00) (14 - 28)  SpO2: 96% (02 Jul 2025 09:49) (86% - 100%)    Parameters below as of 02 Jul 2025 09:49  Patient On (Oxygen Delivery Method): room air        PHYSICAL EXAM:  GENERAL: No acute distress, resting comfortably   HEAD:  Atraumatic, normocephalic  EYES: Eye movements are appropriate, pupils responsive to light, conjunctiva and sclera clear  NECK: Supple, no swelling , JVD not appreciated   HEART: Regular rate and rhythm, no murmurs or other pathological heart sounds   LUNGS: Unlabored respirations. Clear to auscultation bilaterally, no crackles, wheezing, or rhonchi  ABDOMEN: Soft, nontender/ nondistended, +BS  EXTREMITIES: +2 pulses, moves appropriately w /5 strength, no edema   NEURO:  A&Ox3, CNs not assessed  PSYCH: Affect is appropriate   SKIN: No rashes or lesions   Lines:    Consultant(s) Notes Reviewed:  [x ] YES  [ ] NO  Care Discussed with Consultants/Other Providers [ x] YES  [ ] NO    LABS:                        13.7   9.47  )-----------( 218      ( 02 Jul 2025 00:30 )             40.9     07-02    139  |  104  |  15  ----------------------------<  132[H]  3.8   |  23  |  1.02    Ca    9.3      02 Jul 2025 00:30  Phos  3.1     07-02  Mg     1.90     07-02    TPro  5.7[L]  /  Alb  3.3  /  TBili  0.5  /  DBili  x   /  AST  17  /  ALT  11  /  AlkPhos  48  07-02    PT/INR - ( 02 Jul 2025 00:30 )   PT: 12.5 sec;   INR: 1.05 ratio         PTT - ( 02 Jul 2025 00:30 )  PTT:28.1 sec  Urinalysis Basic - ( 02 Jul 2025 00:30 )    Color: x / Appearance: x / SG: x / pH: x  Gluc: 132 mg/dL / Ketone: x  / Bili: x / Urobili: x   Blood: x / Protein: x / Nitrite: x   Leuk Esterase: x / RBC: x / WBC x   Sq Epi: x / Non Sq Epi: x / Bacteria: x      CAPILLARY BLOOD GLUCOSE            Urinalysis Basic - ( 02 Jul 2025 00:30 )    Color: x / Appearance: x / SG: x / pH: x  Gluc: 132 mg/dL / Ketone: x  / Bili: x / Urobili: x   Blood: x / Protein: x / Nitrite: x   Leuk Esterase: x / RBC: x / WBC x   Sq Epi: x / Non Sq Epi: x / Bacteria: x        RADIOLOGY & ADDITIONAL TESTS:    Imaging Personally Reviewed:  [x ] YES  [ ] NO    =====Neurologic=====  Patient is AOx   - sedation:   - Titrate to RAAS -1-0    =====Cardiovascular=====  #SepticShock   Patient was admitted with a WBC ( ) and pressures of ( ) They recieved ( ) fluids and started on ( ).     Plan  - Titrate pressors for MAP > 65  - pressor:   - Cont. ab as below     =====Pulmonary=====  #AHRF (PNA)  Shortness of breath ISO of WBC. Most likely in the setting of a infectious PNA. Can also consider aspiration.  Chest X-ray showed ( )     - ventilation status:    =====GI=====  #Gastroenteritis    #Transaminitis  Elevation in transaminese in a cholestatic pattern. Most likely in the setting of sepsis. Can also consider acute pathology such as cholestitis.     Plan  - Order RUQ ultrasound  - Continue to monitor     =====Renal/=====  #Electrolytes  - mendez:    =====Infectious Disease=====  Sepsis  WBC was ( ) on admission with a fever ( )  tmax. All in the setting of (Symptoms). Chest xray was significant for ( ), CT was noted for ( _ In terms of sources they include ( ). Patient recieved ( AB) in the ED. Blood cultures.   Antibiotic course :  Blood cultures:   MRSA:     - Continue with Antibiotic (Pip tazo, meropenum, ertapenum, ceftriaxone etc)   - FU blood cultures   - Continue shock treatment as above     =====Endocrine=====  No concerns presently.     =====Heme/Onc=====  - DVT PPX: Heparin 5000 units subq   /  Lovenox 40mg  / SCDs   / Holding     =====MICUGeneral=====  Lines:    Drips:    IVF   O2:     AB   Diet:    Pain:   DVT:

## 2025-07-02 NOTE — SWALLOW BEDSIDE ASSESSMENT ADULT - SWALLOW EVAL: DIAGNOSIS
Patient presents with functional oropharyngeal swallow. Oral stage characterized by adequate oral containment, functional mastication for solids, adequate oral transit and adequate oral clearance for all PO trials.  Pharyngeal stage characterized by initiation of swallow trigger with hyolaryngeal elevation/excursion evidenced via digital palpation. No overt signs or symptoms of penetration/aspiration were noted. Patient denied globus “stuck” sensation for Regular solids/Thin Liquids.

## 2025-07-02 NOTE — SWALLOW BEDSIDE ASSESSMENT ADULT - ADDITIONAL RECOMMENDATIONS
4.) Medical Team advised to reconsult this service should there be a change in patient status.   5.) This service to follow up as schedule permits.

## 2025-07-02 NOTE — PROGRESS NOTE ADULT - SUBJECTIVE AND OBJECTIVE BOX
Patient feeling much improved overnight.  Strength in RUE back to baseline, RLE 85% better by patient report.    Mobitz Type I on monitor overnight. HD stable.  Patient planned for discharge, dispo planning underway.       Current Meds:  albuterol/ipratropium for Nebulization 3 milliLiter(s) Nebulizer every 6 hours PRN  apixaban 5 milliGRAM(s) Oral two times a day  atorvastatin 80 milliGRAM(s) Oral at bedtime  benzonatate 100 milliGRAM(s) Oral three times a day  clopidogrel Tablet 75 milliGRAM(s) Oral daily  fluticasone propionate/ salmeterol 250-50 MICROgram(s) Diskus 1 Dose(s) Inhalation two times a day  furosemide    Tablet 40 milliGRAM(s) Oral daily  metoprolol succinate ER 25 milliGRAM(s) Oral daily      Vitals:  T(F): 98.6 (), Max: 98.6 ()  HR: 80 () (66 - 80)  BP: 108/72 () (96/62 - 123/67)  RR: 19 ()  SpO2: 95% ()  I&O's Summary    2025 07:  -  2025 07:00  --------------------------------------------------------  IN: 480 mL / OUT: 500 mL / NET: -20 mL    2025 07:  -  2025 23:54  --------------------------------------------------------  IN: 50 mL / OUT: 900 mL / NET: -850 mL      PHYSICAL EXAM:  Appearance: No acute distress; well appearing  Eyes: EOMI, no scleral icterus   HEENT: Normal oral mucosa  Cardiovascular: RRR, S1, S2, no murmurs, rubs, or gallops; no edema; no JVD  Respiratory: Clear to auscultation bilaterally, no auditory stridor   Gastrointestinal: soft, non-tender, non-distended with normal bowel sounds  Musculoskeletal: No clubbing; no joint deformity   Neurologic: Moving all 4 extremities spontaneously, sensation grossly intact  Psychiatry: AAOx3, mood & affect appropriate  Skin: No rashes, ecchymoses, or cyanosis                          13.7   9.47  )-----------( 218      ( 2025 00:30 )             40.9     07-02    139  |  104  |  15  ----------------------------<  132[H]  3.8   |  23  |  1.02    Ca    9.3      2025 00:30  Phos  3.1     07-02  Mg     1.90     07-02    TPro  5.7[L]  /  Alb  3.3  /  TBili  0.5  /  DBili  x   /  AST  17  /  ALT  11  /  AlkPhos  48  07-02    PT/INR - ( 2025 00:30 )   PT: 12.5 sec;   INR: 1.05 ratio         PTT - ( 2025 00:30 )  PTT:28.1 sec  CARDIAC MARKERS ( 2025 10:06 )  33 ng/L / x     / x     / x     / x     / 2.2 ng/mL        Total Cholesterol: 148  LDL: --  HDL: 43  T        DIAGNOSTIC/IMAGING:

## 2025-07-02 NOTE — PROGRESS NOTE ADULT - ASSESSMENT
=====Neurologic=====  Patient is AOx 3    # TIA/Stroke-like Event  Patient experienced a stroke-like event on July 1st at 10:30 AM, presenting with right-sided hemiplegia (L > R) and reduced sensation in the same distribution. Received tPA. Initial CT Head (stroke protocol) showed no acute findings. Differential includes acute embolic event, cardioembolic stroke (e.g., atrial fibrillation–associated emboli), or post-catheterization embolic event.  7/2: Overall improved, no acute neurological findings. Bubble study inconclusive     Plan:  - General stroke protocol: Neuro checks per routine , BP goal =180/105, keep normothermic, euglycemic, fall precautions, aspiration precautions  - Repeat CT Head (non-contrast) on 7/2 at 10:30am   - pending MR head     =====Cardiovascular=====  #CAD  Prior history of stent in 2013. Takes atorva.  7/2: lipid panel normal, A1c 4%   - Can restart antiplatelet 24 hours after tenecteplase : pending CT read   - does he need 80mg atorva? is 40 okay?    # Mobitz-HB (Wenckebach)  EKG after the neurological event was noted for new Mobitz I (Wenckebach) heart block. Per prior EKGs, the patient has had a Type I AV block in addition to atrial fibrillation. Discussed with cardiology — no acute concerns at this time.  Cardiologist reports low concern at the moment. Patient back to baseline Type 1 AV block.   -Plan for event recorder on discharge  -Continue to monitor patient’s telemetry tracing    #AFIB  Home medication: Apixaban 5mg BID, Metoprolol   - Will FU w/neuro about when to start Eliquis  - IF pressures stable can consider starting metoprolol -> will reassess 7/3 (To continue permissive htn)     #CHF   Admission was for right heart catheterization and CardioMEMS implantation due to worsening shortness of breath in the setting of heart failure. RHC was fairly unremarkable, showing appropriate filling pressures and preserved cardiac output.    RHC findings:  RA: 4 mmHg  RV: 32/4 mmHg  PA: 31/12 (mean 20) mmHg  PCWP: 12 mmHg  CO/CI: 4.8 L/min / 2.0 L/min/m²    TTE (May 2025): Severely reduced LVEF, ascending aortic dilation, mild aortic regurgitation  - Restarted Lasix 40mg   - will continue to hold spirono + metoprolol until appropriate   - Monitor daily weights, continue general heart failure care    =====Pulmonary=====  #COPD   Patient takes inhalers at home, has significant COPD. No signs at the moment for acute COPD exacerbation  - Advair BID + Duonebs   - started tessalon pearls     =====GI=====  No acute GI concerns at this time   Tolerating regular diet, dietician approved     =====Renal/=====  No acute renal concerns   - no contrast used during RHC     =====Infectious Disease=====  #Leukocytosis Resolved  -Likely reactive NTD    =====Endocrine=====  No concerns presently.   A1c: 4%    =====Heme/Onc=====  - Tenecteplase 25 mg  DVT PPX:  If CT head repeat okay then can restart subq, will ask about full AC

## 2025-07-02 NOTE — SWALLOW BEDSIDE ASSESSMENT ADULT - ASR SWALLOW RECOMMEND DIAG
2.) Objective assessment is not warranted at this time given no overt s/s penetration/aspiration.
knee pain/injury

## 2025-07-02 NOTE — PROGRESS NOTE ADULT - ASSESSMENT
85 RH M pmhx AFib (on Eliquis, last dose on 6/28/25), CAD with stents (on Plavix), Nonischemic cardiomyopathy with AICD, COPD, HTN, HLD, GERD, who presented to hospital for Right Heart Catheterization with CardioMEMS placement (R femoral vein puncture site) on 7/1/2025. After procedure (LKW 8:40 AM on 7/1/2025), pt developed R sided weakness and numbness (NIHSS 5). CTH/A/P unrevealing. Due to high suspicion for a disabling acute ischemic stroke, pt received Tenecteplase at 10:40 AM on 7/1/2025. Patient to be admitted for post tenecteplase monitoring and undergo further stroke workup.    LKW 8:40 AM on 7/1/2025  NIHSS 5 (+1 for RUE drift, +2 for RLE drift, +2 for severe R sided sensory loss)  preMRS 2  Pt received IV tenecteplase at 10:40 AM on 7/1/2025  Pt not candidate for thrombectomy because no LVO    Impression: Neurologically back to base line. S/p  tenecteplase on 07/1   ,initially presented for cardiac *** Acute onset R sensorimotor syndrome. Localizes to L Brain dysfunction, possibly subcortical/thalamocapsular localization given absence of cortical signs. Given acute onset, high suspicion for acute ischemic stroke, for which patient received tenecteplase. Competing stroke mechanisms between cardioembolic i/s/o AFib vs periprocedural.    History of atrial fibrillation on apixaban, held for several days.  Today, he underwent cardiac cath; shortly thereafter he noted right hemiparesis, leg >arm >face, with right hemisensory loss, a sensory-motor syndrome.  His presentation is consistent with left hemispheric dysfunction, perhaps in the parasagittal region, most likely due to acute ischemic stroke.  He was treated with IV tenecteplase.    Recomendations : INCOMPLETE    []  []  []  [] 85 RH M pmhx AFib (on Eliquis, last dose on 6/28/25), CAD with stents (on Plavix), Nonischemic cardiomyopathy with AICD, COPD, HTN, HLD, GERD, who presented to hospital for Right Heart Catheterization with CardioMEMS placement (R femoral vein puncture site) on 7/1/2025. After procedure (LKW 8:40 AM on 7/1/2025), pt developed R sided weakness and numbness (NIHSS 5). CTH/A/P unrevealing. Due to high suspicion for a disabling acute ischemic stroke, pt received Tenecteplase at 10:40 AM on 7/1/2025. Patient to be admitted for post tenecteplase monitoring and undergo further stroke workup.    LKW 8:40 AM on 7/1/2025  NIHSS 5 (+1 for RUE drift, +2 for RLE drift, +2 for severe R sided sensory loss)  preMRS 2  Pt received IV tenecteplase at 10:40 AM on 7/1/2025  Pt not candidate for thrombectomy because no LVO    Impression: Neurologically back to base line. S/p  tenecteplase on 07/1.  History of atrial fibrillation on apixaban, held for several days. on 07/01, he underwent cardiac cath; shortly thereafter he noted right hemiparesis, leg >arm >face, with right hemisensory loss, a sensory-motor syndrome.  His presentation is consistent with left hemispheric dysfunction, perhaps in the parasagittal region, most likely due to acute ischemic stroke.  He was treated with IV tenecteplase.  No role for extensive neurovascular workup.  pending 24 post TNK scan     Recomendations : INCOMPLETE    []  []  []  [] 85 RH M pmhx AFib (on Eliquis, last dose on 6/28/25), CAD with stents (on Plavix), Nonischemic cardiomyopathy with AICD, COPD, HTN, HLD, GERD, who presented to hospital for Right Heart Catheterization with CardioMEMS placement (R femoral vein puncture site) on 7/1/2025. After procedure (LKW 8:40 AM on 7/1/2025), pt developed R sided weakness and numbness (NIHSS 5). CTH/A/P unrevealing. Due to high suspicion for a disabling acute ischemic stroke, pt received Tenecteplase at 10:40 AM on 7/1/2025. Patient to be admitted for post tenecteplase monitoring and undergo further stroke workup.    LKW 8:40 AM on 7/1/2025  NIHSS 5 (+1 for RUE drift, +2 for RLE drift, +2 for severe R sided sensory loss)  preMRS 2  Pt received IV tenecteplase at 10:40 AM on 7/1/2025  Pt not candidate for thrombectomy because no LVO    Impression: Neurologically improved with right sided sensory/motor symptoms . S/p  tenecteplase on 07/1.    History of atrial fibrillation on apixaban, held for several days. on 07/01, he underwent cardiac cath; shortly thereafter he noted right hemiparesis, leg >arm >face, with right hemisensory loss, a sensory-motor syndrome.  His presentation is consistent with left hemispheric dysfunction, perhaps in the parasagittal region, most likely due to acute ischemic stroke.      Recommendations :    [] From neurology stand point patient is cleared for discharge from MICU or if still any other workup is pending can be downgraded to telemetry and continue q4h neuro checks and vitals.   [x]TTE  [] BP goal normotension   [] can resume home dose Eliquis   [] Ok to resume clopidogrel (given for CAD/stent indication) but from neurovascular stand point is not needed   [x] a1c 5.7 , LDL: 89  [x] PT/OT: pending  [x] Regular diet   [] aspiration and fall precautions  [] Glucose control   [x] Stroke education and counseling  [] from neurovascular patient is clear for discharge     case discussed and seen with neurology attending Dr.Libman and the rest of neurology team   Please call for questions [phone]    plan discussed with ICU team  85 RH M pmhx AFib (on Eliquis, last dose on 6/28/25), CAD with stents (on Plavix), Nonischemic cardiomyopathy with AICD, COPD, HTN, HLD, GERD, who presented to hospital for Right Heart Catheterization with CardioMEMS placement (R femoral vein puncture site) on 7/1/2025. After procedure (LKW 8:40 AM on 7/1/2025), pt developed R sided weakness and numbness (NIHSS 5). CTH/A/P unrevealing. Due to high suspicion for a disabling acute ischemic stroke, pt received Tenecteplase at 10:40 AM on 7/1/2025. Patient to be admitted for post tenecteplase monitoring and undergo further stroke workup.    LKW 8:40 AM on 7/1/2025  NIHSS 5 (+1 for RUE drift, +2 for RLE drift, +2 for severe R sided sensory loss)  preMRS 2  Pt received IV tenecteplase at 10:40 AM on 7/1/2025  Pt not candidate for thrombectomy because no LVO    Impression: Neurologically improved with right sided sensory/motor symptoms . S/p  tenecteplase on 07/1.    History of atrial fibrillation on apixaban, held for several days. on 07/01, he underwent cardiac cath; shortly thereafter he noted right hemiparesis, leg >arm >face, with right hemisensory loss, a sensory-motor syndrome.  His presentation is consistent with left hemispheric dysfunction, perhaps in the parasagittal region, most likely due to acute ischemic stroke.      Recommendations :    [] From neurology stand point patient is cleared for discharge from MICU or if still any other workup is pending can be downgraded to telemetry and continue q4h neuro checks and vitals.   [x]TTE  [] BP goal normotension   [] can resume home dose Eliquis   [] Ok to resume clopidogrel (given for CAD/stent indication) but from neurovascular stand point is not needed   [x] a1c 5.7 , LDL: 89  [x] PT/OT: pending  [x] Regular diet   [] aspiration and fall precautions  [] Glucose control   [x] Stroke education and counseling  [] from neurovascular patient is cleared for discharge     case discussed and seen with neurology attending Dr.Libman and the rest of neurology team   Please call for questions [phone]    plan discussed with ICU team

## 2025-07-02 NOTE — PROGRESS NOTE ADULT - SUBJECTIVE AND OBJECTIVE BOX
****[facility] PGY-2*****    CHIEF COMPLAINT:    Overnight Events:  Interval Events:    OBJECTIVE:  ICU Vital Signs Last 24 Hrs  T(C): 36.2 (02 Jul 2025 07:00), Max: 37 (01 Jul 2025 08:40)  T(F): 97.2 (02 Jul 2025 07:00), Max: 98.6 (01 Jul 2025 08:40)  HR: 69 (02 Jul 2025 07:00) (42 - 82)  BP: 106/61 (02 Jul 2025 07:00) (79/48 - 138/91)  BP(mean): 76 (02 Jul 2025 07:00) (59 - 98)  ABP: --  ABP(mean): --  RR: 22 (02 Jul 2025 07:00) (14 - 28)  SpO2: 95% (02 Jul 2025 07:00) (86% - 100%)    O2 Parameters below as of 02 Jul 2025 07:00  Patient On (Oxygen Delivery Method): nasal cannula  O2 Flow (L/min): 2            07-01 @ 07:01  -  07-02 @ 07:00  --------------------------------------------------------  IN: 480 mL / OUT: 500 mL / NET: -20 mL      CAPILLARY BLOOD GLUCOSE      POCT Blood Glucose.: 100 mg/dL (01 Jul 2025 09:42)      PHYSICAL EXAM  General:   Head:    Eyes:   Neck:   Cardiac:   Lungs:   Abdomen:   Extremities:   Neuro  Psych:   Skin:  Etc:      HOSPITAL MEDICATIONS:  MEDICATIONS  (STANDING):  atorvastatin 80 milliGRAM(s) Oral at bedtime  fluticasone propionate/ salmeterol 250-50 MICROgram(s) Diskus 1 Dose(s) Inhalation two times a day    MEDICATIONS  (PRN):  albuterol/ipratropium for Nebulization 3 milliLiter(s) Nebulizer every 6 hours PRN Shortness of Breath and/or Wheezing      LABS:                        13.7   9.47  )-----------( 218      ( 02 Jul 2025 00:30 )             40.9     Hgb Trend: 13.7<--, 14.3<--, 14.7<--  07-02    139  |  104  |  15  ----------------------------<  132[H]  3.8   |  23  |  1.02    Ca    9.3      02 Jul 2025 00:30  Phos  3.1     07-02  Mg     1.90     07-02    TPro  5.7[L]  /  Alb  3.3  /  TBili  0.5  /  DBili  x   /  AST  17  /  ALT  11  /  AlkPhos  48  07-02    Creatinine Trend: 1.02<--, 1.07<--, 1.06<--  PT/INR - ( 02 Jul 2025 00:30 )   PT: 12.5 sec;   INR: 1.05 ratio         PTT - ( 02 Jul 2025 00:30 )  PTT:28.1 sec  Urinalysis Basic - ( 02 Jul 2025 00:30 )    Color: x / Appearance: x / SG: x / pH: x  Gluc: 132 mg/dL / Ketone: x  / Bili: x / Urobili: x   Blood: x / Protein: x / Nitrite: x   Leuk Esterase: x / RBC: x / WBC x   Sq Epi: x / Non Sq Epi: x / Bacteria: x        Venous Blood Gas:  07-02 @ 00:30  7.36/48/84/27/97.5  VBG Lactate: 1.2  Venous Blood Gas:  07-01 @ 08:10  --/--/--/--/67  VBG Lactate: --      MICROBIOLOGY:       RADIOLOGY:  [ ] Reviewed by me   ****[facility] PGY-2*****    CHIEF COMPLAINT: Acute ischemic stroke    Overnight Events: Some desats, placed on NC  Interval Events: Patient reported doing well he said that he doesnt quiet feel at his baseline but he is deifnitely stronger.    ROS: He endorses cough. They denied fevers/chills, shortness of breath/chest pain, abdomin pain, constipation/diarrehea, any issues with urination.      OBJECTIVE:  ICU Vital Signs Last 24 Hrs  T(C): 36.2 (02 Jul 2025 07:00), Max: 37 (01 Jul 2025 08:40)  T(F): 97.2 (02 Jul 2025 07:00), Max: 98.6 (01 Jul 2025 08:40)  HR: 69 (02 Jul 2025 07:00) (42 - 82)  BP: 106/61 (02 Jul 2025 07:00) (79/48 - 138/91)  BP(mean): 76 (02 Jul 2025 07:00) (59 - 98)  ABP: --  ABP(mean): --  RR: 22 (02 Jul 2025 07:00) (14 - 28)  SpO2: 95% (02 Jul 2025 07:00) (86% - 100%)    O2 Parameters below as of 02 Jul 2025 07:00  Patient On (Oxygen Delivery Method): nasal cannula  O2 Flow (L/min): 2            07-01 @ 07:01  -  07-02 @ 07:00  --------------------------------------------------------  IN: 480 mL / OUT: 500 mL / NET: -20 mL      CAPILLARY BLOOD GLUCOSE      POCT Blood Glucose.: 100 mg/dL (01 Jul 2025 09:42)      PHYSICAL EXAM  General: Well appearing, NAD  Head: Normocephalic  Eyes: Appropriate occular movements + dilation  Neck: no JVD  Cardiac: Regular (afib yesterday)  Lungs: Mild exp wheeze, coughing on exam  Abdomen: Nontender non distended  Extremities: no LE edema  Neuro: Ao3. CN intact. UE 5/5, LLE 5/5 RLE 4/5   Psych: Appropriate affect, improved  Skin: NA  Etc: NA    HOSPITAL MEDICATIONS:  MEDICATIONS  (STANDING):  atorvastatin 80 milliGRAM(s) Oral at bedtime  fluticasone propionate/ salmeterol 250-50 MICROgram(s) Diskus 1 Dose(s) Inhalation two times a day    MEDICATIONS  (PRN):  albuterol/ipratropium for Nebulization 3 milliLiter(s) Nebulizer every 6 hours PRN Shortness of Breath and/or Wheezing      LABS:                        13.7   9.47  )-----------( 218      ( 02 Jul 2025 00:30 )             40.9     Hgb Trend: 13.7<--, 14.3<--, 14.7<--  07-02    139  |  104  |  15  ----------------------------<  132[H]  3.8   |  23  |  1.02    Ca    9.3      02 Jul 2025 00:30  Phos  3.1     07-02  Mg     1.90     07-02    TPro  5.7[L]  /  Alb  3.3  /  TBili  0.5  /  DBili  x   /  AST  17  /  ALT  11  /  AlkPhos  48  07-02    Creatinine Trend: 1.02<--, 1.07<--, 1.06<--  PT/INR - ( 02 Jul 2025 00:30 )   PT: 12.5 sec;   INR: 1.05 ratio         PTT - ( 02 Jul 2025 00:30 )  PTT:28.1 sec  Urinalysis Basic - ( 02 Jul 2025 00:30 )    Color: x / Appearance: x / SG: x / pH: x  Gluc: 132 mg/dL / Ketone: x  / Bili: x / Urobili: x   Blood: x / Protein: x / Nitrite: x   Leuk Esterase: x / RBC: x / WBC x   Sq Epi: x / Non Sq Epi: x / Bacteria: x        Venous Blood Gas:  07-02 @ 00:30  7.36/48/84/27/97.5  VBG Lactate: 1.2  Venous Blood Gas:  07-01 @ 08:10  --/--/--/--/67  VBG Lactate: --      MICROBIOLOGY:       RADIOLOGY:  [ ] Reviewed by me   ****[facility] PGY-3*****    CHIEF COMPLAINT: Acute ischemic stroke    Overnight Events: Some desats, placed on NC  Interval Events: Patient reported doing well he said that he doesnt quiet feel at his baseline but he is deifnitely stronger.    ROS: He endorses cough. They denied fevers/chills, shortness of breath/chest pain, abdomin pain, constipation/diarrehea, any issues with urination.    OBJECTIVE:  ICU Vital Signs Last 24 Hrs  T(C): 36.2 (02 Jul 2025 07:00), Max: 37 (01 Jul 2025 08:40)  T(F): 97.2 (02 Jul 2025 07:00), Max: 98.6 (01 Jul 2025 08:40)  HR: 69 (02 Jul 2025 07:00) (42 - 82)  BP: 106/61 (02 Jul 2025 07:00) (79/48 - 138/91)  BP(mean): 76 (02 Jul 2025 07:00) (59 - 98)  ABP: --  ABP(mean): --  RR: 22 (02 Jul 2025 07:00) (14 - 28)  SpO2: 95% (02 Jul 2025 07:00) (86% - 100%)    O2 Parameters below as of 02 Jul 2025 07:00  Patient On (Oxygen Delivery Method): nasal cannula  O2 Flow (L/min): 2            07-01 @ 07:01  -  07-02 @ 07:00  --------------------------------------------------------  IN: 480 mL / OUT: 500 mL / NET: -20 mL      CAPILLARY BLOOD GLUCOSE      POCT Blood Glucose.: 100 mg/dL (01 Jul 2025 09:42)      PHYSICAL EXAM  General: Well appearing, NAD  Head: Normocephalic  Eyes: Appropriate occular movements + dilation  Neck: no JVD  Cardiac: Regular (afib yesterday)  Lungs: Mild exp wheeze, coughing on exam  Abdomen: Nontender non distended  Extremities: no LE edema  Neuro: Ao3. CN intact. UE 5/5, LLE 5/5 RLE 4/5   Psych: Appropriate affect, improved  Skin: NA  Etc: NA    HOSPITAL MEDICATIONS:  MEDICATIONS  (STANDING):  atorvastatin 80 milliGRAM(s) Oral at bedtime  fluticasone propionate/ salmeterol 250-50 MICROgram(s) Diskus 1 Dose(s) Inhalation two times a day    MEDICATIONS  (PRN):  albuterol/ipratropium for Nebulization 3 milliLiter(s) Nebulizer every 6 hours PRN Shortness of Breath and/or Wheezing      LABS:                        13.7   9.47  )-----------( 218      ( 02 Jul 2025 00:30 )             40.9     Hgb Trend: 13.7<--, 14.3<--, 14.7<--  07-02    139  |  104  |  15  ----------------------------<  132[H]  3.8   |  23  |  1.02    Ca    9.3      02 Jul 2025 00:30  Phos  3.1     07-02  Mg     1.90     07-02    TPro  5.7[L]  /  Alb  3.3  /  TBili  0.5  /  DBili  x   /  AST  17  /  ALT  11  /  AlkPhos  48  07-02    Creatinine Trend: 1.02<--, 1.07<--, 1.06<--  PT/INR - ( 02 Jul 2025 00:30 )   PT: 12.5 sec;   INR: 1.05 ratio         PTT - ( 02 Jul 2025 00:30 )  PTT:28.1 sec  Urinalysis Basic - ( 02 Jul 2025 00:30 )    Color: x / Appearance: x / SG: x / pH: x  Gluc: 132 mg/dL / Ketone: x  / Bili: x / Urobili: x   Blood: x / Protein: x / Nitrite: x   Leuk Esterase: x / RBC: x / WBC x   Sq Epi: x / Non Sq Epi: x / Bacteria: x        Venous Blood Gas:  07-02 @ 00:30  7.36/48/84/27/97.5  VBG Lactate: 1.2  Venous Blood Gas:  07-01 @ 08:10  --/--/--/--/67  VBG Lactate: --      MICROBIOLOGY:       RADIOLOGY:  [ ] Reviewed by me

## 2025-07-02 NOTE — PROGRESS NOTE ADULT - SUBJECTIVE AND OBJECTIVE BOX
Neurology Progress Note_INCOMPLETE    SUBJECTIVE/OBJECTIVE/INTERVAL EVENTS: Patient seen and examined at bedside w/ neuro attending and team. Pt stated he feels " great "     INTERVAL HISTORY: pending 24hr stability scan     REVIEW OF SYSTEMS: Few questions of a 10-system ROS was performed and is negative except for those items noted above and/or in the HPI.    VITALS & EXAMINATION:  Vital Signs Last 24 Hrs  T(C): 36.2 (02 Jul 2025 08:00), Max: 36.5 (01 Jul 2025 11:15)  T(F): 97.2 (02 Jul 2025 08:00), Max: 97.7 (01 Jul 2025 11:15)  HR: 73 (02 Jul 2025 08:00) (42 - 82)  BP: 121/66 (02 Jul 2025 08:00) (79/48 - 138/91)  BP(mean): 83 (02 Jul 2025 08:00) (59 - 98)  RR: 25 (02 Jul 2025 08:00) (14 - 28)  SpO2: 96% (02 Jul 2025 08:00) (86% - 100%)    Parameters below as of 02 Jul 2025 08:00  Patient On (Oxygen Delivery Method): room air        General:  Constitutional: Male, appears stated age, nontoxic, not in distress,       Neurological (>12):  MS: eye open, sitting in bed , eating breakfast. Awake, alert.  Oriented person place situation year month. Follows simple complex cross commands.. Attends to examiner  Language: Speech is , clear, fluent, good repetition,  comprehension, registration of words.  CNs:. VFF. EOMI. No disconjugate gaze, nystagmus. V1-3 intact LT, No facial asymmetry b/l. Hearing grossly normal b/l. Tongue midline and can move side to side.     Motor - Normal bulk and tone throughout. not drift upper and lower extremities lobo??** .    L/R (out of 5 each)       Deltoid  5/5    Biceps   5/5      Triceps  5/5         Wrist Extension 5/5   Wrist Flexion  5/5   Interossei 5/5     5/5   L/R (out of 5 each)       Hip Flexion  5/5    Hip Extension  5/5  Knee Extension  5/5  Dorsiflexion  5/5      Plantar Flexion 5/5     Sensation: Intact to LT b/l x4 extremities. Cortical: Extinction on DSS (neglect): none  Coordination: No dysmetria to FTN b/l UE  Gait: not assessed     LABORATORY:  CBC                       13.7   9.47  )-----------( 218      ( 02 Jul 2025 00:30 )             40.9     Chem 07-02    139  |  104  |  15  ----------------------------<  132[H]  3.8   |  23  |  1.02    Ca    9.3      02 Jul 2025 00:30  Phos  3.1     07-02  Mg     1.90     07-02    TPro  5.7[L]  /  Alb  3.3  /  TBili  0.5  /  DBili  x   /  AST  17  /  ALT  11  /  AlkPhos  48  07-02    LFTs LIVER FUNCTIONS - ( 02 Jul 2025 00:30 )  Alb: 3.3 g/dL / Pro: 5.7 g/dL / ALK PHOS: 48 U/L / ALT: 11 U/L / AST: 17 U/L / GGT: x           Coagulopathy PT/INR - ( 02 Jul 2025 00:30 )   PT: 12.5 sec;   INR: 1.05 ratio         PTT - ( 02 Jul 2025 00:30 )  PTT:28.1 sec  Lipid Panel 07-02 Chol 148 LDL -- HDL 43 Trig 86, 07-01 Chol 152 LDL -- HDL 48 Trig 81  A1c   Cardiac enzymes CARDIAC MARKERS ( 01 Jul 2025 10:06 )  x     / x     / x     / x     / 2.2 ng/mL      U/A Urinalysis Basic - ( 02 Jul 2025 00:30 )    Color: x / Appearance: x / SG: x / pH: x  Gluc: 132 mg/dL / Ketone: x  / Bili: x / Urobili: x   Blood: x / Protein: x / Nitrite: x   Leuk Esterase: x / RBC: x / WBC x   Sq Epi: x / Non Sq Epi: x / Bacteria: x      CSF  Immunological  Other    STUDIES & IMAGING: (EEG, CT, MR, U/S, TTE/DELANO):    CT brain: No hydrocephalus, acute intracranial hemorrhage, mass effect, or brain edema.    CT brain perfusion:  Mildly limited by motion.    Cerebral blood flow less than 30% = 0 mL.  Tmax greater than 6 seconds = 32 mL and involves the bilateral posterior   temporal and parietal lobes. The distribution is somewhat watershed in   nature. Findings may be related to the presence of cardiac disease.  Mismatch volume: 32 mL  Mismatch ratio: Infinite    CTA brain: No flow-limiting stenosis or vascular aneurysm. No AVM. Venous system is wel opacified, no evidence for venous sinus or cortical vein thrombosis.    CTA neck: No flow-limiting stenosis, evidence for acute arterial dissection, or   vascular aneurysm.    TTE:  1. A bubble study was performed with the intravenous injection of agitated saline contrast and was inconclusive regarding the presence of an interatrial shunt. Neurology Progress Note_INCOMPLETE    SUBJECTIVE/OBJECTIVE/INTERVAL EVENTS: Patient seen and examined at bedside w/ neuro attending and team. Pt stated he feels " great " s/p TNK on 07/01    INTERVAL HISTORY: pending 24hr stability scan     REVIEW OF SYSTEMS: Few questions of a 10-system ROS was performed and is negative except for those items noted above and/or in the HPI.    VITALS & EXAMINATION:  Vital Signs Last 24 Hrs  T(C): 36.2 (02 Jul 2025 08:00), Max: 36.5 (01 Jul 2025 11:15)  T(F): 97.2 (02 Jul 2025 08:00), Max: 97.7 (01 Jul 2025 11:15)  HR: 73 (02 Jul 2025 08:00) (42 - 82)  BP: 121/66 (02 Jul 2025 08:00) (79/48 - 138/91)  BP(mean): 83 (02 Jul 2025 08:00) (59 - 98)  RR: 25 (02 Jul 2025 08:00) (14 - 28)  SpO2: 96% (02 Jul 2025 08:00) (86% - 100%)    Parameters below as of 02 Jul 2025 08:00  Patient On (Oxygen Delivery Method): room air        General:  Constitutional: Male, appears stated age, nontoxic, not in distress,       Neurological (>12):  MS: eye open, sitting in bed , eating breakfast. Awake, alert.  Oriented person place situation year month. Follows simple complex cross commands.. Attends to examiner  Language: Speech is , clear, fluent, good repetition,  comprehension, registration of words.  CNs:. VFF. EOMI. No disconjugate gaze, nystagmus. V1-3 intact LT, No facial asymmetry b/l. Hearing grossly normal b/l. Tongue midline and can move side to side.     Motor - Normal bulk and tone throughout. not drift upper and lower extremities lobo??** .    L/R (out of 5 each)       Deltoid  5/5    Biceps   5/5      Triceps  5/5         Wrist Extension 5/5   Wrist Flexion  5/5   Interossei 5/5     5/5   L/R (out of 5 each)       Hip Flexion  5/5    Hip Extension  5/5  Knee Extension  5/5  Dorsiflexion  5/5      Plantar Flexion 5/5     Sensation: Intact to LT b/l x4 extremities. Cortical: Extinction on DSS (neglect): none  Coordination: No dysmetria to FTN b/l UE  Gait: not assessed     LABORATORY:  CBC                       13.7   9.47  )-----------( 218      ( 02 Jul 2025 00:30 )             40.9     Chem 07-02    139  |  104  |  15  ----------------------------<  132[H]  3.8   |  23  |  1.02    Ca    9.3      02 Jul 2025 00:30  Phos  3.1     07-02  Mg     1.90     07-02    TPro  5.7[L]  /  Alb  3.3  /  TBili  0.5  /  DBili  x   /  AST  17  /  ALT  11  /  AlkPhos  48  07-02    LFTs LIVER FUNCTIONS - ( 02 Jul 2025 00:30 )  Alb: 3.3 g/dL / Pro: 5.7 g/dL / ALK PHOS: 48 U/L / ALT: 11 U/L / AST: 17 U/L / GGT: x           Coagulopathy PT/INR - ( 02 Jul 2025 00:30 )   PT: 12.5 sec;   INR: 1.05 ratio         PTT - ( 02 Jul 2025 00:30 )  PTT:28.1 sec  Lipid Panel 07-02 Chol 148 LDL -- HDL 43 Trig 86, 07-01 Chol 152 LDL -- HDL 48 Trig 81  A1c 5.7      STUDIES & IMAGING:):    on 07/01:   CT brain: No hydrocephalus, acute intracranial hemorrhage, mass effect, or brain edema.    CT brain perfusion:  Mildly limited by motion.  Cerebral blood flow less than 30% = 0 mL.  Tmax greater than 6 seconds = 32 mL and involves the bilateral posterior   temporal and parietal lobes. The distribution is somewhat watershed in   nature. Findings may be related to the presence of cardiac disease.  Mismatch volume: 32 mL  Mismatch ratio: Infinite    CTA brain: No flow-limiting stenosis or vascular aneurysm. No AVM. Venous system is wel opacified, no evidence for venous sinus or cortical vein thrombosis.    CTA neck: No flow-limiting stenosis, evidence for acute arterial dissection, or   vascular aneurysm.    TTE:  1. A bubble study was performed with the intravenous injection of agitated saline contrast and was inconclusive regarding the presence of an interatrial shunt. Neurology Progress Note_    SUBJECTIVE/OBJECTIVE/INTERVAL EVENTS: Patient seen and examined at bedside w/ neuro attending and team. Pt stated he feels " great " s/p TNK on 07/01. denied any residual symptoms. Denied weakness or numbness. Awake lying in bed eating his breakfast .     INTERVAL HISTORY: 24hr stability scan was done and it showing : Mild chronic microvascular changes without evidence of an acute transcortical infarction or hemorrhage.    REVIEW OF SYSTEMS: Few questions of a 10-system ROS was performed and is negative except for those items noted above and/or in the HPI.    VITALS & EXAMINATION:  Vital Signs Last 24 Hrs  T(C): 36.2 (02 Jul 2025 08:00), Max: 36.5 (01 Jul 2025 11:15)  T(F): 97.2 (02 Jul 2025 08:00), Max: 97.7 (01 Jul 2025 11:15)  HR: 73 (02 Jul 2025 08:00) (42 - 82)  BP: 121/66 (02 Jul 2025 08:00) (79/48 - 138/91)  BP(mean): 83 (02 Jul 2025 08:00) (59 - 98)  RR: 25 (02 Jul 2025 08:00) (14 - 28)  SpO2: 96% (02 Jul 2025 08:00) (86% - 100%)    Parameters below as of 02 Jul 2025 08:00  Patient On (Oxygen Delivery Method): room air        General:  Constitutional: Male, appears stated age, nontoxic, not in distress,       Neurological (>12):  MS: eye open, sitting in bed , eating breakfast. Awake, alert.  Oriented person place situation year month. Follows simple complex cross commands.. Attends to examiner  Language: Speech is , clear, fluent, good repetition,  comprehension, registration of words.  CNs:. VFF. EOMI. No disconjugate gaze, nystagmus. V1-3 intact LT, subtle Rt facial asymmetry . Hearing grossly normal b/l. Tongue midline and can move side to side.   Motor - Normal bulk and tone throughout. not drift upper and lower extremities bill    L/R (out of 5 each)       L/R (out of 5 each)      Dorsiflexion  5/5       Sensation: Intact to LT b/l x4 extremities. Cortical: Extinction on DSS (neglect): none, temp sensation is normal   Coordination: No dysmetria to FTN b/l UE  Gait: not assessed     LABORATORY:  CBC                       13.7   9.47  )-----------( 218      ( 02 Jul 2025 00:30 )             40.9     Chem 07-02    139  |  104  |  15  ----------------------------<  132[H]  3.8   |  23  |  1.02    Ca    9.3      02 Jul 2025 00:30  Phos  3.1     07-02  Mg     1.90     07-02    TPro  5.7[L]  /  Alb  3.3  /  TBili  0.5  /  DBili  x   /  AST  17  /  ALT  11  /  AlkPhos  48  07-02    LFTs LIVER FUNCTIONS - ( 02 Jul 2025 00:30 )  Alb: 3.3 g/dL / Pro: 5.7 g/dL / ALK PHOS: 48 U/L / ALT: 11 U/L / AST: 17 U/L / GGT: x           Coagulopathy PT/INR - ( 02 Jul 2025 00:30 )   PT: 12.5 sec;   INR: 1.05 ratio         PTT - ( 02 Jul 2025 00:30 )  PTT:28.1 sec  Lipid Panel 07-02 Chol 148 LDL -- HDL 43 Trig 86, 07-01 Chol 152 LDL -- HDL 48 Trig 81  A1c 5.7      STUDIES & IMAGING:):    on 07/01:   CT brain: No hydrocephalus, acute intracranial hemorrhage, mass effect, or brain edema.    CT brain perfusion:  Mildly limited by motion.  Cerebral blood flow less than 30% = 0 mL.  Tmax greater than 6 seconds = 32 mL and involves the bilateral posterior   temporal and parietal lobes. The distribution is somewhat watershed in   nature. Findings may be related to the presence of cardiac disease.  Mismatch volume: 32 mL  Mismatch ratio: Infinite    CTA brain: No flow-limiting stenosis or vascular aneurysm. No AVM. Venous system is wel opacified, no evidence for venous sinus or cortical vein thrombosis.    CTA neck: No flow-limiting stenosis, evidence for acute arterial dissection, or   vascular aneurysm.    TTE:  1. A bubble study was performed with the intravenous injection of agitated saline contrast and was   inconclusive regarding the presence of an interatrial shunt.    on 07/02  repeat CTH showed :  Mild chronic microvascular changes without evidence of an acute transcortical infarction or hemorrhage.   Neurology Progress Note_    SUBJECTIVE/OBJECTIVE/INTERVAL EVENTS: Patient seen and examined at bedside w/ neuro attending and team. Pt stated he feels " great " s/p TNK on 07/01. denied any residual symptoms. Denied weakness or numbness. Awake lying in bed eating his breakfast .     INTERVAL HISTORY: 24hr stability scan was done and it showing : Mild chronic microvascular changes without evidence of an acute transcortical infarction or hemorrhage.    REVIEW OF SYSTEMS: Few questions of a 10-system ROS was performed and is negative except for those items noted above and/or in the HPI.    VITALS & EXAMINATION:  Vital Signs Last 24 Hrs  T(C): 36.2 (02 Jul 2025 08:00), Max: 36.5 (01 Jul 2025 11:15)  T(F): 97.2 (02 Jul 2025 08:00), Max: 97.7 (01 Jul 2025 11:15)  HR: 73 (02 Jul 2025 08:00) (42 - 82)  BP: 121/66 (02 Jul 2025 08:00) (79/48 - 138/91)  BP(mean): 83 (02 Jul 2025 08:00) (59 - 98)  RR: 25 (02 Jul 2025 08:00) (14 - 28)  SpO2: 96% (02 Jul 2025 08:00) (86% - 100%)    Parameters below as of 02 Jul 2025 08:00  Patient On (Oxygen Delivery Method): room air        General:  Constitutional: Male, appears stated age, nontoxic, not in distress,       Neurological (>12):  MS: eye open, sitting in bed , eating breakfast. Awake, alert.  Oriented person place situation year month. Follows simple complex cross commands.. Attends to examiner  Language: Speech is , clear, fluent, good repetition,  comprehension, registration of words.  CNs:. VFF. EOMI. No disconjugate gaze, nystagmus. V1-3 intact LT, subtle Rt facial droop. Hearing grossly normal b/l. Tongue midline and can move side to side.   Motor - Normal bulk and tone throughout. not drift upper and lower extremities bill    L/R (out of 5 each)       L/R (out of 5 each)      Dorsiflexion  5/5       Sensation: Intact to LT b/l x4 extremities. Cortical: Extinction on DSS (neglect): none, temp sensation is normal   Coordination: No dysmetria to FTN b/l UE  Gait: not assessed     LABORATORY:  CBC                       13.7   9.47  )-----------( 218      ( 02 Jul 2025 00:30 )             40.9     Chem 07-02    139  |  104  |  15  ----------------------------<  132[H]  3.8   |  23  |  1.02    Ca    9.3      02 Jul 2025 00:30  Phos  3.1     07-02  Mg     1.90     07-02    TPro  5.7[L]  /  Alb  3.3  /  TBili  0.5  /  DBili  x   /  AST  17  /  ALT  11  /  AlkPhos  48  07-02    LFTs LIVER FUNCTIONS - ( 02 Jul 2025 00:30 )  Alb: 3.3 g/dL / Pro: 5.7 g/dL / ALK PHOS: 48 U/L / ALT: 11 U/L / AST: 17 U/L / GGT: x           Coagulopathy PT/INR - ( 02 Jul 2025 00:30 )   PT: 12.5 sec;   INR: 1.05 ratio         PTT - ( 02 Jul 2025 00:30 )  PTT:28.1 sec  Lipid Panel 07-02 Chol 148 LDL -- HDL 43 Trig 86, 07-01 Chol 152 LDL -- HDL 48 Trig 81  A1c 5.7      STUDIES & IMAGING:):    on 07/01:   CT brain: No hydrocephalus, acute intracranial hemorrhage, mass effect, or brain edema.    CT brain perfusion:  Mildly limited by motion.  Cerebral blood flow less than 30% = 0 mL.  Tmax greater than 6 seconds = 32 mL and involves the bilateral posterior   temporal and parietal lobes. The distribution is somewhat watershed in   nature. Findings may be related to the presence of cardiac disease.  Mismatch volume: 32 mL  Mismatch ratio: Infinite    CTA brain: No flow-limiting stenosis or vascular aneurysm. No AVM. Venous system is wel opacified, no evidence for venous sinus or cortical vein thrombosis.    CTA neck: No flow-limiting stenosis, evidence for acute arterial dissection, or   vascular aneurysm.    TTE:  1. A bubble study was performed with the intravenous injection of agitated saline contrast and was   inconclusive regarding the presence of an interatrial shunt.    on 07/02  repeat CTH showed :  Mild chronic microvascular changes without evidence of an acute transcortical infarction or hemorrhage.

## 2025-07-02 NOTE — PROGRESS NOTE ADULT - ASSESSMENT
FALLON HUNTER is a 85y Male presenting to the hospital for PA monitor implantation.  Course complicated by acute right sided weakness s/p treatment for CVA.    #PA monitoring implantation  - s/p successful implantation of CardioMEMS monitor  - monitor teaching completed  - continue home plavix + eliquis, cleared by neuro service  - Dr. Cassidy will arrange follow-up for patch monitoring    #CVA  - unclear etiology. no known PFO, ?afib cardioembolic source  - cleared for apixaban by neuro service  - patient mostly recovered at this time, ambulating without difficulty  - continued follow-up with neurology service    Case discussed with patient, his son by phone, MICU team, outpatient cardiologist - Dr. Cassidy.    ________________  [place] Mejia MD  Interventional & Structural Cardiology

## 2025-07-02 NOTE — SWALLOW BEDSIDE ASSESSMENT ADULT - COMMENTS
7/1 Neurology Note: "85 RH M pmhx AFib (on Eliquis, last dose on 6/28/25), CAD with stents (on Plavix), Nonischemic cardiomyopathy with AICD, COPD, HTN, HLD, GERD, who presented to hospital for Right Heart Catheterization with CardioMEMS placement (R femoral vein puncture site) on 7/1/2025. Patient underwent procedure at approximately 7:30 AM. Procedure successful, patient came to interventional recovery unit afterwards, and had no neurologic symptoms. LKW 8:40 AM, confirmed by interventional recovery nurse. At approximately 9:10 AM, pt noted to have R sided weakness and numbness, for which RRT was called and subsequently upgraded to code stroke. Pt said the R sided weakness and numbness started all of a sudden and came out of nowhere. Pt has never had these symptoms before. Pt denies blurry vision, double vision, tinnitus, vertigo, LoC, involuntary movements, slurred speech, dysphagia. At baseline pt uses cane but is fully independent and still works part time. Denies prior strokes."    7/1 MICU Note: "BEHZAD HUNTER is a 85y male with PMH AFib (on Eliquis, last dose on 6/28/25), CAD with stents (on Plavix), Nonischemic cardiomyopathy with AICD, COPD, HTN, HLD, GERD, was in hospital for right Heart Catheterization with CardioMEMS placement, had subsequent stroke with LKW at 8:40AM. Was transferred to the MICU for q1 neuro checks s/p tenecteplase."    7/1 CT Head: "IMPRESSION: CT brain: No hydrocephalus, acute intracranial hemorrhage, mass effect, or brain edema."    No recent chest imaging from this admission.     Patient received sitting upright in bed, A&Ox4, pleasant and conversant. Patient reports feeling back to his baseline, and denies difficulty chewing/swallowing. Patient reports tolerating Regular breakfast tray this morning without incident. Patient agreeable to PO trials for evaluation.

## 2025-07-03 ENCOUNTER — TRANSCRIPTION ENCOUNTER (OUTPATIENT)
Age: 85
End: 2025-07-03

## 2025-07-03 VITALS
HEART RATE: 78 BPM | DIASTOLIC BLOOD PRESSURE: 98 MMHG | OXYGEN SATURATION: 95 % | SYSTOLIC BLOOD PRESSURE: 110 MMHG | RESPIRATION RATE: 16 BRPM

## 2025-07-03 PROBLEM — Z95.810 PRESENCE OF AUTOMATIC (IMPLANTABLE) CARDIAC DEFIBRILLATOR: Chronic | Status: ACTIVE | Noted: 2025-07-01

## 2025-07-03 PROBLEM — J44.9 CHRONIC OBSTRUCTIVE PULMONARY DISEASE, UNSPECIFIED: Chronic | Status: ACTIVE | Noted: 2025-07-01

## 2025-07-03 LAB
ALBUMIN SERPL ELPH-MCNC: 3.5 G/DL — SIGNIFICANT CHANGE UP (ref 3.3–5)
ALP SERPL-CCNC: 53 U/L — SIGNIFICANT CHANGE UP (ref 40–120)
ALT FLD-CCNC: 12 U/L — SIGNIFICANT CHANGE UP (ref 4–41)
ANION GAP SERPL CALC-SCNC: 13 MMOL/L — SIGNIFICANT CHANGE UP (ref 7–14)
APTT BLD: 31.5 SEC — SIGNIFICANT CHANGE UP (ref 26.1–36.8)
AST SERPL-CCNC: 15 U/L — SIGNIFICANT CHANGE UP (ref 4–40)
BILIRUB SERPL-MCNC: 0.2 MG/DL — SIGNIFICANT CHANGE UP (ref 0.2–1.2)
BLOOD GAS VENOUS COMPREHENSIVE RESULT: SIGNIFICANT CHANGE UP
BUN SERPL-MCNC: 20 MG/DL — SIGNIFICANT CHANGE UP (ref 7–23)
CALCIUM SERPL-MCNC: 9.5 MG/DL — SIGNIFICANT CHANGE UP (ref 8.4–10.5)
CHLORIDE SERPL-SCNC: 101 MMOL/L — SIGNIFICANT CHANGE UP (ref 98–107)
CO2 SERPL-SCNC: 24 MMOL/L — SIGNIFICANT CHANGE UP (ref 22–31)
CREAT SERPL-MCNC: 1.23 MG/DL — SIGNIFICANT CHANGE UP (ref 0.5–1.3)
EGFR: 58 ML/MIN/1.73M2 — LOW
EGFR: 58 ML/MIN/1.73M2 — LOW
GLUCOSE SERPL-MCNC: 117 MG/DL — HIGH (ref 70–99)
HCT VFR BLD CALC: 42.1 % — SIGNIFICANT CHANGE UP (ref 39–50)
HGB BLD-MCNC: 13.6 G/DL — SIGNIFICANT CHANGE UP (ref 13–17)
INR BLD: 1.23 RATIO — HIGH (ref 0.85–1.16)
MAGNESIUM SERPL-MCNC: 1.9 MG/DL — SIGNIFICANT CHANGE UP (ref 1.6–2.6)
MCHC RBC-ENTMCNC: 31.9 PG — SIGNIFICANT CHANGE UP (ref 27–34)
MCHC RBC-ENTMCNC: 32.3 G/DL — SIGNIFICANT CHANGE UP (ref 32–36)
MCV RBC AUTO: 98.6 FL — SIGNIFICANT CHANGE UP (ref 80–100)
MRSA PCR RESULT.: SIGNIFICANT CHANGE UP
NRBC # BLD AUTO: 0 K/UL — SIGNIFICANT CHANGE UP (ref 0–0)
NRBC # FLD: 0 K/UL — SIGNIFICANT CHANGE UP (ref 0–0)
NRBC BLD AUTO-RTO: 0 /100 WBCS — SIGNIFICANT CHANGE UP (ref 0–0)
PHOSPHATE SERPL-MCNC: 3.6 MG/DL — SIGNIFICANT CHANGE UP (ref 2.5–4.5)
PLATELET # BLD AUTO: 244 K/UL — SIGNIFICANT CHANGE UP (ref 150–400)
PMV BLD: 9.6 FL — SIGNIFICANT CHANGE UP (ref 7–13)
POTASSIUM SERPL-MCNC: 3.9 MMOL/L — SIGNIFICANT CHANGE UP (ref 3.5–5.3)
POTASSIUM SERPL-SCNC: 3.9 MMOL/L — SIGNIFICANT CHANGE UP (ref 3.5–5.3)
PROT SERPL-MCNC: 5.8 G/DL — LOW (ref 6–8.3)
PROTHROM AB SERPL-ACNC: 14.2 SEC — HIGH (ref 9.9–13.4)
RBC # BLD: 4.27 M/UL — SIGNIFICANT CHANGE UP (ref 4.2–5.8)
RBC # FLD: 13.2 % — SIGNIFICANT CHANGE UP (ref 10.3–14.5)
S AUREUS DNA NOSE QL NAA+PROBE: SIGNIFICANT CHANGE UP
SODIUM SERPL-SCNC: 138 MMOL/L — SIGNIFICANT CHANGE UP (ref 135–145)
WBC # BLD: 10.27 K/UL — SIGNIFICANT CHANGE UP (ref 3.8–10.5)
WBC # FLD AUTO: 10.27 K/UL — SIGNIFICANT CHANGE UP (ref 3.8–10.5)

## 2025-07-03 PROCEDURE — 99232 SBSQ HOSP IP/OBS MODERATE 35: CPT | Mod: GC

## 2025-07-03 RX ORDER — ATORVASTATIN CALCIUM 80 MG/1
1 TABLET, FILM COATED ORAL
Qty: 30 | Refills: 0
Start: 2025-07-03 | End: 2025-08-01

## 2025-07-03 RX ORDER — BENZONATATE 100 MG
100 CAPSULE ORAL ONCE
Refills: 0 | Status: COMPLETED | OUTPATIENT
Start: 2025-07-03 | End: 2025-07-03

## 2025-07-03 RX ORDER — ATORVASTATIN CALCIUM 80 MG/1
1 TABLET, FILM COATED ORAL
Refills: 0 | DISCHARGE

## 2025-07-03 RX ADMIN — APIXABAN 5 MILLIGRAM(S): 2.5 TABLET, FILM COATED ORAL at 05:27

## 2025-07-03 RX ADMIN — Medication 1 DOSE(S): at 09:07

## 2025-07-03 RX ADMIN — Medication 100 MILLIGRAM(S): at 05:27

## 2025-07-03 RX ADMIN — Medication 100 MILLIGRAM(S): at 00:38

## 2025-07-03 RX ADMIN — FUROSEMIDE 40 MILLIGRAM(S): 10 INJECTION INTRAMUSCULAR; INTRAVENOUS at 05:28

## 2025-07-03 NOTE — CHART NOTE - NSCHARTNOTEFT_GEN_A_CORE
Patient approximately 48 hours from LKW. At this time, suggest BP parameters to reflect gradual normotension to a target of 130/70 mmHg (no longer need permissive HTN up to 180/105 mmHg). C/w stroke neurochecks q4hr. To reiterate previous recommendations, patient is cleared for discharge from neurovascular standpoint and safe to resume home Apixaban 5 mg BID.     Discussed with stroke attending, Dr. Libman.     Please call with questions: [phone] Patient approximately 48 hours from LKW. At this time, suggest BP parameters to reflect gradual normotension to a target of 130/70 mmHg (no longer need permissive HTN up to 180/105 mmHg). C/w stroke neurochecks q4hr. To reiterate previous recommendations, patient is cleared for discharge from neurovascular standpoint and safe to resume home Apixaban 5 mg BID.     Discussed with stroke attending, Dr. Libman.     Please call with questions: [phone]    Stroke attending: agree with above

## 2025-07-03 NOTE — DISCHARGE NOTE PROVIDER - PROVIDER TOKENS
PROVIDER:[TOKEN:[[id number]],FOLLOWUP:[1 week],ESTABLISHEDPATIENT:[T]],PROVIDER:[TOKEN:[[id number]],FOLLOWUP:[1 week],ESTABLISHEDPATIENT:[T]]

## 2025-07-03 NOTE — PROGRESS NOTE ADULT - ATTENDING COMMENTS
85 y.o. male with PMH of CAD, h/o PCI with stent, A. Fib (on Eliquis), AICD,  HTN, HLD, COPD who presented for a RHC and Cardiomems placement. Patient s/p RHC, but post with right sided weakness, RLL> RUE. Code stroke s/p TNK 1040. CT scan without findings. RUE now improved but admitted to MICU for POST TNK care.     # CVA  # CHF  # COPD  - Post cath with right seded deficits s/p TNK.   - Pending repeat CTH at 24 hours, MRI ordered. and CTH PRN for clinical changes.   - TTE with bubble study -- negative.   - on high dose intense statins  - Per Neuro if repeat CT in 24 hours is negative for bleed can start home antiplatelets.   - PT/OT  - Passed dysphagia screening.   - Permissive hypertension, Antihypertensive as needed, BP goal from neuro noted.   - Monitor for post TNK bleeding. angioedema  - Neuro check as ordered based on Stroke orderset.   - f/u with neuro recs.   - Duonebs PRN< advair.   - Monitor for bleeding especially at the post cath site  - DVT ppx- SCD  - Dispo- Is DNR/DNI per patient.
85 y.o. male with PMH of CAD, h/o PCI with stent, A. Fib (on Eliquis), AICD,  HTN, HLD, COPD who presented for a RHC and Cardiomems placement. Patient s/p RHC, but post with right sided weakness, RLL> RUE. Code stroke s/p TNK 1040. CT scan without findings. RUE now improved but admitted to MICU for POST TNK care.     Leg strength improved. PT eval- home with home PT. D/W cards, neuro okay to d/c home without patient MRI and loop recorder.     # CVA  # CHF  # COPD  - Post cath with right seded deficits s/p TNK.   - Repeat CTH at 24 hours stable, MRI as outpatient.   - TTE with bubble study -- negative.   - on high dose intense statins  - C/W neuro and cards, can restart Plavix and full a.c   - PT/OT- outpatient PT/OT  - Passed dysphagia screening.   - Will start re-introducing home BP meds.   - Neuro check as ordered based on Stroke orderset.   - f/u with neuro recs.   - Duonebs PRN< advair.   - Monitor for bleeding especially at the post cath site  - DVT ppx- SCD  - Dispo- Is DNR/DNI per patient. D/C planning.

## 2025-07-03 NOTE — PROGRESS NOTE ADULT - SUBJECTIVE AND OBJECTIVE BOX
****[facility] PGY-2*****    CHIEF COMPLAINT:    Overnight Events:  Interval Events:    They denied fevers/chills, shortness of breath/chest pain, abdomin pain, constipation/diarrehea, any issues with urination.    OBJECTIVE:  ICU Vital Signs Last 24 Hrs  T(C): 36.7 (03 Jul 2025 04:00), Max: 37 (02 Jul 2025 20:00)  T(F): 98 (03 Jul 2025 04:00), Max: 98.6 (02 Jul 2025 20:00)  HR: 64 (03 Jul 2025 05:00) (64 - 80)  BP: 107/69 (03 Jul 2025 05:00) (101/62 - 123/67)  BP(mean): 81 (03 Jul 2025 05:00) (71 - 90)  ABP: --  ABP(mean): --  RR: 19 (03 Jul 2025 05:00) (16 - 32)  SpO2: 96% (03 Jul 2025 05:00) (93% - 99%)    O2 Parameters below as of 03 Jul 2025 05:00  Patient On (Oxygen Delivery Method): room air              07-01 @ 07:01  -  07-02 @ 07:00  --------------------------------------------------------  IN: 480 mL / OUT: 500 mL / NET: -20 mL    07-02 @ 07:01  -  07-03 @ 05:49  --------------------------------------------------------  IN: 650 mL / OUT: 1450 mL / NET: -800 mL      CAPILLARY BLOOD GLUCOSE      POCT Blood Glucose.: 100 mg/dL (01 Jul 2025 09:42)      PHYSICAL EXAM  GENERAL: Nontoxic-appearing, resting comfortably, NAD  HEAD: Normocephalic, atraumatic  EYES: Clear sclera, appropriate dilation   NECK: Supple, no JVD, no lymphadenopathy   HEART: Regular R/R, no pathologic heart sounds   LUNGS: Clear to auscultation bilaterally, no rales, wheezes, or rhonchi  ABDOMEN: Soft, non-distended, non-tender, normoactive bowel sounds  EXTREMITIES: No edema, no cyanosis, pulses intact  NEURO: AO3   PSYCH: Appropriate affect   SKIN: Warm, dry, no rashes or lesions    HOSPITAL MEDICATIONS:  MEDICATIONS  (STANDING):  apixaban 5 milliGRAM(s) Oral two times a day  atorvastatin 80 milliGRAM(s) Oral at bedtime  benzonatate 100 milliGRAM(s) Oral three times a day  clopidogrel Tablet 75 milliGRAM(s) Oral daily  fluticasone propionate/ salmeterol 250-50 MICROgram(s) Diskus 1 Dose(s) Inhalation two times a day  furosemide    Tablet 40 milliGRAM(s) Oral daily  metoprolol succinate ER 25 milliGRAM(s) Oral daily    MEDICATIONS  (PRN):  albuterol/ipratropium for Nebulization 3 milliLiter(s) Nebulizer every 6 hours PRN Shortness of Breath and/or Wheezing      LABS:                        13.6   10.27 )-----------( 244      ( 03 Jul 2025 00:52 )             42.1     Hgb Trend: 13.6<--, 13.7<--, 14.3<--, 14.7<--  07-03    138  |  101  |  20  ----------------------------<  117[H]  3.9   |  24  |  1.23    Ca    9.5      03 Jul 2025 00:52  Phos  3.6     07-03  Mg     1.90     07-03    TPro  5.8[L]  /  Alb  3.5  /  TBili  0.2  /  DBili  x   /  AST  15  /  ALT  12  /  AlkPhos  53  07-03    Creatinine Trend: 1.23<--, 1.02<--, 1.07<--, 1.06<--  PT/INR - ( 03 Jul 2025 00:52 )   PT: 14.2 sec;   INR: 1.23 ratio         PTT - ( 03 Jul 2025 00:52 )  PTT:31.5 sec  Urinalysis Basic - ( 03 Jul 2025 00:52 )    Color: x / Appearance: x / SG: x / pH: x  Gluc: 117 mg/dL / Ketone: x  / Bili: x / Urobili: x   Blood: x / Protein: x / Nitrite: x   Leuk Esterase: x / RBC: x / WBC x   Sq Epi: x / Non Sq Epi: x / Bacteria: x        Venous Blood Gas:  07-03 @ 00:52  7.39/49/68/30/94.5  VBG Lactate: 1.1  Venous Blood Gas:  07-02 @ 00:30  7.36/48/84/27/97.5  VBG Lactate: 1.2  Venous Blood Gas:  07-01 @ 08:10  --/--/--/--/67  VBG Lactate: --      MICROBIOLOGY:       RADIOLOGY:  [ ] Reviewed by me

## 2025-07-03 NOTE — DISCHARGE NOTE PROVIDER - NSDCMRMEDTOKEN_GEN_ALL_CORE_FT
apixaban 5 mg oral tablet: 1 tab(s) orally 2 times a day HOLD DASH CACERES, RESTART ON 7/2/25 IN THE MORNING  atorvastatin 80 mg oral tablet: 1 tab(s) orally once a day  clopidogrel 75 mg oral tablet: 1 tab(s) orally once a day  fluticasone-salmeterol 250 mcg-50 mcg/inh inhalation powder: 1 inhaler(s) inhaled 2 times a day  furosemide 20 mg oral tablet: 1 tab(s) orally 2 times a day  guaiFENesin 1200 mg oral tablet, extended release: 1 tab(s) orally every 12 hours as needed for  cough  magnesium oxide 400 mg oral tablet: 1 tab(s) orally once a day  metoprolol succinate 25 mg oral tablet, extended release: 1 tab(s) orally once a day (at bedtime)  Multiple Vitamins oral tablet: 1 tab(s) orally once a day  pantoprazole 40 mg oral delayed release tablet: 1 tab(s) orally once a day (at bedtime)  Potassium Chloride (Eqv-Klor-Con 10) 10 mEq oral tablet, extended release: 1 tab(s) orally once a day  spironolactone 25 mg oral tablet: 1 tab(s) orally once a day  valsartan 40 mg oral tablet: 1 tab(s) orally once a day   apixaban 5 mg oral tablet: 1 tab(s) orally 2 times a day HOLD DASH CACERES, RESTART ON 7/2/25 IN THE MORNING  atorvastatin 80 mg oral tablet: 1 tab(s) orally once a day  clopidogrel 75 mg oral tablet: 1 tab(s) orally once a day  fluticasone-salmeterol 250 mcg-50 mcg/inh inhalation powder: 1 inhaler(s) inhaled 2 times a day  furosemide 20 mg oral tablet: 1 tab(s) orally 2 times a day  guaiFENesin 1200 mg oral tablet, extended release: 1 tab(s) orally every 12 hours as needed for  cough  magnesium oxide 400 mg oral tablet: 1 tab(s) orally once a day  metoprolol succinate 25 mg oral tablet, extended release: 1 tab(s) orally once a day (at bedtime)  Multiple Vitamins oral tablet: 1 tab(s) orally once a day  pantoprazole 40 mg oral delayed release tablet: 1 tab(s) orally once a day (at bedtime)  Potassium Chloride (Eqv-Klor-Con 10) 10 mEq oral tablet, extended release: 1 tab(s) orally once a day   apixaban 5 mg oral tablet: 1 tab(s) orally 2 times a day HOLD DASH CACERES, RESTART ON 7/2/25 IN THE MORNING  atorvastatin 80 mg oral tablet: 1 tab(s) orally once a day  clopidogrel 75 mg oral tablet: 1 tab(s) orally once a day  fluticasone-salmeterol 250 mcg-50 mcg/inh inhalation powder: 1 inhaler(s) inhaled 2 times a day  furosemide 20 mg oral tablet: 1 tab(s) orally 2 times a day  guaiFENesin 1200 mg oral tablet, extended release: 1 tab(s) orally every 12 hours as needed for  cough  magnesium oxide 400 mg oral tablet: 1 tab(s) orally once a day  metoprolol succinate 25 mg oral tablet, extended release: 1 tab(s) orally once a day (at bedtime)  Multiple Vitamins oral tablet: 1 tab(s) orally once a day  pantoprazole 40 mg oral delayed release tablet: 1 tab(s) orally once a day (at bedtime)  Physical Therapy: Please provider 1-2 sessions of physical therapy as needed  Potassium Chloride (Eqv-Klor-Con 10) 10 mEq oral tablet, extended release: 1 tab(s) orally once a day

## 2025-07-03 NOTE — DISCHARGE NOTE PROVIDER - NSDCCPTREATMENT_GEN_ALL_CORE_FT
PRINCIPAL PROCEDURE  Procedure: CT head  Findings and Treatment: There is periventricular and subcortical white matter hypodensity without   mass effect, nonspecific, likely representing mild chronic microvascular   ischemic changes. There is no compelling evidence for an acute   transcortical infarction. There is no evidence of mass, mass effect,   midline shift or extra-axial fluid collection. The lateral ventricles and   cortical sulci are age-appropriate in size and configuration. The patient   is status post bilateral ocular lens replacement surgery. The orbits,   mastoid air cells and visualized paranasal sinuses are otherwise   unremarkable. The calvarium is intact. Consider MRI as clinically   warranted.  IMPRESSION:  Mild chronic microvascular changes without evidence of an   acute transcortical infarction or hemorrhage.        SECONDARY PROCEDURE  Procedure: Catheterization, right heart  Findings and Treatment: RA 4 mmHg   RV 32/4 mmHg   PA 31/12/20mmHg   PCWP 12 mmHg   CO/CI 4.8 L/min / 2.0 L/min/m2

## 2025-07-03 NOTE — DISCHARGE NOTE PROVIDER - CARE PROVIDER_API CALL
Libman, Richard Benjamin  Neurology  [address], [address]  [place] [address]  Phone: [phone]  Fax: [phone]  Established Patient  Follow Up Time: 1 week    [facility]  Interventional Cardiology  [address] [place] [address], [address]  [place] [address]  Phone: [phone]  Fax: [phone]  Established Patient  Follow Up Time: 1 week

## 2025-07-03 NOTE — DISCHARGE NOTE PROVIDER - HOSPITAL COURSE
85-year-old male with a history of CAD (s/p PCI with stent placement), chronic HFrEF, atrial fibrillation (on Eliquis), AICD, hypertension, hyperlipidemia, and COPD presented for elective right heart catheterization and CardioMEMS implantation c/b right sided hemiplegia c/f stroke s/p TPA.     Patient reports progressive exertional dyspnea over the past 15 years. He denies chest pain, palpitations, presyncope, syncope, headache, visual disturbances, CVA, PE, DVT, ALEXANDRO, abdominal pain, nausea, vomiting, diarrhea, constipation, hematochezia, melena, dysuria, hematuria, fever, or chills. He was evaluated by cardiology and, given persistent symptoms and abnormal non-invasive findings, was referred for invasive hemodynamic evaluation and device implantation.    Following the right heart catheterization, the patient developed acute right-sided hemiplegia, prompting a rapid response and subsequent stroke code activation. His last known normal was around 8:00 AM, and he was administered tPA at 10:30 AM. He reported that aside from difficulty moving, he did not feel any subjective changes. He mentioned a possible prior stroke diagnosis during a previous stent procedure, although details were unclear. The cardiac catheterization was performed for evaluation of cardiac function in the setting of chronic heart failure.    Patient was monitored in the ICU for q1 neuro checks. He received repeat imaging 24 hours after which was clear. Neurology and Cardiology teams came and cleared him after.    Patient was discharged home with no acute concerns     New Medications   Atorvastatin 80mg daily

## 2025-07-03 NOTE — DISCHARGE NOTE NURSING/CASE MANAGEMENT/SOCIAL WORK - PATIENT PORTAL LINK FT
You can access the FollowMyHealth Patient Portal offered by [facility] by registering at the following website: [email or web address]. By joining [facility]’s FollowMyHealth portal, you will also be able to view your health information using other applications (apps) compatible with our system.

## 2025-07-03 NOTE — DISCHARGE NOTE PROVIDER - NSDCFUSCHEDAPPT_GEN_ALL_CORE_FT
[facility]  NEUROLOGY [address]  Scheduled Appointment: 07/14/2025

## 2025-07-03 NOTE — PROGRESS NOTE ADULT - TIME BILLING
Face to face time with patient performing examination, discussing history, and reviewing care plan.  Review of chart including personal review of labs/imaging/consultant notes.  Coordination of care including collaboration and discussion with inpatient care team.
Reviewing the EMR, vitals, imaging, medication list, recent labs, prior records and coordinating care with medical providers. This time excludes procedures and teaching.
Reviewing the EMR, vitals, imaging, medication list, recent labs, prior records and coordinating care with medical providers. This time excludes procedures and teaching.
I have spent 55 minutes of time on the encounter which excludes teaching and separately reported services. I was present with the Resident during the key portions of the history and exam. I discussed the case with the Resident and agree with the findings and plan as documented in the Resident's note, unless noted below.   ROS otherwise negative

## 2025-07-03 NOTE — DISCHARGE NOTE PROVIDER - NSDCCPCAREPLAN_GEN_ALL_CORE_FT
PRINCIPAL DISCHARGE DIAGNOSIS  Diagnosis: Cerebrovascular accident (stroke)  Assessment and Plan of Treatment: You were admitted for an elective right-heart catheterization and CardioMEMS implant to evaluate heart-failure–related shortness of breath. Shortly after the procedure you developed sudden right-sided weakness, which triggered a stroke code. Your last known normal was 8 AM, so you received the [facility]-busting medicine tPA at 10:30 AM and were placed on stroke protocol with frequent neurologic checks, controlled blood pressure, and fall-and-aspiration precautions. Over the next 24 hours your strength improved markedly, and repeat brain imaging showed no bleeding or new stroke. An echocardiogram with bubble study revealed no clot or structural defect, and your longstanding atrial fibrillation remains covered by Eliquis. You were restarted on your usual heart-failure medications, including high-dose atorvastatin.  Call 911 immediately if you experience new weakness or numbness (especially on one side), difficulty speaking or understanding, sudden vision changes, dizziness, loss of balance, or a severe unexplained headache—these can signal another stroke. After discharge, see your primary-care doctor within one week, follow up with cardiology for heart-failure management and device checks, and keep a neurology appointment to track recovery and arrange any rehabilitation. Continue taking Eliquis, atorvastatin, and all other medications exactly as prescribed; follow a low-sodium diet; monitor your weight daily; stay as active as tolerated; avoid tobacco; limit alcohol; and attend all scheduled medical visits to lower the risk of future events.     PRINCIPAL DISCHARGE DIAGNOSIS  Diagnosis: Cerebrovascular accident (stroke)  Assessment and Plan of Treatment: You were admitted for an elective right-heart catheterization and CardioMEMS implant to evaluate heart-failure–related shortness of breath. Shortly after the procedure you developed sudden right-sided weakness, which triggered a stroke code. Your last known normal was 8 AM, so you received the clot-busting medicine tPA at 10:30 AM and were placed on stroke protocol with frequent neurologic checks, controlled blood pressure, and fall-and-aspiration precautions. Over the next 24 hours your strength improved markedly, and repeat brain imaging showed no bleeding or new stroke. An echocardiogram with bubble study revealed no clot or structural defect, and your longstanding atrial fibrillation remains covered by Eliquis. You were restarted on your usual heart-failure medications, including high-dose atorvastatin.  Call 911 immediately if you experience new weakness or numbness (especially on one side), difficulty speaking or understanding, sudden vision changes, dizziness, loss of balance, or a severe unexplained headache—these can signal another stroke. After discharge, see your primary-care doctor within one week, follow up with cardiology for heart-failure management and device checks, and keep a neurology appointment to track recovery and arrange any rehabilitation. Continue taking Eliquis, atorvastatin, and all other medications exactly as prescribed; follow a low-sodium diet; monitor your weight daily; stay as active as tolerated; avoid tobacco; limit alcohol; and attend all scheduled medical visits to lower the risk of future events.  *Please hold valsartan and spironolactone until you follow up with your cardiologist  *Please follow up with neurology for MRI

## 2025-07-03 NOTE — DISCHARGE NOTE PROVIDER - CARE PROVIDERS DIRECT ADDRESSES
[email or web address]

## 2025-07-03 NOTE — DISCHARGE NOTE NURSING/CASE MANAGEMENT/SOCIAL WORK - FINANCIAL ASSISTANCE
[facility] provides services at a reduced cost to those who are determined to be eligible through [facility]’s financial assistance program. Information regarding [facility]’s financial assistance program can be found by going to [email or web address] or by calling [phone].

## 2025-07-03 NOTE — PROGRESS NOTE ADULT - ASSESSMENT
85-year-old male with a history of CAD (s/p PCI with stent placement), chronic HFrEF, atrial fibrillation (on Eliquis), AICD, hypertension, hyperlipidemia, and COPD presented for elective right heart catheterization and CardioMEMS implantation c/b right sided hemiplegia c/f stroke s/p TPA.    =====Neurologic=====  Patient is AOx 3    # TIA/Stroke-like Event  Patient experienced a stroke-like event on July 1st at 10:30 AM, presenting with right-sided hemiplegia (L > R) and reduced sensation in the same distribution. Received tPA. Initial CT Head (stroke protocol) showed no acute findings. Differential includes acute embolic event, cardioembolic stroke (e.g., atrial fibrillation–associated emboli), or post-catheterization embolic event.  7/2: Overall improved, no acute neurological findings. Bubble study inconclusive     Plan:  - General stroke protocol: Neuro checks per routine , BP goal =180/105, keep normothermic, euglycemic, fall precautions, aspiration precautions  - Repeat CT Head (non-contrast) on 7/2 at 10:30am   - pending MR head     =====Cardiovascular=====  #CAD  Prior history of stent in 2013. Takes atorva.  7/2: lipid panel normal, A1c 4%   - Can restart antiplatelet 24 hours after tenecteplase : pending CT read   - does he need 80mg atorva? is 40 okay?    # Mobitz-HB (Wenckebach)  EKG after the neurological event was noted for new Mobitz I (Wenckebach) heart block. Per prior EKGs, the patient has had a Type I AV block in addition to atrial fibrillation. Discussed with cardiology — no acute concerns at this time.  Cardiologist reports low concern at the moment. Patient back to baseline Type 1 AV block.   -Plan for event recorder on discharge  -Continue to monitor patient’s telemetry tracing    #AFIB  Home medication: Apixaban 5mg BID, Metoprolol   - Will FU w/neuro about when to start Eliquis  - IF pressures stable can consider starting metoprolol -> will reassess 7/3 (To continue permissive htn)     #CHF   Admission was for right heart catheterization and CardioMEMS implantation due to worsening shortness of breath in the setting of heart failure. RHC was fairly unremarkable, showing appropriate filling pressures and preserved cardiac output.    RHC findings:  RA: 4 mmHg  RV: 32/4 mmHg  PA: 31/12 (mean 20) mmHg  PCWP: 12 mmHg  CO/CI: 4.8 L/min / 2.0 L/min/m²    TTE (May 2025): Severely reduced LVEF, ascending aortic dilation, mild aortic regurgitation  - Restarted Lasix 40mg   - will continue to hold spirono + metoprolol until appropriate   - Monitor daily weights, continue general heart failure care    =====Pulmonary=====  #COPD   Patient takes inhalers at home, has significant COPD. No signs at the moment for acute COPD exacerbation  - Advair BID + Duonebs   - started tessalon pearls     =====GI=====  No acute GI concerns at this time   Tolerating regular diet, dietician approved     =====Renal/=====  No acute renal concerns   - no contrast used during RHC     =====Infectious Disease=====  #Leukocytosis Resolved  -Likely reactive NTD    =====Endocrine=====  No concerns presently.   A1c: 4%    =====Heme/Onc=====  - Tenecteplase 25 mg  DVT PPX:  If CT head repeat okay then can restart subq, will ask about full AC

## 2025-07-14 ENCOUNTER — NON-APPOINTMENT (OUTPATIENT)
Age: 85
End: 2025-07-14

## 2025-07-14 ENCOUNTER — APPOINTMENT (OUTPATIENT)
Dept: NEUROLOGY | Facility: CLINIC | Age: 85
End: 2025-07-14
Payer: MEDICARE

## 2025-07-14 VITALS
BODY MASS INDEX: 31.78 KG/M2 | HEIGHT: 70 IN | WEIGHT: 222 LBS | DIASTOLIC BLOOD PRESSURE: 57 MMHG | HEART RATE: 93 BPM | SYSTOLIC BLOOD PRESSURE: 94 MMHG

## 2025-07-14 PROBLEM — I63.9 CEREBROVASCULAR ACCIDENT (CVA), UNSPECIFIED MECHANISM: Status: ACTIVE | Noted: 2025-07-14

## 2025-07-14 PROCEDURE — G2211 COMPLEX E/M VISIT ADD ON: CPT

## 2025-07-14 PROCEDURE — 99204 OFFICE O/P NEW MOD 45 MIN: CPT

## 2025-07-14 RX ORDER — APIXABAN 5 MG/1
5 TABLET, FILM COATED ORAL
Refills: 0 | Status: ACTIVE | COMMUNITY

## 2025-07-17 ENCOUNTER — APPOINTMENT (OUTPATIENT)
Dept: PULMONOLOGY | Facility: CLINIC | Age: 85
End: 2025-07-17
Payer: MEDICARE

## 2025-07-17 ENCOUNTER — APPOINTMENT (OUTPATIENT)
Dept: INTERNAL MEDICINE | Facility: CLINIC | Age: 85
End: 2025-07-17
Payer: MEDICARE

## 2025-07-17 VITALS
DIASTOLIC BLOOD PRESSURE: 68 MMHG | HEART RATE: 82 BPM | RESPIRATION RATE: 14 BRPM | TEMPERATURE: 97.9 F | OXYGEN SATURATION: 92 % | BODY MASS INDEX: 33.77 KG/M2 | WEIGHT: 228 LBS | SYSTOLIC BLOOD PRESSURE: 102 MMHG | HEIGHT: 69 IN

## 2025-07-17 PROBLEM — J84.9 INTERSTITIAL LUNG DISEASE: Status: ACTIVE | Noted: 2025-07-17

## 2025-07-17 PROBLEM — J47.9 BRONCHIECTASIS WITHOUT COMPLICATION: Status: ACTIVE | Noted: 2025-07-17

## 2025-07-17 PROCEDURE — 94729 DIFFUSING CAPACITY: CPT

## 2025-07-17 PROCEDURE — 94060 EVALUATION OF WHEEZING: CPT

## 2025-07-17 PROCEDURE — ZZZZZ: CPT

## 2025-07-17 PROCEDURE — 99215 OFFICE O/P EST HI 40 MIN: CPT | Mod: 25

## 2025-07-17 PROCEDURE — 94727 GAS DIL/WSHOT DETER LNG VOL: CPT

## 2025-07-17 RX ORDER — PANTOPRAZOLE 40 MG/1
40 TABLET, DELAYED RELEASE ORAL
Refills: 0 | Status: ACTIVE | COMMUNITY

## 2025-07-17 RX ORDER — PANTOPRAZOLE 40 MG/1
40 TABLET, DELAYED RELEASE ORAL
Qty: 30 | Refills: 4 | Status: ACTIVE | COMMUNITY
Start: 2025-07-17 | End: 1900-01-01

## 2025-07-17 RX ORDER — PREDNISONE 10 MG/1
10 TABLET ORAL
Qty: 60 | Refills: 1 | Status: ACTIVE | COMMUNITY
Start: 2025-07-17 | End: 1900-01-01

## 2025-07-17 RX ORDER — BUDESONIDE 1 MG/2ML
1 INHALANT ORAL DAILY
Qty: 60 | Refills: 5 | Status: ACTIVE | COMMUNITY
Start: 2025-07-17 | End: 1900-01-01

## 2025-07-17 RX ORDER — MAGNESIUM OXIDE 400 MG
400 TABLET ORAL
Refills: 0 | Status: ACTIVE | COMMUNITY

## 2025-07-17 RX ORDER — SODIUM CHLORIDE FOR INHALATION 7 %
7 VIAL, NEBULIZER (ML) INHALATION
Qty: 1 | Refills: 5 | Status: ACTIVE | COMMUNITY
Start: 2025-07-17 | End: 1900-01-01

## 2025-07-17 RX ORDER — ALBUTEROL SULFATE 2.5 MG/3ML
(2.5 MG/3ML) SOLUTION RESPIRATORY (INHALATION)
Qty: 90 | Refills: 2 | Status: ACTIVE | COMMUNITY
Start: 2025-07-17 | End: 1900-01-01

## 2025-08-19 ENCOUNTER — APPOINTMENT (OUTPATIENT)
Dept: INTERNAL MEDICINE | Facility: CLINIC | Age: 85
End: 2025-08-19
Payer: MEDICARE

## 2025-08-19 ENCOUNTER — APPOINTMENT (OUTPATIENT)
Dept: PULMONOLOGY | Facility: CLINIC | Age: 85
End: 2025-08-19
Payer: MEDICARE

## 2025-08-19 VITALS
OXYGEN SATURATION: 96 % | HEIGHT: 68 IN | WEIGHT: 222 LBS | DIASTOLIC BLOOD PRESSURE: 72 MMHG | HEART RATE: 80 BPM | SYSTOLIC BLOOD PRESSURE: 109 MMHG | TEMPERATURE: 98.4 F | BODY MASS INDEX: 33.65 KG/M2 | RESPIRATION RATE: 20 BRPM

## 2025-08-19 PROCEDURE — ZZZZZ: CPT

## 2025-08-19 PROCEDURE — 99214 OFFICE O/P EST MOD 30 MIN: CPT | Mod: 25

## 2025-08-19 PROCEDURE — 94060 EVALUATION OF WHEEZING: CPT

## 2025-08-19 PROCEDURE — 94618 PULMONARY STRESS TESTING: CPT

## 2025-08-19 RX ORDER — FUROSEMIDE 80 MG/1
TABLET ORAL
Refills: 0 | Status: ACTIVE | COMMUNITY

## 2025-08-19 RX ORDER — METOPROLOL SUCCINATE 25 MG/1
25 TABLET, EXTENDED RELEASE ORAL DAILY
Refills: 0 | Status: ACTIVE | COMMUNITY

## 2025-08-19 RX ORDER — ATORVASTATIN CALCIUM 80 MG/1
80 TABLET, FILM COATED ORAL DAILY
Refills: 0 | Status: ACTIVE | COMMUNITY

## 2025-08-19 RX ORDER — POTASSIUM CHLORIDE 10 MEQ
10 CAPSULE, EXTENDED RELEASE ORAL DAILY
Refills: 0 | Status: ACTIVE | COMMUNITY